# Patient Record
Sex: FEMALE | Race: WHITE | Employment: OTHER | URBAN - METROPOLITAN AREA
[De-identification: names, ages, dates, MRNs, and addresses within clinical notes are randomized per-mention and may not be internally consistent; named-entity substitution may affect disease eponyms.]

---

## 2017-01-03 ENCOUNTER — TELEPHONE (OUTPATIENT)
Dept: INTERNAL MEDICINE CLINIC | Facility: CLINIC | Age: 77
End: 2017-01-03

## 2017-01-03 ENCOUNTER — OFFICE VISIT (OUTPATIENT)
Dept: INTERNAL MEDICINE CLINIC | Facility: CLINIC | Age: 77
End: 2017-01-03

## 2017-01-03 VITALS
DIASTOLIC BLOOD PRESSURE: 74 MMHG | WEIGHT: 143 LBS | RESPIRATION RATE: 16 BRPM | SYSTOLIC BLOOD PRESSURE: 120 MMHG | BODY MASS INDEX: 23 KG/M2 | OXYGEN SATURATION: 93 % | HEART RATE: 84 BPM | TEMPERATURE: 100 F

## 2017-01-03 DIAGNOSIS — R19.7 DIARRHEA, UNSPECIFIED TYPE: Primary | ICD-10-CM

## 2017-01-03 PROCEDURE — 99213 OFFICE O/P EST LOW 20 MIN: CPT | Performed by: INTERNAL MEDICINE

## 2017-01-03 RX ORDER — LOPERAMIDE HYDROCHLORIDE 2 MG/1
2 CAPSULE ORAL 4 TIMES DAILY PRN
Qty: 14 CAPSULE | Refills: 1 | Status: SHIPPED | OUTPATIENT
Start: 2017-01-03 | End: 2017-05-02 | Stop reason: ALTCHOICE

## 2017-01-03 NOTE — PROGRESS NOTES
I did leave a message with her daughter-in-law Michael Salgado gave her an update on the visit. Also informed Michael Salgado that we sent a prescription for Imodium to [facility]. There are staff will make sure that she is signed up to use [facility].   If

## 2017-01-03 NOTE — PROGRESS NOTES
Patient states she has been feeling fine except this morning has had loose bowels. 2.  No blood. Soft mushy stool. No fever chills. Denies a sore throat headaches. She does have memory issues. No polyuria or dysuria. No new medications.   No recent t

## 2017-01-10 ENCOUNTER — OFFICE VISIT (OUTPATIENT)
Dept: INTERNAL MEDICINE CLINIC | Facility: CLINIC | Age: 77
End: 2017-01-10

## 2017-01-10 VITALS
TEMPERATURE: 99 F | SYSTOLIC BLOOD PRESSURE: 138 MMHG | DIASTOLIC BLOOD PRESSURE: 70 MMHG | RESPIRATION RATE: 16 BRPM | OXYGEN SATURATION: 96 % | HEART RATE: 82 BPM

## 2017-01-10 DIAGNOSIS — J06.9 URTI (ACUTE UPPER RESPIRATORY INFECTION): ICD-10-CM

## 2017-01-10 DIAGNOSIS — R41.3 MEMORY DEFICIT: Primary | ICD-10-CM

## 2017-01-10 PROCEDURE — 99213 OFFICE O/P EST LOW 20 MIN: CPT | Performed by: INTERNAL MEDICINE

## 2017-01-10 RX ORDER — ACETAMINOPHEN 500 MG
500 TABLET ORAL EVERY 6 HOURS PRN
COMMUNITY
End: 2020-09-30

## 2017-01-10 NOTE — PROGRESS NOTES
Patient is here with her daughter. She does have some memory deficit. History obtained from patient and daughter. Patient has not felt well since [place]. Early on she did have loose stools.   No stools are back to normal.  She does complain of a head

## 2017-01-13 ENCOUNTER — TELEPHONE (OUTPATIENT)
Dept: INTERNAL MEDICINE CLINIC | Facility: CLINIC | Age: 77
End: 2017-01-13

## 2017-01-13 ENCOUNTER — OFFICE VISIT (OUTPATIENT)
Dept: INTERNAL MEDICINE CLINIC | Facility: CLINIC | Age: 77
End: 2017-01-13

## 2017-01-13 VITALS
TEMPERATURE: 98 F | WEIGHT: 145.5 LBS | OXYGEN SATURATION: 98 % | SYSTOLIC BLOOD PRESSURE: 140 MMHG | DIASTOLIC BLOOD PRESSURE: 84 MMHG | HEART RATE: 72 BPM | RESPIRATION RATE: 16 BRPM | BODY MASS INDEX: 23 KG/M2

## 2017-01-13 DIAGNOSIS — J01.10 ACUTE NON-RECURRENT FRONTAL SINUSITIS: Primary | ICD-10-CM

## 2017-01-13 PROCEDURE — 99213 OFFICE O/P EST LOW 20 MIN: CPT | Performed by: INTERNAL MEDICINE

## 2017-01-13 RX ORDER — AMOXICILLIN 500 MG/1
500 CAPSULE ORAL 3 TIMES DAILY
Qty: 21 CAPSULE | Refills: 0 | Status: SHIPPED | OUTPATIENT
Start: 2017-01-13 | End: 2017-01-13

## 2017-01-13 NOTE — PROGRESS NOTES
Patient comes in complaining of headache frontal lobe and pain in her ears. She is very tired and fatigued with head congestion. Physical examination pleasant individual appears in no acute distress.   Throat clear both tympanic membranes cloudy with a

## 2017-01-13 NOTE — TELEPHONE ENCOUNTER
I left a message with her daughter-in-law RYAN that she has a sinus infection and will put her on antibiotic.

## 2017-01-13 NOTE — PATIENT INSTRUCTIONS
TOP FALL PREVENTION TIPS    INSIDE YOUR HOME   KITCHEN:  ? Use non skid mats only. ? Clean up spills as soon as they happen. ? Keep objects that you use often within easy reach.   BATHROOM:  ? Install grab bars on the bathroom walls beside the tub, sh

## 2017-01-15 RX ORDER — AMOXICILLIN 500 MG/1
500 CAPSULE ORAL 3 TIMES DAILY
Qty: 21 CAPSULE | Refills: 0 | Status: SHIPPED | OUTPATIENT
Start: 2017-01-15 | End: 2017-01-25

## 2017-01-19 ENCOUNTER — OFFICE VISIT (OUTPATIENT)
Dept: INTERNAL MEDICINE CLINIC | Facility: CLINIC | Age: 77
End: 2017-01-19

## 2017-01-19 VITALS
DIASTOLIC BLOOD PRESSURE: 76 MMHG | BODY MASS INDEX: 23 KG/M2 | OXYGEN SATURATION: 95 % | WEIGHT: 144.69 LBS | HEART RATE: 76 BPM | SYSTOLIC BLOOD PRESSURE: 134 MMHG | RESPIRATION RATE: 18 BRPM | TEMPERATURE: 98 F

## 2017-01-19 DIAGNOSIS — R06.00 PND (PAROXYSMAL NOCTURNAL DYSPNEA): Primary | ICD-10-CM

## 2017-01-19 DIAGNOSIS — R41.3 MEMORY DEFICIT: ICD-10-CM

## 2017-01-19 PROCEDURE — 99213 OFFICE O/P EST LOW 20 MIN: CPT | Performed by: INTERNAL MEDICINE

## 2017-01-19 RX ORDER — SACCHAROMYCES BOULARDII 250 MG
250 CAPSULE ORAL DAILY
COMMUNITY
End: 2020-09-30

## 2017-01-19 NOTE — PROGRESS NOTES
Patient is complaining of postnasal drip. We recently saw her for sinus infection. She states she no longer has headache may be \"a mild. No sore throat earache. No cough shortness of breath or wheezing. Physical examination throat ears clear.   Lung

## 2017-01-27 DIAGNOSIS — R73.01 IFG (IMPAIRED FASTING GLUCOSE): Primary | ICD-10-CM

## 2017-01-27 RX ORDER — LISINOPRIL 5 MG/1
5 TABLET ORAL DAILY
Qty: 90 TABLET | Refills: 3 | Status: SHIPPED | OUTPATIENT
Start: 2017-01-27 | End: 2018-03-28

## 2017-04-11 RX ORDER — MEMANTINE HYDROCHLORIDE 10 MG/1
10 TABLET ORAL 2 TIMES DAILY
COMMUNITY
End: 2020-09-30

## 2017-04-22 RX ORDER — LEVOTHYROXINE SODIUM 0.07 MG/1
75 TABLET ORAL
Qty: 90 TABLET | Refills: 0 | Status: SHIPPED | OUTPATIENT
Start: 2017-04-22 | End: 2018-02-19

## 2017-04-22 RX ORDER — SIMVASTATIN 20 MG
20 TABLET ORAL NIGHTLY
Qty: 90 TABLET | Refills: 0 | Status: SHIPPED | OUTPATIENT
Start: 2017-04-22 | End: 2020-09-30

## 2017-05-02 ENCOUNTER — TELEPHONE (OUTPATIENT)
Dept: INTERNAL MEDICINE CLINIC | Facility: CLINIC | Age: 77
End: 2017-05-02

## 2017-05-02 ENCOUNTER — OFFICE VISIT (OUTPATIENT)
Dept: INTERNAL MEDICINE CLINIC | Facility: CLINIC | Age: 77
End: 2017-05-02

## 2017-05-02 VITALS
OXYGEN SATURATION: 97 % | RESPIRATION RATE: 16 BRPM | SYSTOLIC BLOOD PRESSURE: 135 MMHG | HEART RATE: 85 BPM | BODY MASS INDEX: 24 KG/M2 | WEIGHT: 150 LBS | TEMPERATURE: 99 F | DIASTOLIC BLOOD PRESSURE: 82 MMHG

## 2017-05-02 DIAGNOSIS — R41.3 MEMORY DEFICIT: ICD-10-CM

## 2017-05-02 DIAGNOSIS — J01.10 ACUTE NON-RECURRENT FRONTAL SINUSITIS: Primary | ICD-10-CM

## 2017-05-02 PROCEDURE — 99213 OFFICE O/P EST LOW 20 MIN: CPT | Performed by: INTERNAL MEDICINE

## 2017-05-02 RX ORDER — AMOXICILLIN 500 MG/1
500 CAPSULE ORAL 2 TIMES DAILY
Qty: 14 CAPSULE | Refills: 0 | Status: SHIPPED | OUTPATIENT
Start: 2017-05-02 | End: 2017-06-27 | Stop reason: ALTCHOICE

## 2017-05-02 NOTE — PATIENT INSTRUCTIONS
Amber you have a sinus infection. I ordered an antibiotic at [facility]. Her daughter-in-law Gretchen Lora will pick it up. You may take Tylenol PM in the evening. During the daytime I recommend taking plain Tylenol. The Tylenol PM may make you drowsy.   I wou

## 2017-05-02 NOTE — TELEPHONE ENCOUNTER
I called Reese Grover well Endy Liang was in examination room and update her.see today's note for further information

## 2017-05-02 NOTE — PROGRESS NOTES
For last 2 days patient has been complaining of pain in her ears and forehead. She has felt feverish. Been taken Tylenol with improvement in symptoms. No skin rashes. Appetite is been good bowel movements unchanged.     Physical examination pleasant ind

## 2017-05-09 ENCOUNTER — OFFICE VISIT (OUTPATIENT)
Dept: INTERNAL MEDICINE CLINIC | Facility: CLINIC | Age: 77
End: 2017-05-09

## 2017-05-09 VITALS
RESPIRATION RATE: 16 BRPM | DIASTOLIC BLOOD PRESSURE: 76 MMHG | BODY MASS INDEX: 24.03 KG/M2 | OXYGEN SATURATION: 94 % | WEIGHT: 149.5 LBS | HEIGHT: 66.3 IN | HEART RATE: 79 BPM | SYSTOLIC BLOOD PRESSURE: 130 MMHG | TEMPERATURE: 99 F

## 2017-05-09 DIAGNOSIS — E78.5 DYSLIPIDEMIA: ICD-10-CM

## 2017-05-09 DIAGNOSIS — Z91.81 RISK FOR FALLS: ICD-10-CM

## 2017-05-09 DIAGNOSIS — N18.30 CRF (CHRONIC RENAL FAILURE), STAGE 3 (MODERATE) (HCC): ICD-10-CM

## 2017-05-09 DIAGNOSIS — E03.9 ACQUIRED HYPOTHYROIDISM: ICD-10-CM

## 2017-05-09 DIAGNOSIS — Z00.00 ENCOUNTER FOR ANNUAL HEALTH EXAMINATION: ICD-10-CM

## 2017-05-09 DIAGNOSIS — I10 ESSENTIAL HYPERTENSION WITH GOAL BLOOD PRESSURE LESS THAN 140/90: ICD-10-CM

## 2017-05-09 DIAGNOSIS — H35.3132 INTERMEDIATE STAGE NONEXUDATIVE AGE-RELATED MACULAR DEGENERATION OF BOTH EYES: ICD-10-CM

## 2017-05-09 DIAGNOSIS — R41.3 MEMORY DEFICIT: Primary | ICD-10-CM

## 2017-05-09 DIAGNOSIS — R73.01 IFG (IMPAIRED FASTING GLUCOSE): ICD-10-CM

## 2017-05-09 DIAGNOSIS — H25.813 COMBINED FORMS OF AGE-RELATED CATARACT OF BOTH EYES: ICD-10-CM

## 2017-05-09 PROBLEM — N18.9 CRF (CHRONIC RENAL FAILURE): Status: ACTIVE | Noted: 2017-05-09

## 2017-05-09 PROCEDURE — 36415 COLL VENOUS BLD VENIPUNCTURE: CPT | Performed by: INTERNAL MEDICINE

## 2017-05-09 PROCEDURE — 83036 HEMOGLOBIN GLYCOSYLATED A1C: CPT | Performed by: INTERNAL MEDICINE

## 2017-05-09 PROCEDURE — G0439 PPPS, SUBSEQ VISIT: HCPCS | Performed by: INTERNAL MEDICINE

## 2017-05-09 NOTE — PATIENT INSTRUCTIONS
TOP FALL PREVENTION TIPS    INSIDE YOUR HOME   KITCHEN:  ? Use non skid mats only. ? Clean up spills as soon as they happen. ? Keep objects that you use often within easy reach.   BATHROOM:  ? Install grab bars on the bathroom walls beside the tub, sh At Least  Annually for Diabetics HGBA1C (%)   Date Value   11/08/2016 5.8*    No flowsheet data found.     Fasting Blood Sugar (FSB)   Patient must be diagnosed with one of the following:   • Hypertension   • Dyslipidemia   • Obesity (BMI ³30 kg/m2)   • P but covered  Covered but uncomfortable   Glaucoma Screening      Ophthalmology Visit   Covered annually for Diabetics, people with Glaucoma family history,   Americans over age 48   Americans over age 72 No flowsheet data found.  [place] marycruz darby mentally retarded   Persons who live in the same house as a HepB virus carrier   Homosexual men   Illicit injectable drug abusers     Tetanus Toxoid- Only covered with a cut with metal- TD and TDaP Not covered by Medicare Part B) No orders found for this o

## 2017-05-09 NOTE — PROGRESS NOTES
Patient is here with her daughter. She does have memory deficit. She is on Namenda and Aricept. Seems to be functioning well. Problem #2 impaired fasting glucose. Does not check home blood sugar. Up-to-date on eye exam.  Will check A1c.   Problem #3 h for the 5/9/17 encounter (Office Visit) with Heena Salter MD:  amoxicillin 500 MG Oral Cap Take 1 capsule (500 mg total) by mouth 2 (two) times daily.    Levothyroxine Sodium 75 MCG Oral Tab Take 1 tablet (75 mcg total) by mouth before breakfast. complaint of urinary incontinence   MUSCULOSKELETAL: denies back pain  NEURO: denies headaches  PSYCHE: denies depression or anxiety  HEMATOLOGIC: denies hx of anemia  ENDOCRINE: Hx of thyroid history  ALL/ASTHMA:  hx of allergy     EXAM:   /76 mmHg AND OLDER PRSV FREE SINGLE D (52051) FLU CLINIC 10/06/2016       ASSESSMENT AND OTHER RELEVANT CHRONIC CONDITIONS:   Gonzalez Choi is a 78year old female who presents for a Medicare Assessment.      PLAN SUMMARY:   Diagnoses and all orders for this visi without help    Dressing: Able without help    Eating: Able without help    Driving:  (no driving)    Preparing your meals: Able without help    Managing money/bills: Cannot do without help    Taking medications as prescribed: Need some help    Are you abl Screening      HbgA1C   Annually   Lab Results  Component Value Date   A1C 5.8* 11/08/2016    No flowsheet data found.     Fasting Blood Sugar (FSB)Annually   GLUCOSE (mg/dL)   Date Value   04/29/2016 104*   ----------       Cardiovascular Disease Screening factors:   End-stage renal disease   Hemophiliacs who received Factor VIII or IX concentrates   Clients of institutions for the mentally retarded   Persons who live in the same house as a HepB virus carrier   Homosexual men   Illicit injectable drug abuser

## 2017-06-21 ENCOUNTER — HOSPITAL ENCOUNTER (OUTPATIENT)
Dept: CT IMAGING | Facility: HOSPITAL | Age: 77
Discharge: HOME | End: 2017-06-21
Attending: INTERNAL MEDICINE

## 2017-06-21 DIAGNOSIS — Z13.9 SCREENING PROCEDURE: ICD-10-CM

## 2017-06-27 ENCOUNTER — OFFICE VISIT (OUTPATIENT)
Dept: INTERNAL MEDICINE CLINIC | Facility: CLINIC | Age: 77
End: 2017-06-27

## 2017-06-27 VITALS
BODY MASS INDEX: 25 KG/M2 | WEIGHT: 154 LBS | DIASTOLIC BLOOD PRESSURE: 75 MMHG | RESPIRATION RATE: 16 BRPM | HEART RATE: 80 BPM | OXYGEN SATURATION: 95 % | SYSTOLIC BLOOD PRESSURE: 135 MMHG | TEMPERATURE: 99 F

## 2017-06-27 DIAGNOSIS — J01.10 ACUTE NON-RECURRENT FRONTAL SINUSITIS: Primary | ICD-10-CM

## 2017-06-27 PROCEDURE — 99213 OFFICE O/P EST LOW 20 MIN: CPT | Performed by: INTERNAL MEDICINE

## 2017-06-27 RX ORDER — MECLIZINE HCL 12.5 MG/1
12.5 TABLET ORAL 3 TIMES DAILY PRN
Status: DISCONTINUED | OUTPATIENT
Start: 2017-06-27 | End: 2017-11-14 | Stop reason: ALTCHOICE

## 2017-06-27 RX ORDER — AMOXICILLIN 500 MG/1
500 CAPSULE ORAL 3 TIMES DAILY
Qty: 30 CAPSULE | Refills: 0 | Status: SHIPPED | OUTPATIENT
Start: 2017-06-27 | End: 2017-07-07

## 2017-06-27 NOTE — PROGRESS NOTES
Patient's states for about a week she has had a frontal headache felt dizzy. At times  room was spinning around. Denies a sore throat earache no fever chills night sweats or skin rashes. She states in the past she has had a history of sinusitis.   She ha

## 2017-07-06 ENCOUNTER — OFFICE VISIT (OUTPATIENT)
Dept: INTERNAL MEDICINE CLINIC | Facility: CLINIC | Age: 77
End: 2017-07-06

## 2017-07-06 VITALS
RESPIRATION RATE: 16 BRPM | HEART RATE: 84 BPM | SYSTOLIC BLOOD PRESSURE: 138 MMHG | OXYGEN SATURATION: 96 % | BODY MASS INDEX: 25 KG/M2 | TEMPERATURE: 99 F | DIASTOLIC BLOOD PRESSURE: 85 MMHG | WEIGHT: 154.88 LBS

## 2017-07-06 DIAGNOSIS — R42 VERTIGO: Primary | ICD-10-CM

## 2017-07-06 PROCEDURE — 99213 OFFICE O/P EST LOW 20 MIN: CPT | Performed by: INTERNAL MEDICINE

## 2017-07-06 RX ORDER — MECLIZINE HCL 12.5 MG/1
TABLET ORAL
Qty: 21 TABLET | Refills: 0 | Status: SHIPPED | OUTPATIENT
Start: 2017-07-06 | End: 2017-09-07

## 2017-07-06 NOTE — PROGRESS NOTES
She is here with her friend Edmar Powerscarl. Following up on her vertigo. States she feels better but still at times becomes dizzy. She has not completed her amoxicillin. She is done with Antivert. Complains a lot of head congestion.   No fever chills or shortne

## 2017-07-13 ENCOUNTER — OFFICE VISIT (OUTPATIENT)
Dept: INTERNAL MEDICINE CLINIC | Facility: CLINIC | Age: 77
End: 2017-07-13

## 2017-07-13 VITALS
OXYGEN SATURATION: 98 % | RESPIRATION RATE: 18 BRPM | BODY MASS INDEX: 25 KG/M2 | DIASTOLIC BLOOD PRESSURE: 88 MMHG | TEMPERATURE: 99 F | HEART RATE: 77 BPM | SYSTOLIC BLOOD PRESSURE: 132 MMHG | WEIGHT: 155.38 LBS

## 2017-07-13 DIAGNOSIS — R42 VERTIGO: Primary | ICD-10-CM

## 2017-07-13 PROCEDURE — 99213 OFFICE O/P EST LOW 20 MIN: CPT | Performed by: INTERNAL MEDICINE

## 2017-07-13 NOTE — PROGRESS NOTES
Meng Thao is here with her friend Eduardo Ball. He states she is doing better. Did have a brief episode yesterday. Still has postnasal drip. No head pressure. No cough shortness of breath. No sore throat or earache. No fever chills.     Physical examination pl

## 2017-07-28 ENCOUNTER — TELEPHONE (OUTPATIENT)
Dept: INTERNAL MEDICINE CLINIC | Facility: CLINIC | Age: 77
End: 2017-07-28

## 2017-07-28 NOTE — TELEPHONE ENCOUNTER
Called Mrs. Hernandez regarding last colonoscopy procedure. She has not had one and was advised that she will get a referral and we will call her when that is ready for . Please advise whether you want colonoscopy or cologuard to be done.

## 2017-08-10 ENCOUNTER — OFFICE VISIT (OUTPATIENT)
Dept: INTERNAL MEDICINE CLINIC | Facility: CLINIC | Age: 77
End: 2017-08-10

## 2017-08-10 VITALS
HEART RATE: 90 BPM | WEIGHT: 155 LBS | OXYGEN SATURATION: 96 % | DIASTOLIC BLOOD PRESSURE: 85 MMHG | BODY MASS INDEX: 25 KG/M2 | TEMPERATURE: 97 F | SYSTOLIC BLOOD PRESSURE: 135 MMHG | RESPIRATION RATE: 18 BRPM

## 2017-08-10 DIAGNOSIS — E03.9 ACQUIRED HYPOTHYROIDISM: ICD-10-CM

## 2017-08-10 DIAGNOSIS — R73.01 IFG (IMPAIRED FASTING GLUCOSE): Primary | ICD-10-CM

## 2017-08-10 DIAGNOSIS — R41.3 MEMORY DEFICIT: ICD-10-CM

## 2017-08-10 DIAGNOSIS — E78.5 DYSLIPIDEMIA: ICD-10-CM

## 2017-08-10 PROCEDURE — 99214 OFFICE O/P EST MOD 30 MIN: CPT | Performed by: INTERNAL MEDICINE

## 2017-08-10 NOTE — PROGRESS NOTES
Priscilla Harrison is here with her daughter-in-law. She does have memory issues. Daughter manages her medications. Problem #1 impaired fasting glucose. Patient is up-to-date on her eye exam.  Denies any polyuria polydipsia.   No numbness or tingling in hands or fe

## 2017-08-11 ENCOUNTER — NURSE ONLY (OUTPATIENT)
Dept: INTERNAL MEDICINE CLINIC | Facility: CLINIC | Age: 77
End: 2017-08-11

## 2017-08-11 DIAGNOSIS — E03.9 ACQUIRED HYPOTHYROIDISM: ICD-10-CM

## 2017-08-11 DIAGNOSIS — R41.3 MEMORY DEFICIT: ICD-10-CM

## 2017-08-11 DIAGNOSIS — E78.5 DYSLIPIDEMIA: ICD-10-CM

## 2017-08-11 DIAGNOSIS — R73.01 IFG (IMPAIRED FASTING GLUCOSE): ICD-10-CM

## 2017-08-11 LAB
ALBUMIN SERPL-MCNC: 3.9 G/DL (ref 3.5–4.8)
ALP LIVER SERPL-CCNC: 74 U/L (ref 55–142)
ALT SERPL-CCNC: 18 U/L (ref 14–54)
AST SERPL-CCNC: 17 U/L (ref 15–41)
BASOPHILS # BLD AUTO: 0.04 X10(3) UL (ref 0–0.1)
BASOPHILS NFR BLD AUTO: 0.6 %
BILIRUB SERPL-MCNC: 0.4 MG/DL (ref 0.1–2)
BUN BLD-MCNC: 18 MG/DL (ref 8–20)
CALCIUM BLD-MCNC: 9.2 MG/DL (ref 8.3–10.3)
CHLORIDE: 110 MMOL/L (ref 101–111)
CHOLEST SMN-MCNC: 170 MG/DL (ref ?–200)
CO2: 25 MMOL/L (ref 22–32)
CREAT BLD-MCNC: 1.03 MG/DL (ref 0.55–1.02)
EOSINOPHIL # BLD AUTO: 0.43 X10(3) UL (ref 0–0.3)
EOSINOPHIL NFR BLD AUTO: 5.9 %
ERYTHROCYTE [DISTWIDTH] IN BLOOD BY AUTOMATED COUNT: 12.9 % (ref 11.5–16)
EST. AVERAGE GLUCOSE BLD GHB EST-MCNC: 123 MG/DL (ref 68–126)
GLUCOSE BLD-MCNC: 84 MG/DL (ref 70–99)
HBA1C MFR BLD HPLC: 5.9 % (ref ?–5.7)
HCT VFR BLD AUTO: 43.5 % (ref 34–50)
HDLC SERPL-MCNC: 69 MG/DL (ref 45–?)
HDLC SERPL: 2.46 {RATIO} (ref ?–4.44)
HGB BLD-MCNC: 14.2 G/DL (ref 12–16)
IMMATURE GRANULOCYTE COUNT: 0.02 X10(3) UL (ref 0–1)
IMMATURE GRANULOCYTE RATIO %: 0.3 %
LDLC SERPL CALC-MCNC: 79 MG/DL (ref ?–130)
LDLC SERPL-MCNC: 22 MG/DL (ref 5–40)
LYMPHOCYTES # BLD AUTO: 2.27 X10(3) UL (ref 0.9–4)
LYMPHOCYTES NFR BLD AUTO: 31.4 %
M PROTEIN MFR SERPL ELPH: 7.1 G/DL (ref 6.1–8.3)
MCH RBC QN AUTO: 30.5 PG (ref 27–33.2)
MCHC RBC AUTO-ENTMCNC: 32.6 G/DL (ref 31–37)
MCV RBC AUTO: 93.3 FL (ref 81–100)
MONOCYTES # BLD AUTO: 0.69 X10(3) UL (ref 0.1–0.6)
MONOCYTES NFR BLD AUTO: 9.5 %
NEUTROPHIL ABS PRELIM: 3.79 X10 (3) UL (ref 1.3–6.7)
NEUTROPHILS # BLD AUTO: 3.79 X10(3) UL (ref 1.3–6.7)
NEUTROPHILS NFR BLD AUTO: 52.3 %
NONHDLC SERPL-MCNC: 101 MG/DL (ref ?–130)
PLATELET # BLD AUTO: 214 10(3)UL (ref 150–450)
POTASSIUM SERPL-SCNC: 4 MMOL/L (ref 3.6–5.1)
RBC # BLD AUTO: 4.66 X10(6)UL (ref 3.8–5.1)
RED CELL DISTRIBUTION WIDTH-SD: 44.1 FL (ref 35.1–46.3)
SODIUM SERPL-SCNC: 143 MMOL/L (ref 136–144)
TRIGLYCERIDES: 109 MG/DL (ref ?–150)
TSI SER-ACNC: 2.58 MIU/ML (ref 0.35–5.5)
WBC # BLD AUTO: 7.2 X10(3) UL (ref 4–13)

## 2017-08-11 PROCEDURE — 80061 LIPID PANEL: CPT | Performed by: INTERNAL MEDICINE

## 2017-08-11 PROCEDURE — 83036 HEMOGLOBIN GLYCOSYLATED A1C: CPT | Performed by: INTERNAL MEDICINE

## 2017-08-11 PROCEDURE — 36415 COLL VENOUS BLD VENIPUNCTURE: CPT | Performed by: INTERNAL MEDICINE

## 2017-08-11 PROCEDURE — 85025 COMPLETE CBC W/AUTO DIFF WBC: CPT | Performed by: INTERNAL MEDICINE

## 2017-08-11 PROCEDURE — 84443 ASSAY THYROID STIM HORMONE: CPT | Performed by: INTERNAL MEDICINE

## 2017-08-11 PROCEDURE — 80053 COMPREHEN METABOLIC PANEL: CPT | Performed by: INTERNAL MEDICINE

## 2017-09-07 ENCOUNTER — OFFICE VISIT (OUTPATIENT)
Dept: INTERNAL MEDICINE CLINIC | Facility: CLINIC | Age: 77
End: 2017-09-07

## 2017-09-07 VITALS
BODY MASS INDEX: 25 KG/M2 | DIASTOLIC BLOOD PRESSURE: 76 MMHG | RESPIRATION RATE: 18 BRPM | WEIGHT: 156.81 LBS | TEMPERATURE: 99 F | OXYGEN SATURATION: 95 % | HEART RATE: 80 BPM | SYSTOLIC BLOOD PRESSURE: 130 MMHG

## 2017-09-07 DIAGNOSIS — R42 VERTIGO: Primary | ICD-10-CM

## 2017-09-07 DIAGNOSIS — T78.40XA ALLERGIC REACTION, INITIAL ENCOUNTER: ICD-10-CM

## 2017-09-07 PROCEDURE — 99213 OFFICE O/P EST LOW 20 MIN: CPT | Performed by: INTERNAL MEDICINE

## 2017-09-07 RX ORDER — MECLIZINE HCL 12.5 MG/1
TABLET ORAL
Qty: 21 TABLET | Refills: 0 | Status: SHIPPED | OUTPATIENT
Start: 2017-09-07 | End: 2017-11-14 | Stop reason: ALTCHOICE

## 2017-09-07 NOTE — PROGRESS NOTES
Patient woke up with swollen right eye and right hand. No change in vision. No discharge. Also complaining of vertigo. This is been a chronic ongoing problem with her. She is here with her friend Whitney Acosta.     Physical examination [facility] individual wi

## 2017-09-27 ENCOUNTER — IMMUNIZATION (OUTPATIENT)
Dept: INTERNAL MEDICINE CLINIC | Facility: CLINIC | Age: 77
End: 2017-09-27

## 2017-09-27 PROCEDURE — 90653 IIV ADJUVANT VACCINE IM: CPT | Performed by: INTERNAL MEDICINE

## 2017-09-27 PROCEDURE — G0008 ADMIN INFLUENZA VIRUS VAC: HCPCS | Performed by: INTERNAL MEDICINE

## 2017-10-11 RX ORDER — SIMVASTATIN 20 MG
TABLET ORAL
Qty: 90 TABLET | Refills: 0 | Status: SHIPPED | OUTPATIENT
Start: 2017-10-11 | End: 2017-11-14

## 2017-10-11 RX ORDER — LEVOTHYROXINE SODIUM 0.07 MG/1
TABLET ORAL
Qty: 90 TABLET | Refills: 0 | Status: SHIPPED | OUTPATIENT
Start: 2017-10-11 | End: 2017-11-14

## 2017-11-14 ENCOUNTER — OFFICE VISIT (OUTPATIENT)
Dept: INTERNAL MEDICINE CLINIC | Facility: CLINIC | Age: 77
End: 2017-11-14

## 2017-11-14 VITALS
OXYGEN SATURATION: 97 % | RESPIRATION RATE: 16 BRPM | WEIGHT: 160.63 LBS | SYSTOLIC BLOOD PRESSURE: 130 MMHG | HEART RATE: 76 BPM | BODY MASS INDEX: 24.92 KG/M2 | HEIGHT: 67.21 IN | DIASTOLIC BLOOD PRESSURE: 80 MMHG

## 2017-11-14 DIAGNOSIS — R73.01 IFG (IMPAIRED FASTING GLUCOSE): Primary | ICD-10-CM

## 2017-11-14 DIAGNOSIS — R41.3 MEMORY DEFICIT: ICD-10-CM

## 2017-11-14 PROCEDURE — 99214 OFFICE O/P EST MOD 30 MIN: CPT | Performed by: INTERNAL MEDICINE

## 2017-11-14 PROCEDURE — 36415 COLL VENOUS BLD VENIPUNCTURE: CPT | Performed by: INTERNAL MEDICINE

## 2017-11-14 PROCEDURE — 83036 HEMOGLOBIN GLYCOSYLATED A1C: CPT | Performed by: INTERNAL MEDICINE

## 2017-11-14 NOTE — PROGRESS NOTES
Patient is here with her friend Phebe Schwab. We are to check her impaired fasting glucose. She does have some memory issues not quite sure whether she seen her eye doctor. She denies polyuria polydipsia. Appetite is good. Does not check home blood sugar.

## 2018-01-03 RX ORDER — LEVOTHYROXINE SODIUM 0.07 MG/1
TABLET ORAL
Qty: 90 TABLET | Refills: 2 | Status: SHIPPED | OUTPATIENT
Start: 2018-01-03 | End: 2020-09-30

## 2018-01-03 RX ORDER — SIMVASTATIN 20 MG
TABLET ORAL
Qty: 90 TABLET | Refills: 2 | Status: SHIPPED | OUTPATIENT
Start: 2018-01-03 | End: 2018-02-19

## 2018-02-19 ENCOUNTER — OFFICE VISIT (OUTPATIENT)
Dept: INTERNAL MEDICINE CLINIC | Facility: CLINIC | Age: 78
End: 2018-02-19

## 2018-02-19 VITALS
BODY MASS INDEX: 25.34 KG/M2 | OXYGEN SATURATION: 93 % | WEIGHT: 163.38 LBS | SYSTOLIC BLOOD PRESSURE: 118 MMHG | TEMPERATURE: 98 F | DIASTOLIC BLOOD PRESSURE: 78 MMHG | RESPIRATION RATE: 16 BRPM | HEIGHT: 67.21 IN | HEART RATE: 82 BPM

## 2018-02-19 DIAGNOSIS — R73.01 IFG (IMPAIRED FASTING GLUCOSE): ICD-10-CM

## 2018-02-19 DIAGNOSIS — E78.5 DYSLIPIDEMIA: ICD-10-CM

## 2018-02-19 DIAGNOSIS — I10 ESSENTIAL HYPERTENSION WITH GOAL BLOOD PRESSURE LESS THAN 140/90: Primary | ICD-10-CM

## 2018-02-19 LAB
EST. AVERAGE GLUCOSE BLD GHB EST-MCNC: 131 MG/DL (ref 68–126)
HBA1C MFR BLD HPLC: 6.2 % (ref ?–5.7)

## 2018-02-19 PROCEDURE — 83036 HEMOGLOBIN GLYCOSYLATED A1C: CPT | Performed by: INTERNAL MEDICINE

## 2018-02-19 PROCEDURE — 99213 OFFICE O/P EST LOW 20 MIN: CPT | Performed by: INTERNAL MEDICINE

## 2018-02-19 PROCEDURE — 36415 COLL VENOUS BLD VENIPUNCTURE: CPT | Performed by: INTERNAL MEDICINE

## 2018-02-19 RX ORDER — DONEPEZIL HYDROCHLORIDE 10 MG/1
1 TABLET, FILM COATED ORAL DAILY
Refills: 3 | COMMUNITY
Start: 2018-02-14 | End: 2020-09-30

## 2018-02-19 NOTE — PROGRESS NOTES
Davie Orlando is here with her daughter-in-law. She is up-to-date on eye exam.  Denies any polyuria polydipsia. Physical examination pleasant individual in no acute distress. Normocephalic nonicteric. Carotids 1+ no bruits. No thyromegaly or bruit.   Lungs

## 2018-02-20 ENCOUNTER — TELEPHONE (OUTPATIENT)
Dept: INTERNAL MEDICINE CLINIC | Facility: CLINIC | Age: 78
End: 2018-02-20

## 2018-02-20 NOTE — TELEPHONE ENCOUNTER
----- Message from Katia Rhoades MD sent at 2/20/2018  8:49 AM CST -----  Nancy Matson I left a message on your answering machine. Your A1c did jump up to 6.2. We are going to increase the metformin to 1 tablet twice a day.   I did write a prescription fo

## 2018-03-28 ENCOUNTER — OFFICE VISIT (OUTPATIENT)
Dept: INTERNAL MEDICINE CLINIC | Facility: CLINIC | Age: 78
End: 2018-03-28

## 2018-03-28 VITALS
HEART RATE: 76 BPM | OXYGEN SATURATION: 96 % | WEIGHT: 161.38 LBS | SYSTOLIC BLOOD PRESSURE: 125 MMHG | TEMPERATURE: 98 F | RESPIRATION RATE: 18 BRPM | DIASTOLIC BLOOD PRESSURE: 80 MMHG | BODY MASS INDEX: 25 KG/M2

## 2018-03-28 DIAGNOSIS — B34.9 VIRAL SYNDROME: ICD-10-CM

## 2018-03-28 DIAGNOSIS — R19.7 DIARRHEA, UNSPECIFIED TYPE: Primary | ICD-10-CM

## 2018-03-28 PROCEDURE — 99213 OFFICE O/P EST LOW 20 MIN: CPT | Performed by: INTERNAL MEDICINE

## 2018-03-28 NOTE — PATIENT INSTRUCTIONS
Treating Diarrhea    Diarrhea happens when you have loose, watery, or frequent bowel movements. It is a common problem with many causes. Most cases of diarrhea clear up on their own. But certain cases may need treatment.  Be sure to see your healthcare pr © 6207-3488 [address]. [address] [facility], [address] [place]. All rights reserved. This information is not intended as a substitute for professional medical care. Always follow your healthcare professional's instructions.         Viral S · Your appetite may be poor, so a light diet is fine. Avoid dehydration by drinking 8 to 12 8-ounce glasses of fluids each day.  This may include water; orange juice; lemonade; apple, grape, and cranberry juice; clear fruit drinks; electrolyte replacement a © 4445-3017 [address]. [address] [facility], [address] [place]. All rights reserved. This information is not intended as a substitute for professional medical care. Always follow your healthcare professional's instructions.         Viral S © 8996-8131 [address]. [address] [facility], [facility]. All rights reserved. This information is not intended as a substitute for professional medical care. Always follow your healthcare professional's instructions.

## 2018-03-29 RX ORDER — LISINOPRIL 5 MG/1
TABLET ORAL
Qty: 90 TABLET | Refills: 0 | Status: SHIPPED | OUTPATIENT
Start: 2018-03-29 | End: 2018-06-20

## 2018-03-30 ENCOUNTER — OFFICE VISIT (OUTPATIENT)
Dept: INTERNAL MEDICINE CLINIC | Facility: CLINIC | Age: 78
End: 2018-03-30

## 2018-03-30 VITALS
SYSTOLIC BLOOD PRESSURE: 138 MMHG | HEART RATE: 82 BPM | RESPIRATION RATE: 18 BRPM | BODY MASS INDEX: 26 KG/M2 | DIASTOLIC BLOOD PRESSURE: 84 MMHG | WEIGHT: 165 LBS | OXYGEN SATURATION: 96 % | TEMPERATURE: 98 F

## 2018-03-30 DIAGNOSIS — R19.7 DIARRHEA, UNSPECIFIED TYPE: Primary | ICD-10-CM

## 2018-03-30 DIAGNOSIS — R41.3 MEMORY DEFICIT: ICD-10-CM

## 2018-03-30 PROCEDURE — 99213 OFFICE O/P EST LOW 20 MIN: CPT | Performed by: INTERNAL MEDICINE

## 2018-03-30 NOTE — PROGRESS NOTES
Michi Avila is a patient with memory disturbance. She is here with her friend Po Buchanan. Disorder recently for diarrhea. Patient states she is feeling much better. On examination she is pleasant alert no acute distress.   Normocephalic nonicteric abdomen soft

## 2018-04-26 ENCOUNTER — OFFICE VISIT (OUTPATIENT)
Dept: INTERNAL MEDICINE CLINIC | Facility: CLINIC | Age: 78
End: 2018-04-26

## 2018-04-26 VITALS
WEIGHT: 162 LBS | HEART RATE: 80 BPM | DIASTOLIC BLOOD PRESSURE: 90 MMHG | TEMPERATURE: 99 F | RESPIRATION RATE: 18 BRPM | BODY MASS INDEX: 25 KG/M2 | SYSTOLIC BLOOD PRESSURE: 150 MMHG | OXYGEN SATURATION: 96 %

## 2018-04-26 DIAGNOSIS — J01.11 ACUTE RECURRENT FRONTAL SINUSITIS: Primary | ICD-10-CM

## 2018-04-26 DIAGNOSIS — I10 ELEVATED BLOOD PRESSURE READING IN OFFICE WITH DIAGNOSIS OF HYPERTENSION: ICD-10-CM

## 2018-04-26 PROCEDURE — 99213 OFFICE O/P EST LOW 20 MIN: CPT | Performed by: INTERNAL MEDICINE

## 2018-04-26 RX ORDER — LEVOFLOXACIN 250 MG/1
250 TABLET ORAL DAILY
Qty: 7 TABLET | Refills: 0 | Status: SHIPPED | OUTPATIENT
Start: 2018-04-26 | End: 2018-10-16 | Stop reason: ALTCHOICE

## 2018-04-26 NOTE — PROGRESS NOTES
Von Fragoso is here with her significant other Yani Maurice. For last week she is feeling tired fatigue pressure on the frontal sinus. Lack of appetite. Sleeps a lot. Has a history according to area frequent sinus infections. No sore throat no cough.     Physical

## 2018-04-30 ENCOUNTER — OFFICE VISIT (OUTPATIENT)
Dept: INTERNAL MEDICINE CLINIC | Facility: CLINIC | Age: 78
End: 2018-04-30

## 2018-04-30 VITALS
TEMPERATURE: 99 F | HEIGHT: 67.21 IN | BODY MASS INDEX: 25.64 KG/M2 | HEART RATE: 82 BPM | OXYGEN SATURATION: 95 % | WEIGHT: 165.31 LBS | DIASTOLIC BLOOD PRESSURE: 82 MMHG | SYSTOLIC BLOOD PRESSURE: 122 MMHG | RESPIRATION RATE: 16 BRPM

## 2018-04-30 DIAGNOSIS — J01.11 ACUTE RECURRENT FRONTAL SINUSITIS: Primary | ICD-10-CM

## 2018-04-30 DIAGNOSIS — I10 ESSENTIAL HYPERTENSION: ICD-10-CM

## 2018-04-30 PROCEDURE — 99213 OFFICE O/P EST LOW 20 MIN: CPT | Performed by: INTERNAL MEDICINE

## 2018-05-18 ENCOUNTER — OFFICE VISIT (OUTPATIENT)
Dept: INTERNAL MEDICINE CLINIC | Facility: CLINIC | Age: 78
End: 2018-05-18

## 2018-05-18 VITALS
DIASTOLIC BLOOD PRESSURE: 78 MMHG | WEIGHT: 163.38 LBS | RESPIRATION RATE: 17 BRPM | BODY MASS INDEX: 25.95 KG/M2 | HEART RATE: 76 BPM | OXYGEN SATURATION: 94 % | TEMPERATURE: 97 F | HEIGHT: 66.47 IN | SYSTOLIC BLOOD PRESSURE: 122 MMHG

## 2018-05-18 DIAGNOSIS — E03.9 ACQUIRED HYPOTHYROIDISM: ICD-10-CM

## 2018-05-18 DIAGNOSIS — R73.01 IFG (IMPAIRED FASTING GLUCOSE): ICD-10-CM

## 2018-05-18 DIAGNOSIS — E78.5 DYSLIPIDEMIA: ICD-10-CM

## 2018-05-18 DIAGNOSIS — R41.3 MEMORY DEFICIT: ICD-10-CM

## 2018-05-18 DIAGNOSIS — N18.30 CHRONIC RENAL FAILURE, STAGE 3 (MODERATE) (HCC): ICD-10-CM

## 2018-05-18 DIAGNOSIS — I10 ESSENTIAL HYPERTENSION WITH GOAL BLOOD PRESSURE LESS THAN 140/90: Primary | ICD-10-CM

## 2018-05-18 DIAGNOSIS — H35.3132 INTERMEDIATE STAGE NONEXUDATIVE AGE-RELATED MACULAR DEGENERATION OF BOTH EYES: ICD-10-CM

## 2018-05-18 DIAGNOSIS — H25.813 COMBINED FORMS OF AGE-RELATED CATARACT OF BOTH EYES: ICD-10-CM

## 2018-05-18 DIAGNOSIS — Z00.00 ENCOUNTER FOR ANNUAL HEALTH EXAMINATION: ICD-10-CM

## 2018-05-18 PROBLEM — J01.11 ACUTE RECURRENT FRONTAL SINUSITIS: Status: RESOLVED | Noted: 2018-04-30 | Resolved: 2018-05-18

## 2018-05-18 PROCEDURE — G0439 PPPS, SUBSEQ VISIT: HCPCS | Performed by: INTERNAL MEDICINE

## 2018-05-18 PROCEDURE — 83036 HEMOGLOBIN GLYCOSYLATED A1C: CPT | Performed by: INTERNAL MEDICINE

## 2018-05-18 RX ORDER — CETIRIZINE HYDROCHLORIDE 10 MG/1
10 TABLET ORAL AS NEEDED
COMMUNITY
End: 2020-09-30

## 2018-05-18 NOTE — PATIENT INSTRUCTIONS
Aretha Hernandez's SCREENING SCHEDULE   Tests on this list are recommended by your physician but may not be covered, or covered at this frequency, by your insurer. Please check with your insurance carrier before scheduling to verify coverage.    NICOLA every 10 years- more often if abnormal Colonoscopy,10 Years due on 08/01/2024 Update Health Maintenance if applicable    Flex Sigmoidoscopy Screen  Covered every 5 years No results found for this or any previous visit. No flowsheet data found.      Fecal Oc Please get once after your 65th birthday    Pneumococcal 23 (Pneumovax)  Covered Once after 65 No orders found for this or any previous visit.  Please get once after your 65th birthday    Hepatitis B for Moderate/High Risk       No orders found for this or

## 2018-05-18 NOTE — PROGRESS NOTES
HPI:   Olvin Prieto is a 78year old female who presents for a Medicare Subsequent Annual Wellness visit (Pt already had Initial Annual Wellness).     No new C/O  Her last annual assessment has been over 1 year: Annual Physical due on 05/09/2018 Depression Screening (PHQ-2/PHQ-9): Over the LAST 2 WEEKS   Little interest or pleasure in doing things (over the last two weeks)?: Not at all  Feeling down, depressed, or hopeless (over the last two weeks)?: Not at all  PHQ-2 SCORE: 0     Advanced D ALLERGIES:   She has No Known Allergies.     CURRENT MEDICATIONS:     Outpatient Prescriptions Marked as Taking for the 5/18/18 encounter (Office Visit) with Marybel Connelly MD:  cetirizine 10 MG Oral Tab Take 10 mg by mouth as needed for Allergies vision or double vision  HEENT: denies nasal congestion, sinus pain or ST  LUNGS: denies shortness of breath with exertion  CARDIOVASCULAR: denies chest pain on exertion  GI: denies abdominal pain, denies heartburn  : denies dysuria, vaginal discharge or Pulses: 2+ and symmetric   Skin: Skin color, texture, turgor normal, no rashes or lesions   Lymph nodes: Cervical, supraclavicular, and axillary nodes normal   Neurologic: Normal       Vaccination History     Immunization History   Administered Date(s) A Glucose (mg/dL)   Date Value   08/11/2017 84   ----------       Cardiovascular Disease Screening     LDL Annually LDL Cholesterol (mg/dL)   Date Value   08/11/2017 79        EKG - w/ Initial Preventative Physical Exam only, or if medically necessary Elec the mentally retarded   Persons who live in the same house as a HepB virus carrier   Homosexual men   Illicit injectable drug abusers     Tetanus Toxoid  Only covered with a cut with metal- TD and TDaP Not covered by Medicare Part B No vaccine history foun

## 2018-06-20 DIAGNOSIS — I10 ESSENTIAL HYPERTENSION WITH GOAL BLOOD PRESSURE LESS THAN 140/90: Primary | ICD-10-CM

## 2018-06-20 RX ORDER — LISINOPRIL 5 MG/1
TABLET ORAL
Qty: 90 TABLET | Refills: 0 | Status: SHIPPED | OUTPATIENT
Start: 2018-06-20 | End: 2018-09-13

## 2018-06-20 NOTE — TELEPHONE ENCOUNTER
Left a message on son's voicmail informing him that Von Fragoso needs an appointment for fasting labs. Dr Rip Choi refilled Lisinopril this morning. Would he like to schedule this appointment or should we contact Von Fragoso directly?

## 2018-06-21 ENCOUNTER — NURSE ONLY (OUTPATIENT)
Dept: INTERNAL MEDICINE CLINIC | Facility: CLINIC | Age: 78
End: 2018-06-21

## 2018-06-21 DIAGNOSIS — I10 ESSENTIAL HYPERTENSION WITH GOAL BLOOD PRESSURE LESS THAN 140/90: ICD-10-CM

## 2018-06-21 PROCEDURE — 36415 COLL VENOUS BLD VENIPUNCTURE: CPT | Performed by: INTERNAL MEDICINE

## 2018-06-21 PROCEDURE — 80053 COMPREHEN METABOLIC PANEL: CPT | Performed by: INTERNAL MEDICINE

## 2018-06-21 PROCEDURE — 85025 COMPLETE CBC W/AUTO DIFF WBC: CPT | Performed by: INTERNAL MEDICINE

## 2018-06-25 DIAGNOSIS — E78.5 DYSLIPIDEMIA: Primary | ICD-10-CM

## 2018-06-25 DIAGNOSIS — E03.9 ACQUIRED HYPOTHYROIDISM: ICD-10-CM

## 2018-06-25 RX ORDER — SIMVASTATIN 20 MG
TABLET ORAL
Qty: 90 TABLET | Refills: 0 | Status: SHIPPED | OUTPATIENT
Start: 2018-06-25 | End: 2018-10-16

## 2018-06-25 RX ORDER — LEVOTHYROXINE SODIUM 0.07 MG/1
TABLET ORAL
Qty: 90 TABLET | Refills: 0 | Status: SHIPPED | OUTPATIENT
Start: 2018-06-25 | End: 2018-08-22

## 2018-08-22 ENCOUNTER — OFFICE VISIT (OUTPATIENT)
Dept: INTERNAL MEDICINE CLINIC | Facility: CLINIC | Age: 78
End: 2018-08-22
Payer: MEDICARE

## 2018-08-22 VITALS
HEART RATE: 77 BPM | OXYGEN SATURATION: 96 % | DIASTOLIC BLOOD PRESSURE: 85 MMHG | WEIGHT: 165.5 LBS | SYSTOLIC BLOOD PRESSURE: 130 MMHG | BODY MASS INDEX: 26 KG/M2 | TEMPERATURE: 97 F | RESPIRATION RATE: 18 BRPM

## 2018-08-22 DIAGNOSIS — E03.9 ACQUIRED HYPOTHYROIDISM: ICD-10-CM

## 2018-08-22 DIAGNOSIS — R73.01 IFG (IMPAIRED FASTING GLUCOSE): Primary | ICD-10-CM

## 2018-08-22 DIAGNOSIS — R41.3 MEMORY DEFICIT: ICD-10-CM

## 2018-08-22 DIAGNOSIS — I10 ESSENTIAL HYPERTENSION WITH GOAL BLOOD PRESSURE LESS THAN 140/90: ICD-10-CM

## 2018-08-22 DIAGNOSIS — E78.5 DYSLIPIDEMIA: ICD-10-CM

## 2018-08-22 LAB
ALBUMIN SERPL-MCNC: 3.9 G/DL (ref 3.5–4.8)
ALBUMIN/GLOB SERPL: 1.1 {RATIO} (ref 1–2)
ALP LIVER SERPL-CCNC: 86 U/L (ref 55–142)
ALT SERPL-CCNC: 25 U/L (ref 14–54)
ANION GAP SERPL CALC-SCNC: 6 MMOL/L (ref 0–18)
AST SERPL-CCNC: 20 U/L (ref 15–41)
BILIRUB SERPL-MCNC: 0.3 MG/DL (ref 0.1–2)
BUN BLD-MCNC: 15 MG/DL (ref 8–20)
BUN/CREAT SERPL: 15 (ref 10–20)
CALCIUM BLD-MCNC: 9.7 MG/DL (ref 8.3–10.3)
CHLORIDE SERPL-SCNC: 108 MMOL/L (ref 101–111)
CHOLEST SMN-MCNC: 178 MG/DL (ref ?–200)
CO2 SERPL-SCNC: 28 MMOL/L (ref 22–32)
CREAT BLD-MCNC: 1 MG/DL (ref 0.55–1.02)
EST. AVERAGE GLUCOSE BLD GHB EST-MCNC: 123 MG/DL (ref 68–126)
GLOBULIN PLAS-MCNC: 3.6 G/DL (ref 2.5–4)
GLUCOSE BLD-MCNC: 97 MG/DL (ref 70–99)
HBA1C MFR BLD HPLC: 5.9 % (ref ?–5.7)
HDLC SERPL-MCNC: 52 MG/DL (ref 40–59)
LDLC SERPL CALC-MCNC: 93 MG/DL (ref ?–100)
M PROTEIN MFR SERPL ELPH: 7.5 G/DL (ref 6.1–8.3)
NONHDLC SERPL-MCNC: 126 MG/DL (ref ?–130)
OSMOLALITY SERPL CALC.SUM OF ELEC: 295 MOSM/KG (ref 275–295)
POTASSIUM SERPL-SCNC: 3.9 MMOL/L (ref 3.6–5.1)
SODIUM SERPL-SCNC: 142 MMOL/L (ref 136–144)
T4 FREE SERPL-MCNC: 1.3 NG/DL (ref 0.9–1.8)
TRIGL SERPL-MCNC: 165 MG/DL (ref 30–149)
TSI SER-ACNC: 0.56 MIU/ML (ref 0.35–5.5)
VLDLC SERPL CALC-MCNC: 33 MG/DL (ref 0–30)

## 2018-08-22 PROCEDURE — 99213 OFFICE O/P EST LOW 20 MIN: CPT | Performed by: INTERNAL MEDICINE

## 2018-08-22 PROCEDURE — 84443 ASSAY THYROID STIM HORMONE: CPT | Performed by: INTERNAL MEDICINE

## 2018-08-22 PROCEDURE — 83036 HEMOGLOBIN GLYCOSYLATED A1C: CPT | Performed by: INTERNAL MEDICINE

## 2018-08-22 PROCEDURE — 80053 COMPREHEN METABOLIC PANEL: CPT | Performed by: INTERNAL MEDICINE

## 2018-08-22 PROCEDURE — 84439 ASSAY OF FREE THYROXINE: CPT | Performed by: INTERNAL MEDICINE

## 2018-08-22 PROCEDURE — 80061 LIPID PANEL: CPT | Performed by: INTERNAL MEDICINE

## 2018-08-22 NOTE — PROGRESS NOTES
Patient is here with her daughter-in-law. She does have memory issues. Normal states are up-to-date on eye exam.  No history of any diabetic complications with GI. Patient does complain of polyuria no polydipsia polyphagia.     Physical examination very

## 2018-09-13 DIAGNOSIS — I10 ESSENTIAL HYPERTENSION WITH GOAL BLOOD PRESSURE LESS THAN 140/90: ICD-10-CM

## 2018-09-13 RX ORDER — LISINOPRIL 5 MG/1
TABLET ORAL
Qty: 90 TABLET | Refills: 3 | Status: SHIPPED | OUTPATIENT
Start: 2018-09-13 | End: 2019-09-06

## 2018-10-10 ENCOUNTER — IMMUNIZATION (OUTPATIENT)
Dept: INTERNAL MEDICINE CLINIC | Facility: CLINIC | Age: 78
End: 2018-10-10
Payer: MEDICARE

## 2018-10-10 ENCOUNTER — TELEPHONE (OUTPATIENT)
Dept: INTERNAL MEDICINE CLINIC | Facility: CLINIC | Age: 78
End: 2018-10-10

## 2018-10-10 PROCEDURE — G0008 ADMIN INFLUENZA VIRUS VAC: HCPCS | Performed by: INTERNAL MEDICINE

## 2018-10-10 PROCEDURE — 90653 IIV ADJUVANT VACCINE IM: CPT | Performed by: INTERNAL MEDICINE

## 2018-10-10 NOTE — TELEPHONE ENCOUNTER
Patient was in today and received flu vaccine. Her friend, Ani Alvarenga, and her were asking about the pneumonia vaccine.     I do not see any recorded in her history so we need to check with son to see if she has had any pneumonia vaccines in the past.  She may

## 2018-10-16 ENCOUNTER — OFFICE VISIT (OUTPATIENT)
Dept: INTERNAL MEDICINE CLINIC | Facility: CLINIC | Age: 78
End: 2018-10-16
Payer: MEDICARE

## 2018-10-16 VITALS
OXYGEN SATURATION: 98 % | WEIGHT: 166.31 LBS | SYSTOLIC BLOOD PRESSURE: 124 MMHG | HEART RATE: 72 BPM | RESPIRATION RATE: 16 BRPM | BODY MASS INDEX: 26 KG/M2 | DIASTOLIC BLOOD PRESSURE: 80 MMHG | TEMPERATURE: 99 F

## 2018-10-16 DIAGNOSIS — H61.23 BILATERAL IMPACTED CERUMEN: ICD-10-CM

## 2018-10-16 DIAGNOSIS — J01.11 ACUTE RECURRENT FRONTAL SINUSITIS: Primary | ICD-10-CM

## 2018-10-16 PROCEDURE — 99213 OFFICE O/P EST LOW 20 MIN: CPT | Performed by: INTERNAL MEDICINE

## 2018-10-16 RX ORDER — DOXYCYCLINE HYCLATE 100 MG
100 TABLET ORAL 2 TIMES DAILY
Qty: 14 TABLET | Refills: 0 | Status: SHIPPED | OUTPATIENT
Start: 2018-10-16 | End: 2019-10-30 | Stop reason: ALTCHOICE

## 2018-10-16 RX ORDER — DOXYCYCLINE HYCLATE 100 MG/1
100 CAPSULE ORAL 2 TIMES DAILY
Qty: 14 CAPSULE | Refills: 0 | Status: SHIPPED | OUTPATIENT
Start: 2018-10-16 | End: 2018-10-16

## 2018-10-16 NOTE — PROGRESS NOTES
David Anaya is here with her friend Nina Bryan. She has been complaining of frontal headache feeling tired fatigue. No recent infection. Also states her ears are clogged.     Physical examination pleasant individual appears in no acute distress throat clear no cerv

## 2018-10-18 ENCOUNTER — OFFICE VISIT (OUTPATIENT)
Dept: INTERNAL MEDICINE CLINIC | Facility: CLINIC | Age: 78
End: 2018-10-18
Payer: MEDICARE

## 2018-10-18 VITALS
DIASTOLIC BLOOD PRESSURE: 78 MMHG | SYSTOLIC BLOOD PRESSURE: 118 MMHG | HEART RATE: 85 BPM | RESPIRATION RATE: 16 BRPM | OXYGEN SATURATION: 95 % | WEIGHT: 166.31 LBS | BODY MASS INDEX: 26 KG/M2 | TEMPERATURE: 99 F

## 2018-10-18 DIAGNOSIS — H61.22 CERUMEN DEBRIS ON TYMPANIC MEMBRANE OF LEFT EAR: ICD-10-CM

## 2018-10-18 DIAGNOSIS — J32.2 SINUSITIS CHRONIC, ETHMOIDAL: Primary | ICD-10-CM

## 2018-10-18 PROCEDURE — 99213 OFFICE O/P EST LOW 20 MIN: CPT | Performed by: INTERNAL MEDICINE

## 2018-10-18 NOTE — PROGRESS NOTES
Allie Robles is here with her friend Zaria Chandra. States she still feels occasionally dizzy with rapid head movements. No fever chills no feeling of fatigue. States her ears are clogged. Physical examination pleasant individual no acute distress.   Rapid head m

## 2018-10-25 NOTE — TELEPHONE ENCOUNTER
Son Shanna Laguerre called the office returning Carolynn's phone call regarding pneumonia injection for his mother. Ida Cueto states that there are no records prior to coming to [facility] showing any pneumonia shot given to the patient.  It was explained to the patient

## 2018-11-07 NOTE — PROGRESS NOTES
Physician at bedside.     When is here with Pansy Cardinal. Feeling much better no longer having any feeling dizzy. No shortness of breath cough.     Physical examination alert well orientated no acute distress normocephalic nonicteric no frontal tenderness lungs clear perivascular system

## 2018-11-08 DIAGNOSIS — R73.01 IFG (IMPAIRED FASTING GLUCOSE): ICD-10-CM

## 2018-12-06 ENCOUNTER — OFFICE VISIT (OUTPATIENT)
Dept: INTERNAL MEDICINE CLINIC | Facility: CLINIC | Age: 78
End: 2018-12-06
Payer: MEDICARE

## 2018-12-06 VITALS
BODY MASS INDEX: 27 KG/M2 | SYSTOLIC BLOOD PRESSURE: 130 MMHG | DIASTOLIC BLOOD PRESSURE: 80 MMHG | HEART RATE: 75 BPM | TEMPERATURE: 99 F | RESPIRATION RATE: 17 BRPM | OXYGEN SATURATION: 98 % | WEIGHT: 168 LBS

## 2018-12-06 DIAGNOSIS — E03.9 ACQUIRED HYPOTHYROIDISM: ICD-10-CM

## 2018-12-06 DIAGNOSIS — R41.3 MEMORY DEFICIT: ICD-10-CM

## 2018-12-06 DIAGNOSIS — I10 ESSENTIAL HYPERTENSION WITH GOAL BLOOD PRESSURE LESS THAN 140/90: ICD-10-CM

## 2018-12-06 DIAGNOSIS — R73.01 IFG (IMPAIRED FASTING GLUCOSE): Primary | ICD-10-CM

## 2018-12-06 PROCEDURE — 83036 HEMOGLOBIN GLYCOSYLATED A1C: CPT | Performed by: INTERNAL MEDICINE

## 2018-12-06 PROCEDURE — 36415 COLL VENOUS BLD VENIPUNCTURE: CPT | Performed by: INTERNAL MEDICINE

## 2018-12-06 PROCEDURE — 99214 OFFICE O/P EST MOD 30 MIN: CPT | Performed by: INTERNAL MEDICINE

## 2018-12-06 NOTE — PROGRESS NOTES
Problem #1 hypertension she denies headaches dizziness chest pain or shortness of breath. Problem 2 hypothyroidism she denies any unexplained weight loss weight gain cold or heat intolerance.   Problem #3 impaired fasting glucose up-to-date on eye exam den

## 2019-02-01 DIAGNOSIS — R73.01 IFG (IMPAIRED FASTING GLUCOSE): ICD-10-CM

## 2019-02-02 RX ORDER — SIMVASTATIN 20 MG
TABLET ORAL
Qty: 90 TABLET | Refills: 0 | Status: SHIPPED | OUTPATIENT
Start: 2019-02-02 | End: 2019-03-21

## 2019-02-02 RX ORDER — LEVOTHYROXINE SODIUM 0.07 MG/1
TABLET ORAL
Qty: 90 TABLET | Refills: 0 | Status: SHIPPED | OUTPATIENT
Start: 2019-02-02 | End: 2019-03-21

## 2019-03-21 ENCOUNTER — OFFICE VISIT (OUTPATIENT)
Dept: INTERNAL MEDICINE CLINIC | Facility: CLINIC | Age: 79
End: 2019-03-21
Payer: MEDICARE

## 2019-03-21 VITALS
SYSTOLIC BLOOD PRESSURE: 122 MMHG | OXYGEN SATURATION: 94 % | DIASTOLIC BLOOD PRESSURE: 80 MMHG | HEART RATE: 92 BPM | RESPIRATION RATE: 18 BRPM | BODY MASS INDEX: 26.84 KG/M2 | HEIGHT: 66.8 IN | TEMPERATURE: 100 F | WEIGHT: 171 LBS

## 2019-03-21 DIAGNOSIS — E78.5 DYSLIPIDEMIA: ICD-10-CM

## 2019-03-21 DIAGNOSIS — R73.01 IFG (IMPAIRED FASTING GLUCOSE): Primary | ICD-10-CM

## 2019-03-21 DIAGNOSIS — I10 ESSENTIAL HYPERTENSION WITH GOAL BLOOD PRESSURE LESS THAN 140/90: ICD-10-CM

## 2019-03-21 DIAGNOSIS — E03.9 ACQUIRED HYPOTHYROIDISM: ICD-10-CM

## 2019-03-21 DIAGNOSIS — R41.3 MEMORY DEFICIT: ICD-10-CM

## 2019-03-21 LAB
EST. AVERAGE GLUCOSE BLD GHB EST-MCNC: 128 MG/DL (ref 68–126)
HBA1C MFR BLD HPLC: 6.1 % (ref ?–5.7)

## 2019-03-21 PROCEDURE — 36415 COLL VENOUS BLD VENIPUNCTURE: CPT | Performed by: INTERNAL MEDICINE

## 2019-03-21 PROCEDURE — 83036 HEMOGLOBIN GLYCOSYLATED A1C: CPT | Performed by: INTERNAL MEDICINE

## 2019-03-21 PROCEDURE — 99214 OFFICE O/P EST MOD 30 MIN: CPT | Performed by: INTERNAL MEDICINE

## 2019-03-21 NOTE — PROGRESS NOTES
Jose Antonio Pretty is here with her friend Sneha Chetan. Reason for visit impaired fasting glucose. Kalpesh Arellano tells me she is up-to-date on her eye exam.  No there is no polyuria polydipsia or polyphagia. Does not check home blood sugar.   Does have history of hypertension b

## 2019-04-16 ENCOUNTER — TELEPHONE (OUTPATIENT)
Dept: INTERNAL MEDICINE CLINIC | Facility: CLINIC | Age: 79
End: 2019-04-16

## 2019-04-28 DIAGNOSIS — R73.01 IFG (IMPAIRED FASTING GLUCOSE): ICD-10-CM

## 2019-04-28 RX ORDER — LEVOTHYROXINE SODIUM 0.07 MG/1
TABLET ORAL
Qty: 90 TABLET | Refills: 0 | Status: SHIPPED | OUTPATIENT
Start: 2019-04-28 | End: 2019-07-18

## 2019-04-28 RX ORDER — SIMVASTATIN 20 MG
TABLET ORAL
Qty: 90 TABLET | Refills: 0 | Status: SHIPPED | OUTPATIENT
Start: 2019-04-28 | End: 2019-10-30

## 2019-07-18 ENCOUNTER — OFFICE VISIT (OUTPATIENT)
Dept: INTERNAL MEDICINE CLINIC | Facility: CLINIC | Age: 79
End: 2019-07-18
Payer: MEDICARE

## 2019-07-18 VITALS
DIASTOLIC BLOOD PRESSURE: 80 MMHG | WEIGHT: 175.81 LBS | HEART RATE: 84 BPM | OXYGEN SATURATION: 98 % | BODY MASS INDEX: 27.27 KG/M2 | SYSTOLIC BLOOD PRESSURE: 136 MMHG | RESPIRATION RATE: 17 BRPM | TEMPERATURE: 99 F | HEIGHT: 67.37 IN

## 2019-07-18 DIAGNOSIS — R73.01 IFG (IMPAIRED FASTING GLUCOSE): ICD-10-CM

## 2019-07-18 DIAGNOSIS — Z00.00 ENCOUNTER FOR ANNUAL HEALTH EXAMINATION: ICD-10-CM

## 2019-07-18 DIAGNOSIS — E78.5 DYSLIPIDEMIA: ICD-10-CM

## 2019-07-18 DIAGNOSIS — E03.9 ACQUIRED HYPOTHYROIDISM: ICD-10-CM

## 2019-07-18 DIAGNOSIS — H35.3132 INTERMEDIATE STAGE NONEXUDATIVE AGE-RELATED MACULAR DEGENERATION OF BOTH EYES: ICD-10-CM

## 2019-07-18 DIAGNOSIS — H25.813 COMBINED FORMS OF AGE-RELATED CATARACT OF BOTH EYES: ICD-10-CM

## 2019-07-18 DIAGNOSIS — I10 ESSENTIAL HYPERTENSION WITH GOAL BLOOD PRESSURE LESS THAN 140/90: Primary | ICD-10-CM

## 2019-07-18 DIAGNOSIS — R41.3 MEMORY DEFICIT: ICD-10-CM

## 2019-07-18 PROCEDURE — G0009 ADMIN PNEUMOCOCCAL VACCINE: HCPCS | Performed by: INTERNAL MEDICINE

## 2019-07-18 PROCEDURE — 83036 HEMOGLOBIN GLYCOSYLATED A1C: CPT | Performed by: INTERNAL MEDICINE

## 2019-07-18 PROCEDURE — 90732 PPSV23 VACC 2 YRS+ SUBQ/IM: CPT | Performed by: INTERNAL MEDICINE

## 2019-07-18 PROCEDURE — G0439 PPPS, SUBSEQ VISIT: HCPCS | Performed by: INTERNAL MEDICINE

## 2019-07-18 NOTE — PROGRESS NOTES
HPI:   Joel Humphrey is a 70year old female who presents for a Medicare Subsequent Annual Wellness visit (Pt already had Initial Annual Wellness).     No new C/O  Her last annual assessment has been over 1 year: Annual Physical due on 05/18/2019 available to patient in AVS         She has never smoked tobacco.    CAGE Alcohol screening   Gonzalez Choi was screened for Alcohol abuse and had a score of 0 so is at low risk.     Patient Care Team: Patient Care Team:  Taya Parada MD as PCP - [address]) [id number] MG Nasal Kit Use tid   MetFORMIN HCl 500 MG Oral Tab Take 1 tablet (500 mg total) by mouth 2 (two) times daily with meals. Donepezil HCl 10 MG Oral Tab Take 1 tablet by mouth daily.    LEVOTHYROXINE SODIUM 75 MCG Oral Tab TAKE 1 TABLET(75 M pain  NEURO: denies headaches  PSYCHE: denies depression or anxiety  HEMATOLOGIC: denies hx of anemia  ENDOCRINE: Hx of thyroid history  ALL/ASTHMA: denies hx of allergy or asthma    EXAM:   /80 (BP Location: Left arm, Patient Position: Sitting, Cuff 09/27/2017, 10/10/2018   • Fluzone Vaccine Medicare () 10/06/2016   • HIGH DOSE FLU 65 YRS AND OLDER PRSV FREE SINGLE D (98932) FLU CLINIC 10/06/2016        ASSESSMENT AND OTHER RELEVANT CHRONIC CONDITIONS:   Everett Raphael is a 70year old female w date       Colorectal Cancer Screening      Colonoscopy Screen every 10 years There are no preventive care reminders to display for this patient.  Update Health Maintenance if applicable    Flex Sigmoidoscopy Screen every 10 years No results found for this vaccine history found This may be covered with your prescription benefits, but Medicare does not cover unless Medically needed    Zoster  Not covered by Medicare Part B No vaccine history found This may be covered with your pharmacy  prescription benefits

## 2019-07-18 NOTE — PATIENT INSTRUCTIONS
Sourav Hernandez's SCREENING SCHEDULE   Tests on this list are recommended by your physician but may not be covered, or covered at this frequency, by your insurer. Please check with your insurance carrier before scheduling to verify coverage.    NICOLA years- more often if abnormal There are no preventive care reminders to display for this patient. Update Health Maintenance if applicable    Flex Sigmoidoscopy Screen  Covered every 5 years No results found for this or any previous visit.  No flowsheet data previous visit.  Please get once after your 65th birthday    Pneumococcal 23 (Pneumovax)  Covered Once after 72 Orders placed or performed in visit on 07/18/19   • PNEUMOCOCCAL IMM (PNEUMOVAX)    Please get once after your 65th birthday    Hepatitis B for M

## 2019-07-29 DIAGNOSIS — E03.9 HYPOTHYROIDISM, UNSPECIFIED TYPE: ICD-10-CM

## 2019-07-29 DIAGNOSIS — E78.5 DYSLIPIDEMIA: Primary | ICD-10-CM

## 2019-07-29 RX ORDER — LEVOTHYROXINE SODIUM 0.07 MG/1
TABLET ORAL
Qty: 90 TABLET | Refills: 3 | Status: SHIPPED | OUTPATIENT
Start: 2019-07-29 | End: 2019-10-30

## 2019-07-29 RX ORDER — SIMVASTATIN 20 MG
TABLET ORAL
Qty: 90 TABLET | Refills: 3 | Status: SHIPPED | OUTPATIENT
Start: 2019-07-29 | End: 2019-10-30

## 2019-09-06 DIAGNOSIS — I10 ESSENTIAL HYPERTENSION WITH GOAL BLOOD PRESSURE LESS THAN 140/90: ICD-10-CM

## 2019-09-06 RX ORDER — LISINOPRIL 5 MG/1
TABLET ORAL
Qty: 90 TABLET | Refills: 3 | Status: SHIPPED | OUTPATIENT
Start: 2019-09-06 | End: 2020-08-30

## 2019-10-22 ENCOUNTER — IMMUNIZATION (OUTPATIENT)
Dept: INTERNAL MEDICINE CLINIC | Facility: CLINIC | Age: 79
End: 2019-10-22
Payer: MEDICARE

## 2019-10-22 DIAGNOSIS — Z23 NEED FOR VACCINATION: ICD-10-CM

## 2019-10-22 PROCEDURE — 90662 IIV NO PRSV INCREASED AG IM: CPT | Performed by: INTERNAL MEDICINE

## 2019-10-22 PROCEDURE — G0008 ADMIN INFLUENZA VIRUS VAC: HCPCS | Performed by: INTERNAL MEDICINE

## 2019-10-28 ENCOUNTER — NURSE ONLY (OUTPATIENT)
Dept: INTERNAL MEDICINE CLINIC | Facility: CLINIC | Age: 79
End: 2019-10-28
Payer: MEDICARE

## 2019-10-28 DIAGNOSIS — E78.5 DYSLIPIDEMIA: ICD-10-CM

## 2019-10-28 DIAGNOSIS — E03.9 HYPOTHYROIDISM, UNSPECIFIED TYPE: ICD-10-CM

## 2019-10-28 DIAGNOSIS — I10 ESSENTIAL HYPERTENSION WITH GOAL BLOOD PRESSURE LESS THAN 140/90: ICD-10-CM

## 2019-10-28 DIAGNOSIS — N18.9 CHRONIC RENAL FAILURE, UNSPECIFIED CKD STAGE: ICD-10-CM

## 2019-10-28 DIAGNOSIS — E03.9 ACQUIRED HYPOTHYROIDISM: Primary | ICD-10-CM

## 2019-10-28 PROCEDURE — 84439 ASSAY OF FREE THYROXINE: CPT | Performed by: INTERNAL MEDICINE

## 2019-10-28 PROCEDURE — 84443 ASSAY THYROID STIM HORMONE: CPT | Performed by: INTERNAL MEDICINE

## 2019-10-28 PROCEDURE — 82248 BILIRUBIN DIRECT: CPT | Performed by: INTERNAL MEDICINE

## 2019-10-28 PROCEDURE — 80053 COMPREHEN METABOLIC PANEL: CPT | Performed by: INTERNAL MEDICINE

## 2019-10-28 PROCEDURE — 80061 LIPID PANEL: CPT | Performed by: INTERNAL MEDICINE

## 2019-10-30 ENCOUNTER — OFFICE VISIT (OUTPATIENT)
Dept: INTERNAL MEDICINE CLINIC | Facility: CLINIC | Age: 79
End: 2019-10-30
Payer: MEDICARE

## 2019-10-30 VITALS
TEMPERATURE: 99 F | WEIGHT: 176.31 LBS | HEART RATE: 82 BPM | DIASTOLIC BLOOD PRESSURE: 85 MMHG | RESPIRATION RATE: 18 BRPM | SYSTOLIC BLOOD PRESSURE: 132 MMHG | OXYGEN SATURATION: 95 % | BODY MASS INDEX: 27 KG/M2

## 2019-10-30 DIAGNOSIS — R73.01 IFG (IMPAIRED FASTING GLUCOSE): Primary | ICD-10-CM

## 2019-10-30 PROCEDURE — 99213 OFFICE O/P EST LOW 20 MIN: CPT | Performed by: INTERNAL MEDICINE

## 2019-10-30 PROCEDURE — 83036 HEMOGLOBIN GLYCOSYLATED A1C: CPT | Performed by: INTERNAL MEDICINE

## 2019-10-30 NOTE — PROGRESS NOTES
Clear to Cris Camirea is here with her daughter-in-law. Here for impaired fasting glucose. Does not check home blood sugars. Clinic does have memory issues. Dementia. Physical examination very pleasant individual no acute distress.   Normocephalic carotids

## 2019-12-05 ENCOUNTER — TELEPHONE (OUTPATIENT)
Dept: INTERNAL MEDICINE CLINIC | Facility: CLINIC | Age: 79
End: 2019-12-05

## 2019-12-05 NOTE — TELEPHONE ENCOUNTER
Should patient be on Metformin be once a day or a twice a day? Bob Haro will be asking the family who sets up medication box what they have been putting in.     [facility] will provide [facility] for patient to [facility] program

## 2020-04-07 ENCOUNTER — MED REC SCAN ONLY (OUTPATIENT)
Dept: INTERNAL MEDICINE CLINIC | Facility: CLINIC | Age: 80
End: 2020-04-07

## 2020-05-08 DIAGNOSIS — R73.01 IFG (IMPAIRED FASTING GLUCOSE): ICD-10-CM

## 2020-05-11 ENCOUNTER — TELEPHONE (OUTPATIENT)
Dept: INTERNAL MEDICINE CLINIC | Facility: CLINIC | Age: 80
End: 2020-05-11

## 2020-05-11 DIAGNOSIS — R73.01 IFG (IMPAIRED FASTING GLUCOSE): ICD-10-CM

## 2020-05-11 NOTE — TELEPHONE ENCOUNTER
Patient scheduled for appointment here on 4/14. Esther Mcclure will bring her. Patient does have 2 weeks of Metformin left.     Reconciled current medication list with daughter

## 2020-05-14 ENCOUNTER — OFFICE VISIT (OUTPATIENT)
Dept: INTERNAL MEDICINE CLINIC | Facility: CLINIC | Age: 80
End: 2020-05-14
Payer: MEDICARE

## 2020-05-14 ENCOUNTER — TELEPHONE (OUTPATIENT)
Dept: INTERNAL MEDICINE CLINIC | Facility: CLINIC | Age: 80
End: 2020-05-14

## 2020-05-14 VITALS
BODY MASS INDEX: 28 KG/M2 | RESPIRATION RATE: 18 BRPM | TEMPERATURE: 99 F | HEART RATE: 86 BPM | OXYGEN SATURATION: 94 % | DIASTOLIC BLOOD PRESSURE: 90 MMHG | WEIGHT: 183.5 LBS | SYSTOLIC BLOOD PRESSURE: 155 MMHG

## 2020-05-14 DIAGNOSIS — I10 ELEVATED BLOOD PRESSURE READING IN OFFICE WITH DIAGNOSIS OF HYPERTENSION: ICD-10-CM

## 2020-05-14 DIAGNOSIS — R41.3 MEMORY DEFICIT: ICD-10-CM

## 2020-05-14 DIAGNOSIS — R73.01 IFG (IMPAIRED FASTING GLUCOSE): Primary | ICD-10-CM

## 2020-05-14 PROCEDURE — 82570 ASSAY OF URINE CREATININE: CPT | Performed by: INTERNAL MEDICINE

## 2020-05-14 PROCEDURE — 82043 UR ALBUMIN QUANTITATIVE: CPT | Performed by: INTERNAL MEDICINE

## 2020-05-14 PROCEDURE — 83036 HEMOGLOBIN GLYCOSYLATED A1C: CPT | Performed by: INTERNAL MEDICINE

## 2020-05-14 PROCEDURE — 99214 OFFICE O/P EST MOD 30 MIN: CPT | Performed by: INTERNAL MEDICINE

## 2020-05-14 NOTE — PROGRESS NOTES
Problem #1 impaired fasting glucose this is a very pleasant individual with some memory deficit. She is here by herself. She denies any polyuria dips or aphasia is not sure when she had her last exam.  Today her blood pressure is elevated.   States she ta

## 2020-05-14 NOTE — TELEPHONE ENCOUNTER
Left a message with her son to give an update on today's visit.   See today's note for further information

## 2020-06-09 ENCOUNTER — TELEPHONE (OUTPATIENT)
Dept: INTERNAL MEDICINE CLINIC | Facility: CLINIC | Age: 80
End: 2020-06-09

## 2020-06-09 DIAGNOSIS — R73.01 IFG (IMPAIRED FASTING GLUCOSE): ICD-10-CM

## 2020-06-23 ENCOUNTER — OFFICE VISIT (OUTPATIENT)
Dept: INTERNAL MEDICINE CLINIC | Facility: CLINIC | Age: 80
End: 2020-06-23
Payer: MEDICARE

## 2020-06-23 VITALS
OXYGEN SATURATION: 95 % | HEART RATE: 88 BPM | BODY MASS INDEX: 28.28 KG/M2 | TEMPERATURE: 99 F | WEIGHT: 182.31 LBS | HEIGHT: 67.37 IN | SYSTOLIC BLOOD PRESSURE: 134 MMHG | DIASTOLIC BLOOD PRESSURE: 82 MMHG | RESPIRATION RATE: 18 BRPM

## 2020-06-23 DIAGNOSIS — E03.9 HYPOTHYROIDISM, UNSPECIFIED TYPE: ICD-10-CM

## 2020-06-23 DIAGNOSIS — N18.9 CHRONIC RENAL FAILURE, UNSPECIFIED CKD STAGE: ICD-10-CM

## 2020-06-23 DIAGNOSIS — R41.3 MEMORY DEFICIT: ICD-10-CM

## 2020-06-23 DIAGNOSIS — I10 ESSENTIAL HYPERTENSION WITH GOAL BLOOD PRESSURE LESS THAN 140/90: ICD-10-CM

## 2020-06-23 DIAGNOSIS — E78.5 DYSLIPIDEMIA: ICD-10-CM

## 2020-06-23 DIAGNOSIS — R73.01 IFG (IMPAIRED FASTING GLUCOSE): Primary | ICD-10-CM

## 2020-06-23 PROCEDURE — 99214 OFFICE O/P EST MOD 30 MIN: CPT | Performed by: INTERNAL MEDICINE

## 2020-06-23 NOTE — PROGRESS NOTES
Problem 1 impaired fasting glucose patient denies any polyuria dips or aphasia. Problem #3 memory deficit. She is here with her significant other Delorise Kami.   She does have chronic renal failure stage III unchanged also has hypertension but she denies headach

## 2020-08-30 DIAGNOSIS — I10 ESSENTIAL HYPERTENSION WITH GOAL BLOOD PRESSURE LESS THAN 140/90: ICD-10-CM

## 2020-08-30 RX ORDER — LISINOPRIL 5 MG/1
TABLET ORAL
Qty: 90 TABLET | Refills: 3 | Status: SHIPPED | OUTPATIENT
Start: 2020-08-30 | End: 2020-09-30

## 2020-09-21 ENCOUNTER — TELEPHONE (OUTPATIENT)
Dept: INTERNAL MEDICINE CLINIC | Facility: CLINIC | Age: 80
End: 2020-09-21

## 2020-09-21 NOTE — TELEPHONE ENCOUNTER
Pooja hZeng from the [place] call to inquire about pt's TB gold being drawn prior to pt's move to the [place]. Informed per [facility], there is no test result for this lab.

## 2020-09-25 ENCOUNTER — TELEPHONE (OUTPATIENT)
Dept: INTERNAL MEDICINE CLINIC | Facility: CLINIC | Age: 80
End: 2020-09-25

## 2020-09-25 DIAGNOSIS — Z11.1 SCREENING-PULMONARY TB: Primary | ICD-10-CM

## 2020-09-25 NOTE — TELEPHONE ENCOUNTER
[facility] and needs to have her blood work for TB done. Please order this. Patient is also scheduled to see you Monday for a medicare wellness did you still want to keep this?

## 2020-09-25 NOTE — TELEPHONE ENCOUNTER
Yes I still want to see her but let me know what lab test she needs an out of a diagnosis her TB status

## 2020-09-25 NOTE — TELEPHONE ENCOUNTER
Lela Hernandez from memory care called to let Dr. Solitario Barrett know that she will need the patient to have a quantiferon gold done at her appointment on Monday because she needs the results for Tuesday admission to memory care.

## 2020-09-28 ENCOUNTER — OFFICE VISIT (OUTPATIENT)
Dept: INTERNAL MEDICINE CLINIC | Facility: CLINIC | Age: 80
End: 2020-09-28
Payer: MEDICARE

## 2020-09-28 VITALS
HEART RATE: 68 BPM | HEIGHT: 68.24 IN | TEMPERATURE: 97 F | WEIGHT: 177.63 LBS | RESPIRATION RATE: 20 BRPM | DIASTOLIC BLOOD PRESSURE: 62 MMHG | BODY MASS INDEX: 26.92 KG/M2 | OXYGEN SATURATION: 98 % | SYSTOLIC BLOOD PRESSURE: 122 MMHG

## 2020-09-28 DIAGNOSIS — E03.9 ACQUIRED HYPOTHYROIDISM: ICD-10-CM

## 2020-09-28 DIAGNOSIS — R41.3 MEMORY DEFICIT: Primary | ICD-10-CM

## 2020-09-28 DIAGNOSIS — H35.3132 INTERMEDIATE STAGE NONEXUDATIVE AGE-RELATED MACULAR DEGENERATION OF BOTH EYES: ICD-10-CM

## 2020-09-28 DIAGNOSIS — R73.01 IFG (IMPAIRED FASTING GLUCOSE): ICD-10-CM

## 2020-09-28 DIAGNOSIS — E78.5 DYSLIPIDEMIA: ICD-10-CM

## 2020-09-28 DIAGNOSIS — Z00.00 ENCOUNTER FOR ANNUAL HEALTH EXAMINATION: ICD-10-CM

## 2020-09-28 DIAGNOSIS — I10 ESSENTIAL HYPERTENSION WITH GOAL BLOOD PRESSURE LESS THAN 140/90: ICD-10-CM

## 2020-09-28 DIAGNOSIS — N18.30 CHRONIC RENAL FAILURE, STAGE 3 (MODERATE) (HCC): ICD-10-CM

## 2020-09-28 LAB
ALBUMIN SERPL-MCNC: 3.6 G/DL (ref 3.4–5)
ALBUMIN/GLOB SERPL: 1 {RATIO} (ref 1–2)
ALP LIVER SERPL-CCNC: 83 U/L
ALT SERPL-CCNC: 26 U/L
ANION GAP SERPL CALC-SCNC: 6 MMOL/L (ref 0–18)
AST SERPL-CCNC: 18 U/L (ref 15–37)
BASOPHILS # BLD AUTO: 0.04 X10(3) UL (ref 0–0.2)
BASOPHILS NFR BLD AUTO: 0.6 %
BILIRUB SERPL-MCNC: 0.3 MG/DL (ref 0.1–2)
BUN BLD-MCNC: 16 MG/DL (ref 7–18)
BUN/CREAT SERPL: 13 (ref 10–20)
CALCIUM BLD-MCNC: 10.1 MG/DL (ref 8.5–10.1)
CHLORIDE SERPL-SCNC: 109 MMOL/L (ref 98–112)
CHOLEST SMN-MCNC: 149 MG/DL (ref ?–200)
CO2 SERPL-SCNC: 28 MMOL/L (ref 21–32)
CREAT BLD-MCNC: 1.23 MG/DL
DEPRECATED RDW RBC AUTO: 45.1 FL (ref 35.1–46.3)
EOSINOPHIL # BLD AUTO: 0.15 X10(3) UL (ref 0–0.7)
EOSINOPHIL NFR BLD AUTO: 2.1 %
ERYTHROCYTE [DISTWIDTH] IN BLOOD BY AUTOMATED COUNT: 13.2 % (ref 11–15)
EST. AVERAGE GLUCOSE BLD GHB EST-MCNC: 131 MG/DL (ref 68–126)
GLOBULIN PLAS-MCNC: 3.6 G/DL (ref 2.8–4.4)
GLUCOSE BLD-MCNC: 101 MG/DL (ref 70–99)
HBA1C MFR BLD HPLC: 6.2 % (ref ?–5.7)
HCT VFR BLD AUTO: 50.3 %
HDLC SERPL-MCNC: 51 MG/DL (ref 40–59)
HGB BLD-MCNC: 15.8 G/DL
IMM GRANULOCYTES # BLD AUTO: 0.02 X10(3) UL (ref 0–1)
IMM GRANULOCYTES NFR BLD: 0.3 %
LDLC SERPL CALC-MCNC: 49 MG/DL (ref ?–100)
LYMPHOCYTES # BLD AUTO: 2.57 X10(3) UL (ref 1–4)
LYMPHOCYTES NFR BLD AUTO: 35.5 %
M PROTEIN MFR SERPL ELPH: 7.2 G/DL (ref 6.4–8.2)
MCH RBC QN AUTO: 29 PG (ref 26–34)
MCHC RBC AUTO-ENTMCNC: 31.4 G/DL (ref 31–37)
MCV RBC AUTO: 92.3 FL
MONOCYTES # BLD AUTO: 0.68 X10(3) UL (ref 0.1–1)
MONOCYTES NFR BLD AUTO: 9.4 %
NEUTROPHILS # BLD AUTO: 3.78 X10 (3) UL (ref 1.5–7.7)
NEUTROPHILS # BLD AUTO: 3.78 X10(3) UL (ref 1.5–7.7)
NEUTROPHILS NFR BLD AUTO: 52.1 %
NONHDLC SERPL-MCNC: 98 MG/DL (ref ?–130)
OSMOLALITY SERPL CALC.SUM OF ELEC: 297 MOSM/KG (ref 275–295)
PATIENT FASTING Y/N/NP: YES
PATIENT FASTING Y/N/NP: YES
PLATELET # BLD AUTO: 247 10(3)UL (ref 150–450)
POTASSIUM SERPL-SCNC: 4.1 MMOL/L (ref 3.5–5.1)
RBC # BLD AUTO: 5.45 X10(6)UL
SODIUM SERPL-SCNC: 143 MMOL/L (ref 136–145)
TRIGL SERPL-MCNC: 243 MG/DL (ref 30–149)
TSI SER-ACNC: 3.18 MIU/ML (ref 0.36–3.74)
VLDLC SERPL CALC-MCNC: 49 MG/DL (ref 0–30)
WBC # BLD AUTO: 7.2 X10(3) UL (ref 4–11)

## 2020-09-28 PROCEDURE — 86480 TB TEST CELL IMMUN MEASURE: CPT | Performed by: INTERNAL MEDICINE

## 2020-09-28 PROCEDURE — 80053 COMPREHEN METABOLIC PANEL: CPT | Performed by: INTERNAL MEDICINE

## 2020-09-28 PROCEDURE — 84443 ASSAY THYROID STIM HORMONE: CPT | Performed by: INTERNAL MEDICINE

## 2020-09-28 PROCEDURE — 83036 HEMOGLOBIN GLYCOSYLATED A1C: CPT | Performed by: INTERNAL MEDICINE

## 2020-09-28 PROCEDURE — G0439 PPPS, SUBSEQ VISIT: HCPCS | Performed by: INTERNAL MEDICINE

## 2020-09-28 PROCEDURE — 80061 LIPID PANEL: CPT | Performed by: INTERNAL MEDICINE

## 2020-09-28 PROCEDURE — 85025 COMPLETE CBC W/AUTO DIFF WBC: CPT | Performed by: INTERNAL MEDICINE

## 2020-09-28 NOTE — PATIENT INSTRUCTIONS
Liat Hernandez's SCREENING SCHEDULE   Tests on this list are recommended by your physician but may not be covered, or covered at this frequency, by your insurer. Please check with your insurance carrier before scheduling to verify coverage.    NICOLA 10 years- more often if abnormal There are no preventive care reminders to display for this patient. Update Health Maintenance if applicable    Flex Sigmoidoscopy Screen  Covered every 5 years No results found for this or any previous visit.  No flowsheet d year    Pneumococcal 13 (Prevnar)  Covered Once after 65 No orders found for this or any previous visit.  Please get once after your 65th birthday    Pneumococcal 23 (Pneumovax)  Covered Once after 65 Orders placed or performed in visit on 07/18/19   • PNEU

## 2020-09-28 NOTE — PROGRESS NOTES
HPI:   Jess Mcguire is a 68year old female who presents for a Medicare Subsequent Annual Wellness visit (Pt already had Initial Annual Wellness).     No new C/O  Her last annual assessment has been over 1 year: Annual Physical due on 07/18/2020 without help   She has difficulties Taking Meds as Rx'd based on screening of functional status. Taking medications as prescribed: Cannot do without help   She has Hearing problems based on screening of functional status.    Hearing Problems?: Yes  She ha Dyslipidemia     Intermediate stage nonexudative age-related macular degeneration of both eyes     CRF (chronic renal failure)    Wt Readings from Last 3 Encounters:  06/23/20 : 182 lb 4.8 oz (82.7 kg)  05/14/20 : 183 lb 8 oz (83.2 kg)  10/30/19 : 176 lb 4 complaint of urinary incontinence   MUSCULOSKELETAL: denies back pain  NEURO: denies headaches  PSYCHE: denies depression or anxiety  HEMATOLOGIC: denies hx of anemia  ENDOCRINE: denies thyroid history  ALL/ASTHMA: denies hx of allergy or asthma    EXAM: ([id number]) 09/27/2017, 10/10/2018   • Fluzone Vaccine Medicare () 10/06/2016   • HIGH DOSE FLU 65 YRS AND OLDER PRSV FREE SINGLE D (42118) FLU CLINIC 10/06/2016   • Pneumovax 23 07/18/2019        ASSESSMENT AND OTHER RELEVANT CHRONIC CONDITIONS:   Gerhard Hay Lab or Procedure   Diabetes Screening      HbgA1C   Annually Lab Results   Component Value Date    A1C 5.9 (H) 05/14/2020    No flowsheet data found.     Fasting Blood Sugar (FSB)Annually Glucose (mg/dL)   Date Value   10/28/2019 93          Cardiovascular Hepatitis B for Moderate/High Risk No vaccine history found Medium/high risk factors:   End-stage renal disease   Hemophiliacs who received Factor VIII or IX concentrates   Clients of institutions for the mentally retarded   Persons who live in the [facility]

## 2020-09-30 DIAGNOSIS — E03.9 ACQUIRED HYPOTHYROIDISM: Primary | ICD-10-CM

## 2020-09-30 DIAGNOSIS — R73.01 IFG (IMPAIRED FASTING GLUCOSE): ICD-10-CM

## 2020-09-30 DIAGNOSIS — I10 ESSENTIAL HYPERTENSION WITH GOAL BLOOD PRESSURE LESS THAN 140/90: ICD-10-CM

## 2020-09-30 LAB
M TB IFN-G CD4+ T-CELLS BLD-ACNC: 0.03 IU/ML
M TB TUBERC IFN-G BLD QL: NEGATIVE
M TB TUBERC IGNF/MITOGEN IGNF CONTROL: 8.5 IU/ML
QUANTIFERON TB1 MINUS NIL: 0 IU/ML
QUANTIFERON TB2 MINUS NIL: 0.03 IU/ML

## 2020-09-30 RX ORDER — LEVOTHYROXINE SODIUM 0.07 MG/1
75 TABLET ORAL
Qty: 90 TABLET | Refills: 2 | Status: SHIPPED | OUTPATIENT
Start: 2020-09-30 | End: 2021-05-28

## 2020-09-30 RX ORDER — MEMANTINE HYDROCHLORIDE 10 MG/1
10 TABLET ORAL 2 TIMES DAILY
Qty: 90 TABLET | Refills: 3 | Status: SHIPPED | OUTPATIENT
Start: 2020-09-30 | End: 2021-02-23

## 2020-09-30 RX ORDER — SACCHAROMYCES BOULARDII 250 MG
250 CAPSULE ORAL DAILY
Qty: 90 CAPSULE | Refills: 3 | Status: SHIPPED | OUTPATIENT
Start: 2020-09-30 | End: 2021-11-25

## 2020-09-30 RX ORDER — VIT A/VIT C/VIT E/ZINC/COPPER 2148-113
1 TABLET ORAL 2 TIMES DAILY
Qty: 180 TABLET | Refills: 3 | Status: SHIPPED | OUTPATIENT
Start: 2020-09-30

## 2020-09-30 RX ORDER — CETIRIZINE HYDROCHLORIDE 10 MG/1
10 TABLET ORAL AS NEEDED
Qty: 90 TABLET | Refills: 3 | Status: SHIPPED | OUTPATIENT
Start: 2020-09-30 | End: 2020-12-29

## 2020-09-30 RX ORDER — DONEPEZIL HYDROCHLORIDE 10 MG/1
10 TABLET, FILM COATED ORAL DAILY
Qty: 90 TABLET | Refills: 3 | Status: SHIPPED | OUTPATIENT
Start: 2020-09-30

## 2020-09-30 RX ORDER — LISINOPRIL 5 MG/1
5 TABLET ORAL DAILY
Qty: 90 TABLET | Refills: 3 | Status: SHIPPED | OUTPATIENT
Start: 2020-09-30 | End: 2021-11-25

## 2020-09-30 RX ORDER — SIMVASTATIN 20 MG
20 TABLET ORAL NIGHTLY
Qty: 90 TABLET | Refills: 0 | Status: SHIPPED | OUTPATIENT
Start: 2020-09-30 | End: 2020-11-27

## 2020-09-30 RX ORDER — ASPIRIN 81 MG/1
81 TABLET ORAL DAILY
Qty: 90 TABLET | Refills: 3 | Status: SHIPPED | OUTPATIENT
Start: 2020-09-30 | End: 2020-12-29

## 2020-09-30 RX ORDER — ACETAMINOPHEN 500 MG
500 TABLET ORAL EVERY 6 HOURS PRN
Qty: 90 TABLET | Refills: 3 | Status: SHIPPED | OUTPATIENT
Start: 2020-09-30

## 2020-11-04 RX ORDER — CALCIUM CARBONATE/VITAMIN D3 600 MG-10
TABLET ORAL
Qty: 90 TABLET | Refills: 0 | Status: SHIPPED | OUTPATIENT
Start: 2020-11-04 | End: 2020-12-14

## 2020-11-19 NOTE — PROGRESS NOTES
Is a very pleasant 28-year-old female who was admitted to the [facility]. She does have dementia. Patient also have hypothyroidism, impaired fasting glucose dyslipidemia and hypertension. Patient does recognize me.   Denies any headaches dizziness chest raven

## 2020-11-27 RX ORDER — SIMVASTATIN 20 MG
TABLET ORAL
Qty: 90 TABLET | Refills: 2 | Status: SHIPPED | OUTPATIENT
Start: 2020-11-27 | End: 2020-11-30

## 2020-11-30 RX ORDER — SIMVASTATIN 20 MG
20 TABLET ORAL NIGHTLY
Qty: 90 TABLET | Refills: 2 | Status: SHIPPED | OUTPATIENT
Start: 2020-11-30

## 2020-12-14 RX ORDER — CALCIUM CARBONATE/VITAMIN D3 600 MG-10
TABLET ORAL
Qty: 90 TABLET | Refills: 3 | Status: SHIPPED | OUTPATIENT
Start: 2020-12-14

## 2021-01-20 RX ORDER — FLUTICASONE PROPIONATE 50 MCG
SPRAY, SUSPENSION (ML) NASAL
Qty: 16 G | Refills: 2 | Status: SHIPPED | OUTPATIENT
Start: 2021-01-20 | End: 2021-07-29

## 2021-02-23 RX ORDER — MEMANTINE HYDROCHLORIDE 10 MG/1
TABLET ORAL
Qty: 90 TABLET | Refills: 3 | Status: SHIPPED | OUTPATIENT
Start: 2021-02-23 | End: 2021-08-09

## 2021-02-23 NOTE — TELEPHONE ENCOUNTER
Future appt:     Your appointments     Date & Time Appointment Department [facility])    Aug 18, 2021 10:00 AM CDT Established Patient Office Visit with Raghu Jonas, [facility]    For

## 2021-04-01 NOTE — PROGRESS NOTES
Is a very pleasant 68-year-old female with dementia, hypertension chronic kidney disease impaired fasting glucose dyslipidemia and hypothyroidism. Patient denies any complaints. Staff reports no issues.     Physical examination pleasant alert well Paul

## 2021-05-04 ENCOUNTER — TELEPHONE (OUTPATIENT)
Dept: INTERNAL MEDICINE CLINIC | Facility: CLINIC | Age: 81
End: 2021-05-04

## 2021-05-04 NOTE — PROGRESS NOTES
This 45-year-old female with memory issues. Been complaining of frontal sinus pain. Congestion. Does have history of hypertension hypothyroidism and impaired fasting glucose. She denies any fever chills. Staff reports vital signs stable.     Physical e

## 2021-05-05 ENCOUNTER — NURSE ONLY (OUTPATIENT)
Dept: LAB | Age: 81
End: 2021-05-05
Attending: INTERNAL MEDICINE
Payer: MEDICARE

## 2021-05-05 DIAGNOSIS — I10 ESSENTIAL HYPERTENSION WITH GOAL BLOOD PRESSURE LESS THAN 140/90: ICD-10-CM

## 2021-05-05 PROCEDURE — 80053 COMPREHEN METABOLIC PANEL: CPT

## 2021-05-05 PROCEDURE — 85025 COMPLETE CBC W/AUTO DIFF WBC: CPT

## 2021-05-10 ENCOUNTER — TELEPHONE (OUTPATIENT)
Dept: INTERNAL MEDICINE CLINIC | Facility: CLINIC | Age: 81
End: 2021-05-10

## 2021-05-10 ENCOUNTER — EXTERNAL FACILITY (OUTPATIENT)
Dept: INTERNAL MEDICINE CLINIC | Facility: CLINIC | Age: 81
End: 2021-05-10

## 2021-05-10 DIAGNOSIS — J01.90 ACUTE NON-RECURRENT SINUSITIS, UNSPECIFIED LOCATION: Primary | ICD-10-CM

## 2021-05-10 RX ORDER — LACTOBACILLUS ACIDOPHILUS 20B CELL
1 CAPSULE ORAL 2 TIMES DAILY
Qty: 60 CAPSULE | Refills: 0 | Status: SHIPPED | OUTPATIENT
Start: 2021-05-10 | End: 2021-05-10

## 2021-05-10 RX ORDER — NAPROXEN SODIUM 220 MG
1-2 TABLET ORAL 2 TIMES DAILY
Qty: 40 TABLET | Refills: 0 | Status: SHIPPED | OUTPATIENT
Start: 2021-05-10 | End: 2021-05-10

## 2021-05-10 RX ORDER — NAPROXEN SODIUM 220 MG
TABLET ORAL
Qty: 60 TABLET | Refills: 1 | Status: SHIPPED | OUTPATIENT
Start: 2021-05-10 | End: 2021-11-25

## 2021-05-10 RX ORDER — AMOXICILLIN AND CLAVULANATE POTASSIUM 875; 125 MG/1; MG/1
1 TABLET, FILM COATED ORAL 2 TIMES DAILY
Qty: 14 TABLET | Refills: 0 | Status: SHIPPED | OUTPATIENT
Start: 2021-05-10 | End: 2021-11-25

## 2021-05-10 NOTE — TELEPHONE ENCOUNTER
Naproxyn (Aleve) is ordere 1-2 tablets bid. Is it scheduled BID or do you want PRN?     They cannot do \"1-2\" - Please order either 1 tablet OR 2 tablets

## 2021-05-10 NOTE — PROGRESS NOTES
Received a note from nurse Chhaya Osorio. Still complaining of sinus pain Tylenol is not helping. Been present for over 2 weeks. Will start patient on Aleve 1 to 2 tablets twice daily for 10 days. Augmentin twice daily for 7 days.   Acidophilus 1 twice daily for

## 2021-05-17 RX ORDER — CALCIUM CARBONATE/VITAMIN D3 600 MG-10
TABLET ORAL
Qty: 90 TABLET | Refills: 3 | Status: SHIPPED | OUTPATIENT
Start: 2021-05-17

## 2021-05-28 DIAGNOSIS — E03.9 ACQUIRED HYPOTHYROIDISM: ICD-10-CM

## 2021-05-28 RX ORDER — LEVOTHYROXINE SODIUM 0.07 MG/1
TABLET ORAL
Qty: 90 TABLET | Refills: 0 | Status: SHIPPED | OUTPATIENT
Start: 2021-05-28

## 2021-07-02 RX ORDER — L.ACIDOPHIL/L.PLANTAR/BIFIDO 7 15B CELL
CAPSULE ORAL
Qty: 60 CAPSULE | Refills: 0 | OUTPATIENT
Start: 2021-07-02

## 2021-07-29 RX ORDER — FLUTICASONE PROPIONATE 50 MCG
SPRAY, SUSPENSION (ML) NASAL
Qty: 16 G | Refills: 2 | Status: SHIPPED | OUTPATIENT
Start: 2021-07-29 | End: 2021-11-25

## 2021-08-09 RX ORDER — MEMANTINE HYDROCHLORIDE 10 MG/1
TABLET ORAL
Qty: 90 TABLET | Refills: 3 | Status: SHIPPED | OUTPATIENT
Start: 2021-08-09

## 2021-09-02 NOTE — PROGRESS NOTES
This a very pleasant 45-year-old patient with history of dementia. Hypothyroidism impaired fasting glucose and hypertension. Patient denies any headaches dizziness no chest pain shortness of breath no abdominal pain.   Moving her bowels no polyuria dysuri

## 2021-10-04 RX ORDER — NYSTATIN 100000 [USP'U]/G
POWDER TOPICAL
Qty: 60 G | Refills: 1 | Status: SHIPPED | OUTPATIENT
Start: 2021-10-04

## 2021-10-14 ENCOUNTER — NURSE ONLY (OUTPATIENT)
Dept: LAB | Age: 81
End: 2021-10-14
Attending: INTERNAL MEDICINE
Payer: MEDICARE

## 2021-10-14 DIAGNOSIS — R73.01 IFG (IMPAIRED FASTING GLUCOSE): ICD-10-CM

## 2021-10-14 PROCEDURE — 83036 HEMOGLOBIN GLYCOSYLATED A1C: CPT

## 2021-11-23 ENCOUNTER — EXTERNAL FACILITY (OUTPATIENT)
Dept: INTERNAL MEDICINE CLINIC | Facility: CLINIC | Age: 81
End: 2021-11-23

## 2021-11-23 DIAGNOSIS — G30.0 EARLY ONSET ALZHEIMER'S DEMENTIA WITHOUT BEHAVIORAL DISTURBANCE (HCC): ICD-10-CM

## 2021-11-23 DIAGNOSIS — R73.01 IFG (IMPAIRED FASTING GLUCOSE): ICD-10-CM

## 2021-11-23 DIAGNOSIS — Z76.89 ENCOUNTER TO ESTABLISH CARE: Primary | ICD-10-CM

## 2021-11-23 DIAGNOSIS — E03.9 ACQUIRED HYPOTHYROIDISM: ICD-10-CM

## 2021-11-23 DIAGNOSIS — I10 ESSENTIAL HYPERTENSION WITH GOAL BLOOD PRESSURE LESS THAN 140/90: ICD-10-CM

## 2021-11-23 DIAGNOSIS — G44.52 NEW DAILY PERSISTENT HEADACHE: ICD-10-CM

## 2021-11-23 DIAGNOSIS — R09.82 POSTNASAL DRIP: ICD-10-CM

## 2021-11-23 DIAGNOSIS — F02.80 EARLY ONSET ALZHEIMER'S DEMENTIA WITHOUT BEHAVIORAL DISTURBANCE (HCC): ICD-10-CM

## 2021-11-25 PROBLEM — F02.80 ALZHEIMER'S DEMENTIA (HCC): Status: ACTIVE | Noted: 2021-11-25

## 2021-11-25 PROBLEM — G30.0 EARLY ONSET ALZHEIMER'S DEMENTIA WITHOUT BEHAVIORAL DISTURBANCE (HCC): Status: ACTIVE | Noted: 2021-11-25

## 2021-11-25 PROBLEM — G30.9 ALZHEIMER'S DEMENTIA (HCC): Status: ACTIVE | Noted: 2021-11-25

## 2021-11-25 PROBLEM — G30.9 ALZHEIMER'S DEMENTIA (HCC): Status: RESOLVED | Noted: 2021-11-25 | Resolved: 2021-11-25

## 2021-11-25 PROBLEM — N18.9 CRF (CHRONIC RENAL FAILURE): Status: RESOLVED | Noted: 2017-05-09 | Resolved: 2021-11-25

## 2021-11-25 PROBLEM — F02.80 EARLY ONSET ALZHEIMER'S DEMENTIA WITHOUT BEHAVIORAL DISTURBANCE (HCC): Status: ACTIVE | Noted: 2021-11-25

## 2021-11-25 PROBLEM — F02.80 ALZHEIMER'S DEMENTIA (HCC): Status: RESOLVED | Noted: 2021-11-25 | Resolved: 2021-11-25

## 2021-11-25 RX ORDER — GUAIFENESIN 600 MG
1200 TABLET, EXTENDED RELEASE 12 HR ORAL 2 TIMES DAILY
Qty: 60 TABLET | Refills: 5 | COMMUNITY
Start: 2021-11-25

## 2021-11-25 RX ORDER — LISINOPRIL 10 MG/1
10 TABLET ORAL DAILY
Qty: 90 TABLET | Refills: 3 | COMMUNITY
Start: 2021-11-25

## 2021-11-25 RX ORDER — FEXOFENADINE HCL 180 MG/1
180 TABLET ORAL DAILY
Qty: 30 TABLET | Refills: 0 | COMMUNITY
Start: 2021-11-25

## 2021-11-25 RX ORDER — IPRATROPIUM BROMIDE 42 UG/1
2 SPRAY, METERED NASAL 4 TIMES DAILY
Qty: 1 EACH | Refills: 0 | COMMUNITY
Start: 2021-11-25

## 2021-11-25 NOTE — PROGRESS NOTES
HPI: here to assume care for [facility] in [place]. Met with nse, Romel Foster, to discuss care needs and follow-ed up with Josue Colon per phone.       Simran Ayala awakens each morning (for months) with exact same complaint that she has a \"sinus headache and post na historian reviewed  (x ) Telephone time with pt or authorized family member-non F2F  ( ) Case/studies discussed with other caregivers-non F2F  ( ) Family meeting-pt not present-non F2F      I have discussed with the patient the results of tests, differenti total) by mouth nightly. 90 tablet 2   • Donepezil HCl 10 MG Oral Tab Take 1 tablet (10 mg total) by mouth daily. 90 tablet 3   • lisinopril 5 MG Oral Tab Take 1 tablet (5 mg total) by mouth daily.  90 tablet 3   • metFORMIN HCl 500 MG Oral Tab Take 1 table

## 2021-11-26 ENCOUNTER — NURSE ONLY (OUTPATIENT)
Dept: LAB | Age: 81
End: 2021-11-26
Attending: INTERNAL MEDICINE
Payer: MEDICARE

## 2021-11-26 DIAGNOSIS — E03.9 ACQUIRED HYPOTHYROIDISM: Primary | ICD-10-CM

## 2021-11-26 PROCEDURE — 84443 ASSAY THYROID STIM HORMONE: CPT

## 2022-01-08 DIAGNOSIS — Z78.0 POSTMENOPAUSAL STATE: Primary | ICD-10-CM

## 2022-01-20 DIAGNOSIS — Z12.31 ENCOUNTER FOR SCREENING MAMMOGRAM FOR MALIGNANT NEOPLASM OF BREAST: Primary | ICD-10-CM

## 2022-01-21 NOTE — PROGRESS NOTES
HPI: Here to see Puri Didier in routine follow up.   She is a 80year old with significant short term memory loss but   Amiable personality and good sense of humor.      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  90 tablet 3   • fexofenadine 180 M

## 2022-03-03 ENCOUNTER — EXTERNAL FACILITY (OUTPATIENT)
Dept: INTERNAL MEDICINE CLINIC | Facility: CLINIC | Age: 82
End: 2022-03-03

## 2022-03-04 PROBLEM — S01.80XA: Status: ACTIVE | Noted: 2022-03-04

## 2022-03-07 ENCOUNTER — HOSPITAL ENCOUNTER (OUTPATIENT)
Dept: BONE DENSITY | Age: 82
Discharge: HOME | End: 2022-03-07
Attending: INTERNAL MEDICINE
Payer: MEDICARE

## 2022-03-07 DIAGNOSIS — Z78.0 POSTMENOPAUSAL STATE: ICD-10-CM

## 2022-03-07 PROCEDURE — 77080 DXA BONE DENSITY AXIAL: CPT | Performed by: INTERNAL MEDICINE

## 2022-05-10 ENCOUNTER — EXTERNAL FACILITY (OUTPATIENT)
Dept: INTERNAL MEDICINE CLINIC | Facility: CLINIC | Age: 82
End: 2022-05-10

## 2022-05-10 PROBLEM — S09.90XA HEAD INJURY: Status: ACTIVE | Noted: 2022-05-10

## 2022-05-10 PROBLEM — S01.80XA: Status: RESOLVED | Noted: 2022-03-04 | Resolved: 2022-05-10

## 2022-05-10 PROBLEM — S00.03XA CONTUSION OF SCALP: Status: ACTIVE | Noted: 2022-05-10

## 2022-07-16 ENCOUNTER — TELEPHONE (OUTPATIENT)
Dept: INTERNAL MEDICINE CLINIC | Facility: CLINIC | Age: 82
End: 2022-07-16

## 2022-07-16 RX ORDER — QUETIAPINE FUMARATE 25 MG/1
25 TABLET, FILM COATED ORAL NIGHTLY
Qty: 90 TABLET | Refills: 3 | COMMUNITY
Start: 2022-07-16

## 2022-07-17 NOTE — TELEPHONE ENCOUNTER
PC from [facility] on [place]: Cali Malik continues to have disruptive beahvior despite increase in Trazodone. Will start Seroquel 25 at 3 pm if family agrees.

## 2022-07-19 PROBLEM — G30.1 LATE ONSET ALZHEIMER'S DEMENTIA WITH BEHAVIORAL DISTURBANCE (HCC): Status: ACTIVE | Noted: 2022-07-19

## 2022-07-19 PROBLEM — M85.80 OSTEOPENIA: Status: ACTIVE | Noted: 2022-07-19

## 2022-07-19 PROBLEM — S09.90XA HEAD INJURY: Status: RESOLVED | Noted: 2022-05-10 | Resolved: 2022-07-19

## 2022-07-19 PROBLEM — G30.0 EARLY ONSET ALZHEIMER'S DEMENTIA WITHOUT BEHAVIORAL DISTURBANCE (HCC): Status: RESOLVED | Noted: 2021-11-25 | Resolved: 2022-07-19

## 2022-07-19 PROBLEM — S00.03XA CONTUSION OF SCALP: Status: RESOLVED | Noted: 2022-05-10 | Resolved: 2022-07-19

## 2022-07-19 PROBLEM — F02.80 EARLY ONSET ALZHEIMER'S DEMENTIA WITHOUT BEHAVIORAL DISTURBANCE (HCC): Status: RESOLVED | Noted: 2021-11-25 | Resolved: 2022-07-19

## 2022-07-19 PROBLEM — F02.81 LATE ONSET ALZHEIMER'S DEMENTIA WITH BEHAVIORAL DISTURBANCE (HCC): Status: ACTIVE | Noted: 2022-07-19

## 2022-07-19 PROBLEM — F02.818 LATE ONSET ALZHEIMER'S DEMENTIA WITH BEHAVIORAL DISTURBANCE (HCC): Status: ACTIVE | Noted: 2022-07-19

## 2022-10-06 NOTE — H&P
Patient is here with her friend Valentin Wolfe. For last 3 days she has been feeling well feeling fatigued some neck pain. She is having diarrhea no blood in stool. Diarrhea is soft and mushy at times liquid. Denies a headache fever chills or night sweats.     P
strong bilaterally

## 2022-11-28 ENCOUNTER — LAB REQUISITION (OUTPATIENT)
Dept: LAB | Facility: HOSPITAL | Age: 82
End: 2022-11-28
Attending: INTERNAL MEDICINE
Payer: MEDICARE

## 2022-11-28 DIAGNOSIS — E03.9 HYPOTHYROIDISM, UNSPECIFIED: ICD-10-CM

## 2022-11-28 LAB — TSI SER-ACNC: 1.82 MIU/ML (ref 0.36–3.74)

## 2022-11-28 PROCEDURE — 84443 ASSAY THYROID STIM HORMONE: CPT | Performed by: INTERNAL MEDICINE

## 2023-01-12 ENCOUNTER — APPOINTMENT (OUTPATIENT)
Dept: LAB | Age: 83
End: 2023-01-12
Attending: INTERNAL MEDICINE
Payer: MEDICARE

## 2023-01-21 ENCOUNTER — EXTERNAL FACILITY (OUTPATIENT)
Dept: INTERNAL MEDICINE CLINIC | Facility: CLINIC | Age: 83
End: 2023-01-21

## 2023-01-21 DIAGNOSIS — G30.1 LATE ONSET ALZHEIMER'S DEMENTIA WITH BEHAVIORAL DISTURBANCE (HCC): ICD-10-CM

## 2023-01-21 DIAGNOSIS — R53.81 PHYSICAL DECONDITIONING: ICD-10-CM

## 2023-01-21 DIAGNOSIS — E03.9 ACQUIRED HYPOTHYROIDISM: ICD-10-CM

## 2023-01-21 DIAGNOSIS — I10 ESSENTIAL HYPERTENSION WITH GOAL BLOOD PRESSURE LESS THAN 140/90: ICD-10-CM

## 2023-01-21 DIAGNOSIS — U07.1 COVID-19 VIRUS INFECTION: Primary | ICD-10-CM

## 2023-01-21 DIAGNOSIS — F02.818 LATE ONSET ALZHEIMER'S DEMENTIA WITH BEHAVIORAL DISTURBANCE (HCC): ICD-10-CM

## 2023-01-21 DIAGNOSIS — M85.80 OSTEOPENIA, UNSPECIFIED LOCATION: ICD-10-CM

## 2023-01-21 PROCEDURE — 99309 SBSQ NF CARE MODERATE MDM 30: CPT | Performed by: INTERNAL MEDICINE

## 2023-01-21 RX ORDER — LISINOPRIL 10 MG/1
TABLET ORAL
Qty: 90 TABLET | Refills: 3 | Status: SHIPPED | OUTPATIENT
Start: 2023-01-21 | End: 2023-01-21

## 2023-01-21 RX ORDER — LISINOPRIL 20 MG/1
20 TABLET ORAL DAILY
Qty: 90 TABLET | Refills: 3 | COMMUNITY
Start: 2023-01-21

## 2023-01-21 RX ORDER — QUETIAPINE FUMARATE 25 MG/1
25 TABLET, FILM COATED ORAL NIGHTLY
Qty: 90 TABLET | Refills: 3 | COMMUNITY
Start: 2023-01-21

## 2023-01-23 ENCOUNTER — MED REC SCAN ONLY (OUTPATIENT)
Dept: INTERNAL MEDICINE CLINIC | Facility: CLINIC | Age: 83
End: 2023-01-23

## 2023-02-21 ENCOUNTER — EXTERNAL FACILITY (OUTPATIENT)
Dept: INTERNAL MEDICINE CLINIC | Facility: CLINIC | Age: 83
End: 2023-02-21

## 2023-02-21 DIAGNOSIS — G30.1 LATE ONSET ALZHEIMER'S DEMENTIA WITH BEHAVIORAL DISTURBANCE (HCC): ICD-10-CM
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
## 2023-02-21 DIAGNOSIS — I10 ESSENTIAL HYPERTENSION WITH GOAL BLOOD PRESSURE LESS THAN 140/90: ICD-10-CM

## 2023-02-21 DIAGNOSIS — S00.81XA FACIAL ABRASION, INITIAL ENCOUNTER: ICD-10-CM

## 2023-02-21 DIAGNOSIS — R53.81 PHYSICAL DECONDITIONING: ICD-10-CM

## 2023-02-21 DIAGNOSIS — F02.818 LATE ONSET ALZHEIMER'S DEMENTIA WITH BEHAVIORAL DISTURBANCE (HCC): ICD-10-CM

## 2023-02-21 PROCEDURE — 99214 OFFICE O/P EST MOD 30 MIN: CPT | Performed by: INTERNAL MEDICINE

## 2023-02-23 PROBLEM — U07.1 COVID-19 VIRUS INFECTION: Status: RESOLVED | Noted: 2023-01-11 | Resolved: 2023-02-23

## 2023-02-23 PROBLEM — S00.81XA FACIAL ABRASION, INITIAL ENCOUNTER: Status: ACTIVE | Noted: 2023-02-23
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
## 2023-06-29 ENCOUNTER — EXTERNAL FACILITY (OUTPATIENT)
Dept: INTERNAL MEDICINE CLINIC | Facility: CLINIC | Age: 83
End: 2023-06-29

## 2023-06-29 DIAGNOSIS — E03.9 ACQUIRED HYPOTHYROIDISM: ICD-10-CM

## 2023-06-29 DIAGNOSIS — G30.1 LATE ONSET ALZHEIMER'S DEMENTIA WITH BEHAVIORAL DISTURBANCE (HCC): ICD-10-CM

## 2023-06-29 DIAGNOSIS — I10 ESSENTIAL HYPERTENSION WITH GOAL BLOOD PRESSURE LESS THAN 140/90: ICD-10-CM

## 2023-06-29 DIAGNOSIS — Z51.89 ENCOUNTER FOR MEDICATION ADJUSTMENT: Primary | ICD-10-CM

## 2023-06-29 DIAGNOSIS — F02.818 LATE ONSET ALZHEIMER'S DEMENTIA WITH BEHAVIORAL DISTURBANCE (HCC): ICD-10-CM

## 2023-06-29 PROCEDURE — 99309 SBSQ NF CARE MODERATE MDM 30: CPT | Performed by: INTERNAL MEDICINE

## 2023-07-11 ENCOUNTER — APPOINTMENT (OUTPATIENT)
Dept: LAB | Age: 83
End: 2023-07-11
Attending: INTERNAL MEDICINE
Payer: MEDICARE

## 2023-09-17 RX ORDER — QUETIAPINE FUMARATE 50 MG/1
50 TABLET, FILM COATED ORAL NIGHTLY
COMMUNITY
Start: 2023-09-17

## 2024-01-09 ENCOUNTER — APPOINTMENT (OUTPATIENT)
Dept: LAB | Age: 84
End: 2024-01-09
Attending: HOSPITALIST
Payer: MEDICARE

## 2024-02-03 ENCOUNTER — HOSPITAL ENCOUNTER (INPATIENT)
Facility: HOSPITAL | Age: 84
LOS: 6 days | Discharge: NURSING, REHAB OR LONG-TERM CARE | End: 2024-02-09
Attending: EMERGENCY MEDICINE | Admitting: INTERNAL MEDICINE
Payer: MEDICARE

## 2024-02-03 ENCOUNTER — ANESTHESIA (OUTPATIENT)
Dept: EMERGENCY DEPT | Facility: HOSPITAL | Age: 84
End: 2024-02-03
Payer: MEDICARE

## 2024-02-03 ENCOUNTER — APPOINTMENT (OUTPATIENT)
Dept: GENERAL RADIOLOGY | Facility: HOSPITAL | Age: 84
End: 2024-02-03
Attending: EMERGENCY MEDICINE
Payer: MEDICARE

## 2024-02-03 ENCOUNTER — APPOINTMENT (OUTPATIENT)
Dept: CT IMAGING | Facility: HOSPITAL | Age: 84
End: 2024-02-03
Attending: EMERGENCY MEDICINE
Payer: MEDICARE

## 2024-02-03 ENCOUNTER — ANESTHESIA EVENT (OUTPATIENT)
Dept: EMERGENCY DEPT | Facility: HOSPITAL | Age: 84
End: 2024-02-03
Payer: MEDICARE

## 2024-02-03 DIAGNOSIS — S72.002A CLOSED FRACTURE OF LEFT HIP, INITIAL ENCOUNTER (HCC): Primary | ICD-10-CM

## 2024-02-03 DIAGNOSIS — S72.009A HIP FRACTURE (HCC): ICD-10-CM

## 2024-02-03 LAB
ALBUMIN SERPL-MCNC: 3.7 G/DL (ref 3.4–5)
ALBUMIN/GLOB SERPL: 1.1 {RATIO} (ref 1–2)
ALP LIVER SERPL-CCNC: 113 U/L
ALT SERPL-CCNC: 30 U/L
ANION GAP SERPL CALC-SCNC: 4 MMOL/L (ref 0–18)
AST SERPL-CCNC: 20 U/L (ref 15–37)
BASOPHILS # BLD AUTO: 0.03 X10(3) UL (ref 0–0.2)
BASOPHILS NFR BLD AUTO: 0.2 %
BILIRUB SERPL-MCNC: 0.4 MG/DL (ref 0.1–2)
BUN BLD-MCNC: 15 MG/DL (ref 9–23)
CALCIUM BLD-MCNC: 9.7 MG/DL (ref 8.5–10.1)
CHLORIDE SERPL-SCNC: 111 MMOL/L (ref 98–112)
CO2 SERPL-SCNC: 26 MMOL/L (ref 21–32)
CREAT BLD-MCNC: 0.99 MG/DL
EGFRCR SERPLBLD CKD-EPI 2021: 57 ML/MIN/1.73M2 (ref 60–?)
EOSINOPHIL # BLD AUTO: 0 X10(3) UL (ref 0–0.7)
EOSINOPHIL NFR BLD AUTO: 0 %
ERYTHROCYTE [DISTWIDTH] IN BLOOD BY AUTOMATED COUNT: 12.3 %
GLOBULIN PLAS-MCNC: 3.4 G/DL (ref 2.8–4.4)
GLUCOSE BLD-MCNC: 110 MG/DL (ref 70–99)
HCT VFR BLD AUTO: 45.1 %
HGB BLD-MCNC: 14.9 G/DL
IMM GRANULOCYTES # BLD AUTO: 0.08 X10(3) UL (ref 0–1)
IMM GRANULOCYTES NFR BLD: 0.5 %
INR BLD: 1 (ref 0.8–1.2)
LYMPHOCYTES # BLD AUTO: 1.55 X10(3) UL (ref 1–4)
LYMPHOCYTES NFR BLD AUTO: 10.4 %
MCH RBC QN AUTO: 29.9 PG (ref 26–34)
MCHC RBC AUTO-ENTMCNC: 33 G/DL (ref 31–37)
MCV RBC AUTO: 90.6 FL
MONOCYTES # BLD AUTO: 0.71 X10(3) UL (ref 0.1–1)
MONOCYTES NFR BLD AUTO: 4.8 %
MRSA NASAL: NEGATIVE
NEUTROPHILS # BLD AUTO: 12.49 X10 (3) UL (ref 1.5–7.7)
NEUTROPHILS # BLD AUTO: 12.49 X10(3) UL (ref 1.5–7.7)
NEUTROPHILS NFR BLD AUTO: 84.1 %
OSMOLALITY SERPL CALC.SUM OF ELEC: 293 MOSM/KG (ref 275–295)
PLATELET # BLD AUTO: 208 10(3)UL (ref 150–450)
POTASSIUM SERPL-SCNC: 3.8 MMOL/L (ref 3.5–5.1)
PROCALCITONIN SERPL-MCNC: <0.05 NG/ML (ref ?–0.16)
PROT SERPL-MCNC: 7.1 G/DL (ref 6.4–8.2)
PROTHROMBIN TIME: 13.2 SECONDS (ref 11.6–14.8)
RBC # BLD AUTO: 4.98 X10(6)UL
SARS-COV-2 RNA RESP QL NAA+PROBE: NOT DETECTED
SODIUM SERPL-SCNC: 141 MMOL/L (ref 136–145)
STAPH A BY PCR: NEGATIVE
WBC # BLD AUTO: 14.9 X10(3) UL (ref 4–11)

## 2024-02-03 PROCEDURE — 73560 X-RAY EXAM OF KNEE 1 OR 2: CPT | Performed by: EMERGENCY MEDICINE

## 2024-02-03 PROCEDURE — 99223 1ST HOSP IP/OBS HIGH 75: CPT | Performed by: HOSPITALIST

## 2024-02-03 PROCEDURE — 3E0T3BZ INTRODUCTION OF ANESTHETIC AGENT INTO PERIPHERAL NERVES AND PLEXI, PERCUTANEOUS APPROACH: ICD-10-PCS | Performed by: ANESTHESIOLOGY

## 2024-02-03 PROCEDURE — 70450 CT HEAD/BRAIN W/O DYE: CPT | Performed by: EMERGENCY MEDICINE

## 2024-02-03 PROCEDURE — 73502 X-RAY EXAM HIP UNI 2-3 VIEWS: CPT | Performed by: EMERGENCY MEDICINE

## 2024-02-03 PROCEDURE — 99497 ADVNCD CARE PLAN 30 MIN: CPT | Performed by: HOSPITALIST

## 2024-02-03 PROCEDURE — 71045 X-RAY EXAM CHEST 1 VIEW: CPT | Performed by: EMERGENCY MEDICINE

## 2024-02-03 RX ORDER — LIDOCAINE HYDROCHLORIDE 10 MG/ML
2 INJECTION, SOLUTION EPIDURAL; INFILTRATION; INTRACAUDAL; PERINEURAL AS NEEDED
Status: DISCONTINUED | OUTPATIENT
Start: 2024-02-03 | End: 2024-02-08 | Stop reason: ALTCHOICE

## 2024-02-03 RX ORDER — DEXTROSE AND SODIUM CHLORIDE 5; .45 G/100ML; G/100ML
INJECTION, SOLUTION INTRAVENOUS CONTINUOUS
Status: DISCONTINUED | OUTPATIENT
Start: 2024-02-03 | End: 2024-02-07

## 2024-02-03 RX ORDER — ATORVASTATIN CALCIUM 10 MG/1
10 TABLET, FILM COATED ORAL NIGHTLY
Status: DISCONTINUED | OUTPATIENT
Start: 2024-02-03 | End: 2024-02-09

## 2024-02-03 RX ORDER — POLYETHYLENE GLYCOL 3350 17 G/17G
17 POWDER, FOR SOLUTION ORAL DAILY PRN
Status: DISCONTINUED | OUTPATIENT
Start: 2024-02-03 | End: 2024-02-04

## 2024-02-03 RX ORDER — ONDANSETRON 2 MG/ML
4 INJECTION INTRAMUSCULAR; INTRAVENOUS EVERY 6 HOURS PRN
Status: DISCONTINUED | OUTPATIENT
Start: 2024-02-03 | End: 2024-02-04

## 2024-02-03 RX ORDER — ENEMA 19; 7 G/133ML; G/133ML
1 ENEMA RECTAL ONCE AS NEEDED
Status: DISCONTINUED | OUTPATIENT
Start: 2024-02-03 | End: 2024-02-08 | Stop reason: ALTCHOICE

## 2024-02-03 RX ORDER — LISINOPRIL 20 MG/1
20 TABLET ORAL DAILY
Status: DISCONTINUED | OUTPATIENT
Start: 2024-02-03 | End: 2024-02-09

## 2024-02-03 RX ORDER — SENNOSIDES 8.6 MG
17.2 TABLET ORAL NIGHTLY PRN
Status: DISCONTINUED | OUTPATIENT
Start: 2024-02-03 | End: 2024-02-09

## 2024-02-03 RX ORDER — FLUOXETINE 10 MG/1
10 TABLET, FILM COATED ORAL DAILY
Status: DISCONTINUED | OUTPATIENT
Start: 2024-02-03 | End: 2024-02-09

## 2024-02-03 RX ORDER — ROPIVACAINE HYDROCHLORIDE 5 MG/ML
30 INJECTION, SOLUTION EPIDURAL; INFILTRATION; PERINEURAL AS NEEDED
Status: DISCONTINUED | OUTPATIENT
Start: 2024-02-03 | End: 2024-02-08 | Stop reason: ALTCHOICE

## 2024-02-03 RX ORDER — TRAZODONE HYDROCHLORIDE 50 MG/1
50 TABLET ORAL NIGHTLY
Status: DISCONTINUED | OUTPATIENT
Start: 2024-02-03 | End: 2024-02-09

## 2024-02-03 RX ORDER — LEVOTHYROXINE SODIUM 0.07 MG/1
75 TABLET ORAL
Status: DISCONTINUED | OUTPATIENT
Start: 2024-02-03 | End: 2024-02-09

## 2024-02-03 RX ORDER — ONDANSETRON 2 MG/ML
INJECTION INTRAMUSCULAR; INTRAVENOUS
Status: DISPENSED
Start: 2024-02-03 | End: 2024-02-04

## 2024-02-03 RX ORDER — HEPARIN SODIUM 5000 [USP'U]/ML
5000 INJECTION, SOLUTION INTRAVENOUS; SUBCUTANEOUS EVERY 12 HOURS SCHEDULED
Status: DISCONTINUED | OUTPATIENT
Start: 2024-02-03 | End: 2024-02-07

## 2024-02-03 RX ORDER — BISACODYL 10 MG
10 SUPPOSITORY, RECTAL RECTAL
Status: DISCONTINUED | OUTPATIENT
Start: 2024-02-03 | End: 2024-02-04

## 2024-02-03 RX ORDER — MORPHINE SULFATE 2 MG/ML
2 INJECTION, SOLUTION INTRAMUSCULAR; INTRAVENOUS EVERY 30 MIN PRN
Status: DISCONTINUED | OUTPATIENT
Start: 2024-02-03 | End: 2024-02-03

## 2024-02-03 RX ORDER — MORPHINE SULFATE 2 MG/ML
1 INJECTION, SOLUTION INTRAMUSCULAR; INTRAVENOUS EVERY 2 HOUR PRN
Status: DISCONTINUED | OUTPATIENT
Start: 2024-02-03 | End: 2024-02-05

## 2024-02-03 RX ORDER — ACETAMINOPHEN 500 MG
500 TABLET ORAL EVERY 4 HOURS PRN
Status: DISCONTINUED | OUTPATIENT
Start: 2024-02-03 | End: 2024-02-09

## 2024-02-03 RX ORDER — MELATONIN
3 NIGHTLY PRN
Status: DISCONTINUED | OUTPATIENT
Start: 2024-02-03 | End: 2024-02-09

## 2024-02-03 RX ORDER — DONEPEZIL HYDROCHLORIDE 10 MG/1
10 TABLET, FILM COATED ORAL DAILY
Status: DISCONTINUED | OUTPATIENT
Start: 2024-02-03 | End: 2024-02-09

## 2024-02-03 RX ORDER — SODIUM CHLORIDE 9 MG/ML
10 INJECTION, SOLUTION INTRAMUSCULAR; INTRAVENOUS; SUBCUTANEOUS AS NEEDED
Status: DISCONTINUED | OUTPATIENT
Start: 2024-02-03 | End: 2024-02-09

## 2024-02-03 RX ORDER — METOCLOPRAMIDE HYDROCHLORIDE 5 MG/ML
5 INJECTION INTRAMUSCULAR; INTRAVENOUS EVERY 8 HOURS PRN
Status: DISCONTINUED | OUTPATIENT
Start: 2024-02-03 | End: 2024-02-04

## 2024-02-03 RX ORDER — MORPHINE SULFATE 4 MG/ML
4 INJECTION, SOLUTION INTRAMUSCULAR; INTRAVENOUS EVERY 2 HOUR PRN
Status: DISCONTINUED | OUTPATIENT
Start: 2024-02-03 | End: 2024-02-05

## 2024-02-03 RX ORDER — QUETIAPINE FUMARATE 25 MG/1
25 TABLET, FILM COATED ORAL NIGHTLY
Status: DISCONTINUED | OUTPATIENT
Start: 2024-02-03 | End: 2024-02-09

## 2024-02-03 RX ORDER — MORPHINE SULFATE 2 MG/ML
2 INJECTION, SOLUTION INTRAMUSCULAR; INTRAVENOUS EVERY 2 HOUR PRN
Status: DISCONTINUED | OUTPATIENT
Start: 2024-02-03 | End: 2024-02-05

## 2024-02-03 NOTE — ED INITIAL ASSESSMENT (HPI)
BROUGHT BY MEDIC FROM  [facility] WITH HISTORY OF TRIPPED AND FELL DOWN . FELL ON HER LEFT SIDE COMPLAINING OF LEFT HIP PAIN . NO LOSS OF CONSCIOUSNESS  DID NOT HIT HER HEAD  NOT ON ANY BLOOD THINNER

## 2024-02-04 ENCOUNTER — ANESTHESIA (OUTPATIENT)
Dept: SURGERY | Facility: HOSPITAL | Age: 84
End: 2024-02-04
Payer: MEDICARE

## 2024-02-04 ENCOUNTER — ANESTHESIA EVENT (OUTPATIENT)
Dept: SURGERY | Facility: HOSPITAL | Age: 84
End: 2024-02-04
Payer: MEDICARE

## 2024-02-04 ENCOUNTER — APPOINTMENT (OUTPATIENT)
Dept: GENERAL RADIOLOGY | Facility: HOSPITAL | Age: 84
End: 2024-02-04
Attending: ORTHOPAEDIC SURGERY
Payer: MEDICARE

## 2024-02-04 PROBLEM — F03.C11 SEVERE DEMENTIA WITH AGITATION (HCC): Status: ACTIVE | Noted: 2022-07-19

## 2024-02-04 LAB
ANION GAP SERPL CALC-SCNC: 3 MMOL/L (ref 0–18)
ANTIBODY SCREEN: NEGATIVE
BASOPHILS # BLD AUTO: 0.02 X10(3) UL (ref 0–0.2)
BASOPHILS NFR BLD AUTO: 0.2 %
BUN BLD-MCNC: 14 MG/DL (ref 9–23)
CALCIUM BLD-MCNC: 9.2 MG/DL (ref 8.5–10.1)
CHLORIDE SERPL-SCNC: 111 MMOL/L (ref 98–112)
CO2 SERPL-SCNC: 28 MMOL/L (ref 21–32)
CREAT BLD-MCNC: 0.9 MG/DL
EGFRCR SERPLBLD CKD-EPI 2021: 64 ML/MIN/1.73M2 (ref 60–?)
EOSINOPHIL # BLD AUTO: 0.04 X10(3) UL (ref 0–0.7)
EOSINOPHIL NFR BLD AUTO: 0.4 %
ERYTHROCYTE [DISTWIDTH] IN BLOOD BY AUTOMATED COUNT: 12.6 %
GLUCOSE BLD-MCNC: 106 MG/DL (ref 70–99)
HCT VFR BLD AUTO: 42.5 %
HGB BLD-MCNC: 13.6 G/DL
IMM GRANULOCYTES # BLD AUTO: 0.05 X10(3) UL (ref 0–1)
IMM GRANULOCYTES NFR BLD: 0.5 %
LYMPHOCYTES # BLD AUTO: 2.4 X10(3) UL (ref 1–4)
LYMPHOCYTES NFR BLD AUTO: 23.3 %
MAGNESIUM SERPL-MCNC: 2 MG/DL (ref 1.6–2.6)
MCH RBC QN AUTO: 30.1 PG (ref 26–34)
MCHC RBC AUTO-ENTMCNC: 32 G/DL (ref 31–37)
MCV RBC AUTO: 94 FL
MONOCYTES # BLD AUTO: 1.2 X10(3) UL (ref 0.1–1)
MONOCYTES NFR BLD AUTO: 11.7 %
NEUTROPHILS # BLD AUTO: 6.59 X10 (3) UL (ref 1.5–7.7)
NEUTROPHILS # BLD AUTO: 6.59 X10(3) UL (ref 1.5–7.7)
NEUTROPHILS NFR BLD AUTO: 63.9 %
OSMOLALITY SERPL CALC.SUM OF ELEC: 295 MOSM/KG (ref 275–295)
PLATELET # BLD AUTO: 175 10(3)UL (ref 150–450)
POTASSIUM SERPL-SCNC: 3.9 MMOL/L (ref 3.5–5.1)
RBC # BLD AUTO: 4.52 X10(6)UL
RH BLOOD TYPE: POSITIVE
SODIUM SERPL-SCNC: 142 MMOL/L (ref 136–145)
WBC # BLD AUTO: 10.3 X10(3) UL (ref 4–11)

## 2024-02-04 PROCEDURE — 0SRS0JA REPLACEMENT OF LEFT HIP JOINT, FEMORAL SURFACE WITH SYNTHETIC SUBSTITUTE, UNCEMENTED, OPEN APPROACH: ICD-10-PCS | Performed by: ORTHOPAEDIC SURGERY

## 2024-02-04 PROCEDURE — 73501 X-RAY EXAM HIP UNI 1 VIEW: CPT | Performed by: ORTHOPAEDIC SURGERY

## 2024-02-04 PROCEDURE — 99232 SBSQ HOSP IP/OBS MODERATE 35: CPT | Performed by: HOSPITALIST

## 2024-02-04 DEVICE — IMPLANTABLE DEVICE: Type: IMPLANTABLE DEVICE | Site: HIP | Status: FUNCTIONAL

## 2024-02-04 RX ORDER — CEFAZOLIN SODIUM 1 G/3ML
INJECTION, POWDER, FOR SOLUTION INTRAMUSCULAR; INTRAVENOUS AS NEEDED
Status: DISCONTINUED | OUTPATIENT
Start: 2024-02-04 | End: 2024-02-04 | Stop reason: SURG

## 2024-02-04 RX ORDER — SODIUM CHLORIDE, SODIUM LACTATE, POTASSIUM CHLORIDE, CALCIUM CHLORIDE 600; 310; 30; 20 MG/100ML; MG/100ML; MG/100ML; MG/100ML
INJECTION, SOLUTION INTRAVENOUS CONTINUOUS PRN
Status: DISCONTINUED | OUTPATIENT
Start: 2024-02-04 | End: 2024-02-04 | Stop reason: SURG

## 2024-02-04 RX ORDER — POLYETHYLENE GLYCOL 3350 17 G/17G
17 POWDER, FOR SOLUTION ORAL DAILY PRN
Status: DISCONTINUED | OUTPATIENT
Start: 2024-02-04 | End: 2024-02-09

## 2024-02-04 RX ORDER — HYDROCODONE BITARTRATE AND ACETAMINOPHEN 5; 325 MG/1; MG/1
2 TABLET ORAL ONCE AS NEEDED
Status: DISCONTINUED | OUTPATIENT
Start: 2024-02-04 | End: 2024-02-04 | Stop reason: HOSPADM

## 2024-02-04 RX ORDER — METOCLOPRAMIDE HYDROCHLORIDE 5 MG/ML
10 INJECTION INTRAMUSCULAR; INTRAVENOUS EVERY 8 HOURS PRN
Status: DISCONTINUED | OUTPATIENT
Start: 2024-02-04 | End: 2024-02-09

## 2024-02-04 RX ORDER — ASPIRIN 81 MG/1
81 TABLET ORAL 2 TIMES DAILY
Status: DISCONTINUED | OUTPATIENT
Start: 2024-02-04 | End: 2024-02-08

## 2024-02-04 RX ORDER — HYDROMORPHONE HYDROCHLORIDE 1 MG/ML
0.4 INJECTION, SOLUTION INTRAMUSCULAR; INTRAVENOUS; SUBCUTANEOUS EVERY 5 MIN PRN
Status: DISCONTINUED | OUTPATIENT
Start: 2024-02-04 | End: 2024-02-04 | Stop reason: HOSPADM

## 2024-02-04 RX ORDER — ONDANSETRON 2 MG/ML
4 INJECTION INTRAMUSCULAR; INTRAVENOUS EVERY 6 HOURS PRN
Status: DISCONTINUED | OUTPATIENT
Start: 2024-02-04 | End: 2024-02-04 | Stop reason: HOSPADM

## 2024-02-04 RX ORDER — BUPIVACAINE HYDROCHLORIDE 5 MG/ML
INJECTION, SOLUTION EPIDURAL; INTRACAUDAL AS NEEDED
Status: DISCONTINUED | OUTPATIENT
Start: 2024-02-04 | End: 2024-02-04 | Stop reason: HOSPADM

## 2024-02-04 RX ORDER — HYDROMORPHONE HYDROCHLORIDE 1 MG/ML
0.2 INJECTION, SOLUTION INTRAMUSCULAR; INTRAVENOUS; SUBCUTANEOUS EVERY 5 MIN PRN
Status: DISCONTINUED | OUTPATIENT
Start: 2024-02-04 | End: 2024-02-04 | Stop reason: HOSPADM

## 2024-02-04 RX ORDER — HYDROCODONE BITARTRATE AND ACETAMINOPHEN 5; 325 MG/1; MG/1
1 TABLET ORAL ONCE AS NEEDED
Status: DISCONTINUED | OUTPATIENT
Start: 2024-02-04 | End: 2024-02-04 | Stop reason: HOSPADM

## 2024-02-04 RX ORDER — ROCURONIUM BROMIDE 10 MG/ML
INJECTION, SOLUTION INTRAVENOUS AS NEEDED
Status: DISCONTINUED | OUTPATIENT
Start: 2024-02-04 | End: 2024-02-04 | Stop reason: SURG

## 2024-02-04 RX ORDER — DOXEPIN HYDROCHLORIDE 50 MG/1
1 CAPSULE ORAL DAILY
Status: DISCONTINUED | OUTPATIENT
Start: 2024-02-05 | End: 2024-02-09

## 2024-02-04 RX ORDER — HYDROMORPHONE HYDROCHLORIDE 1 MG/ML
0.6 INJECTION, SOLUTION INTRAMUSCULAR; INTRAVENOUS; SUBCUTANEOUS EVERY 5 MIN PRN
Status: DISCONTINUED | OUTPATIENT
Start: 2024-02-04 | End: 2024-02-04 | Stop reason: HOSPADM

## 2024-02-04 RX ORDER — DEXAMETHASONE SODIUM PHOSPHATE 4 MG/ML
VIAL (ML) INJECTION AS NEEDED
Status: DISCONTINUED | OUTPATIENT
Start: 2024-02-04 | End: 2024-02-04 | Stop reason: SURG

## 2024-02-04 RX ORDER — SENNOSIDES 8.6 MG
17.2 TABLET ORAL NIGHTLY
Status: DISCONTINUED | OUTPATIENT
Start: 2024-02-04 | End: 2024-02-09

## 2024-02-04 RX ORDER — ONDANSETRON 2 MG/ML
4 INJECTION INTRAMUSCULAR; INTRAVENOUS EVERY 6 HOURS PRN
Status: DISCONTINUED | OUTPATIENT
Start: 2024-02-04 | End: 2024-02-09

## 2024-02-04 RX ORDER — ENEMA 19; 7 G/133ML; G/133ML
1 ENEMA RECTAL ONCE AS NEEDED
Status: DISCONTINUED | OUTPATIENT
Start: 2024-02-04 | End: 2024-02-09

## 2024-02-04 RX ORDER — NALOXONE HYDROCHLORIDE 0.4 MG/ML
0.08 INJECTION, SOLUTION INTRAMUSCULAR; INTRAVENOUS; SUBCUTANEOUS AS NEEDED
Status: DISCONTINUED | OUTPATIENT
Start: 2024-02-04 | End: 2024-02-04 | Stop reason: HOSPADM

## 2024-02-04 RX ORDER — DOCUSATE SODIUM 100 MG/1
100 CAPSULE, LIQUID FILLED ORAL 2 TIMES DAILY
Status: DISCONTINUED | OUTPATIENT
Start: 2024-02-04 | End: 2024-02-09

## 2024-02-04 RX ORDER — ACETAMINOPHEN 500 MG
1000 TABLET ORAL ONCE AS NEEDED
Status: DISCONTINUED | OUTPATIENT
Start: 2024-02-04 | End: 2024-02-04 | Stop reason: HOSPADM

## 2024-02-04 RX ORDER — ONDANSETRON 2 MG/ML
INJECTION INTRAMUSCULAR; INTRAVENOUS AS NEEDED
Status: DISCONTINUED | OUTPATIENT
Start: 2024-02-04 | End: 2024-02-04 | Stop reason: SURG

## 2024-02-04 RX ORDER — LABETALOL HYDROCHLORIDE 5 MG/ML
INJECTION, SOLUTION INTRAVENOUS AS NEEDED
Status: DISCONTINUED | OUTPATIENT
Start: 2024-02-04 | End: 2024-02-04 | Stop reason: SURG

## 2024-02-04 RX ORDER — BISACODYL 10 MG
10 SUPPOSITORY, RECTAL RECTAL
Status: DISCONTINUED | OUTPATIENT
Start: 2024-02-04 | End: 2024-02-09

## 2024-02-04 RX ORDER — CEFAZOLIN SODIUM/WATER 2 G/20 ML
2 SYRINGE (ML) INTRAVENOUS EVERY 8 HOURS
Qty: 40 ML | Refills: 0 | Status: COMPLETED | OUTPATIENT
Start: 2024-02-04 | End: 2024-02-05

## 2024-02-04 RX ORDER — TRANEXAMIC ACID 10 MG/ML
INJECTION, SOLUTION INTRAVENOUS AS NEEDED
Status: DISCONTINUED | OUTPATIENT
Start: 2024-02-04 | End: 2024-02-04 | Stop reason: SURG

## 2024-02-04 RX ORDER — PHENYLEPHRINE HCL 10 MG/ML
VIAL (ML) INJECTION AS NEEDED
Status: DISCONTINUED | OUTPATIENT
Start: 2024-02-04 | End: 2024-02-04 | Stop reason: SURG

## 2024-02-04 RX ORDER — SODIUM CHLORIDE, SODIUM LACTATE, POTASSIUM CHLORIDE, CALCIUM CHLORIDE 600; 310; 30; 20 MG/100ML; MG/100ML; MG/100ML; MG/100ML
INJECTION, SOLUTION INTRAVENOUS CONTINUOUS
Status: DISCONTINUED | OUTPATIENT
Start: 2024-02-04 | End: 2024-02-04 | Stop reason: HOSPADM

## 2024-02-04 RX ADMIN — TRANEXAMIC ACID 1000 MG: 10 INJECTION, SOLUTION INTRAVENOUS at 09:44:00

## 2024-02-04 RX ADMIN — CEFAZOLIN SODIUM 2 G: 1 INJECTION, POWDER, FOR SOLUTION INTRAMUSCULAR; INTRAVENOUS at 09:37:00

## 2024-02-04 RX ADMIN — ONDANSETRON 4 MG: 2 INJECTION INTRAMUSCULAR; INTRAVENOUS at 10:22:00

## 2024-02-04 RX ADMIN — ROCURONIUM BROMIDE 50 MG: 10 INJECTION, SOLUTION INTRAVENOUS at 09:35:00

## 2024-02-04 RX ADMIN — SODIUM CHLORIDE, SODIUM LACTATE, POTASSIUM CHLORIDE, CALCIUM CHLORIDE: 600; 310; 30; 20 INJECTION, SOLUTION INTRAVENOUS at 09:30:00

## 2024-02-04 RX ADMIN — DEXAMETHASONE SODIUM PHOSPHATE 4 MG: 4 MG/ML VIAL (ML) INJECTION at 10:26:00

## 2024-02-04 RX ADMIN — PHENYLEPHRINE HCL 50 MCG: 10 MG/ML VIAL (ML) INJECTION at 10:39:00

## 2024-02-04 RX ADMIN — PHENYLEPHRINE HCL 50 MCG: 10 MG/ML VIAL (ML) INJECTION at 09:55:00

## 2024-02-04 RX ADMIN — PHENYLEPHRINE HCL 100 MCG: 10 MG/ML VIAL (ML) INJECTION at 10:26:00

## 2024-02-04 RX ADMIN — LABETALOL HYDROCHLORIDE 5 MG: 5 INJECTION, SOLUTION INTRAVENOUS at 10:17:00

## 2024-02-04 RX ADMIN — PHENYLEPHRINE HCL 50 MCG: 10 MG/ML VIAL (ML) INJECTION at 09:58:00

## 2024-02-04 RX ADMIN — PHENYLEPHRINE HCL 100 MCG: 10 MG/ML VIAL (ML) INJECTION at 10:23:00

## 2024-02-04 NOTE — ANESTHESIA PROCEDURE NOTES
Airway  Date/Time: 2/4/2024 9:37 AM  Urgency: elective      General Information and Staff    Patient location during procedure: OR  Anesthesiologist: Cheri Jones MD  Performed: anesthesiologist   Performed by: Cheri Jones MD  Authorized by: Cheri Jones MD      Indications and Patient Condition  Indications for airway management: anesthesia  Sedation level: deep  Preoxygenated: yes  Patient position: sniffing  Mask difficulty assessment: 1 - vent by mask    Final Airway Details  Final airway type: endotracheal airway      Successful airway: ETT  Cuffed: yes   Successful intubation technique: direct laryngoscopy  Facilitating devices/methods: intubating stylet  Endotracheal tube insertion site: oral  Blade: Nima  Blade size: #3  ETT size (mm): 7.0    Cormack-Lehane Classification: grade IIA - partial view of glottis  Placement verified by: capnometry   Cuff volume (mL): 10  Measured from: lips  ETT to lips (cm): 22  Number of attempts at approach: 1  Number of other approaches attempted: 0    Additional Comments  DENTITION  UNCHANGED FROM PRE OP EXAM

## 2024-02-04 NOTE — ED PROVIDER NOTES
Patient Seen in: [facility] Emergency Department      History     Chief Complaint   Patient presents with    Fall     Stated Complaint:     Subjective:   HPI    83-year-old woman history of advanced dementia, here for evaluation of left hip pain.  Patient is from the memory care unit, reportedly was walking tripped over somebody else's walker fell down onto her left lateral hip.  Did not strike her head or sustain any other injuries.  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    SKIN SURGERY Left 04/27/2016    ED&C of SCCIS to L Upper Arm by SD    SKIN SURGERY Left 04/27/2016    ED&C of SCCIS to L Upper Back by SD                Social History     Socioeconomic History    Marital status:    Tobacco Use    Smoking status: Never    Smokeless tobacco: Never   Substance and Sexual Activity    Alcohol use: Yes     Alcohol/week: 1.0 standard drink of alcohol     Types: 1 Glasses of wine per week     Comment: 1-2 per week    Drug use: No   Other Topics Concern    Caffeine Concern Yes     Comment: 1-2 per day    Exercise No    Seat Belt Yes     Social Determinants of Health     Food Insecurity: No Food Insecurity (2/3/2024)    Food Insecurity     Food Insecurity: Never true   Transportation Needs: No Transportation Needs (2/3/2024)    Transportation Needs     Lack of Transportation: No   Housing Stability: Low Risk  (2/3/2024)    Housing Stability     Housing Instability: No              Review of Systems    Positive for stated complaint:   Other systems are as noted in HPI.  Constitutional and vital signs reviewed.      All  other systems reviewed and negative except as noted above.    Physical Exam     ED Triage Vitals [02/03/24 1644]   /82   Pulse 80   Resp 16   Temp 98.2 °F (36.8 °C)   Temp src Temporal   SpO2 99 %   O2 Device None (Room air)       Current:/57 (BP Location: Left arm)   Pulse 77   Temp 97.5 °F (36.4 °C) (Axillary)   Resp 16   Ht 162.6 cm (5' 4\")   Wt 80.7 kg   SpO2 95%   BMI 30.55 kg/m²         Physical Exam        Physical Exam  Vitals signs and nursing note reviewed.   General: Elderly woman lying supine in the bed in no acute distress  Head: Normocephalic and atraumatic.   HEENT:  Mucous membranes are moist.   Cardiovascular:  Normal rate and regular rhythm.  No Edema  Pulmonary:  Pulmonary effort is normal.  Normal breath sounds. No wheezing, rhonchi or rales.   Abdominal: Soft nontender nondistended, normal bowel sounds, no guarding no rebound tenderness  Musculoskeletal: Patient reports severe pain in the left hip with any range of motion she holds the hip in flexion with flexion at the knee as well.  She is able to move all toes flex and extend the left ankle DP pulses 2+.  Skin: Warm and dry  Neurological: Awake alert, speech is normal        ED Course     Labs Reviewed   COMP METABOLIC PANEL (14) - Abnormal; Notable for the following components:       Result Value    Glucose 110 (*)     eGFR-Cr 57 (*)     All other components within normal limits   CBC W/ DIFFERENTIAL - Abnormal; Notable for the following components:    WBC 14.9 (*)     Neutrophil Absolute Prelim 12.49 (*)     Neutrophil Absolute 12.49 (*)     All other components within normal limits   PROCALCITONIN - Normal    Narrative:     Resulted by: batch: K, CO2, CL, NA, ALB, TBIL, ALT, AST, ALP, CA, CREA, BUN, GLUC,    PROTHROMBIN TIME (PT) - Normal   MRSA/SA SCRN BY PCR:EMERG ORTHO SURG ONLY - Normal   RAPID SARS-COV-2 BY PCR - Normal   CBC WITH DIFFERENTIAL WITH PLATELET    Narrative:     The following orders were created for  panel order CBC With Differential With Platelet.  Procedure                               Abnormality         Status                     ---------                               -----------         ------                     CBC W/ DIFFERENTIAL[[id number]]          Abnormal            Final result                 Please view results for these tests on the individual orders.   RAINBOW DRAW LAVENDER   RAINBOW DRAW LIGHT GREEN   RAINBOW DRAW BLUE   RAINBOW DRAW GOLD             ReapplyCT BRAIN OR HEAD ([id number])    Result Date: 2/3/2024  PROCEDURE:  CT BRAIN OR HEAD (56889)  COMPARISON:  [address], MR, MRI BRAIN (CPT=70551), 11/18/2016, 8:38 AM.  INDICATIONS:  fall  TECHNIQUE:  Noncontrast CT scanning is performed through the brain. Dose reduction techniques were used. Dose information is transmitted to the ACR (American College of Radiology) NRDR (National Radiology Data Registry) which includes the Dose Index Registry.  PATIENT STATED HISTORY: (As transcribed by Technologist)  Patient had fall with confusion and weakness.    FINDINGS:  VENTRICLES/SULCI:  Moderate symmetric enlargement of ventricular system and cortical sulci.  No extra-axial fluid collection or midline shift. INTRACRANIAL:  No intracranial mass or hemorrhage.  No sign of acute territorial infarction.  Symmetric low attenuation of cerebral white matter consistent with chronic small vessel ischemic changes. SINUSES:           No sign of acute sinusitis.  MASTOIDS:          No sign of acute inflammation. SKULL:             No evidence for fracture or osseous abnormality. OTHER:             Markedly enlarged right mandibular condyle with sclerosis and remodeling of the temporomandibular joint is unchanged from MRI of 11/18/2016.            CONCLUSION:  1. No acute intracranial pathology. 2. Global atrophy and chronic small vessel ischemic changes of the cerebral white matter.    LOCATION:  [id number]   Dictated by (CST): Arthur Temple MD on 2/03/2024 at 6:54 PM      Finalized by (CST): Arthur Temple MD on 2/03/2024 at 6:58 PM       XR CHEST AP PORTABLE  (CPT=71045)    Result Date: 2/3/2024  PROCEDURE:  XR CHEST AP PORTABLE  (CPT=71045)  TECHNIQUE:  AP chest radiograph was obtained.  COMPARISON:  None.  INDICATIONS:  hip frx  PATIENT STATED HISTORY: (As transcribed by Technologist)  Patient stated falling today and has left hip pain.    FINDINGS:  Normal heart size and pulmonary vascularity.  Mildly tortuous aorta.  Angular retrocardiac opacity.            CONCLUSION:  Left basilar atelectasis versus pneumonia.   LOCATION:  [id number]      Dictated by (CST): Arthur Temple MD on 2/03/2024 at 6:18 PM     Finalized by (CST): Arthur Temple MD on 2/03/2024 at 6:19 PM       XR HIP W OR WO PELVIS 2 OR 3 VIEWS, LEFT (CPT=73502)    Result Date: 2/3/2024  PROCEDURE:  XR HIP W OR WO PELVIS 2 OR 3 VIEWS, LEFT (CPT=73502)  TECHNIQUE:  Unilateral 2 to 3 views of the hip and pelvis if performed.  COMPARISON:  None.  INDICATIONS:  fall left hip pain  PATIENT STATED HISTORY: (As transcribed by Technologist)  Patient stated falling today and has left hip pain.    FINDINGS:  Transverse fracture of left femoral neck with cephalad displacement of the distal fragment and varus angulation.  No dislocation.  Degenerative changes in visualized lower lumbar spine.            CONCLUSION:  Displaced and angulated fracture of left femoral neck.   LOCATION:  [id number]   Dictated by (CST): Arthur Temple MD on 2/03/2024 at 6:17 PM     Finalized by (CST): Arthur Temple MD on 2/03/2024 at 6:18 PM       XR KNEE (1 OR 2 VIEWS), LEFT (CPT=73560)    Result Date: 2/3/2024  PROCEDURE:  XR KNEE (1 OR 2 VIEWS), LEFT (CPT=73560)  COMPARISON:  None.  INDICATIONS:  fall left knee pain  PATIENT STATED HISTORY: (As transcribed by Technologist)  Patient stated falling today and has left anterior knee pain.    FINDINGS:  AP image is limited due to flexion.  No fracture, dislocation or osseous abnormality.            CONCLUSION:   No fracture.   LOCATION:  [facility]   Dictated by (CST): Arthur Temple MD on 2/03/2024 at 6:16 PM     Finalized by (CST): Arthru Temple MD on 2/03/2024 at 6:17 PM      Over         [facility]                                      Medical Decision Making  83-year-old woman here for evaluation after fall.  Differential includes left hip fracture left knee fracture acute intracranial hemorrhage.  CT of the head with no acute abnormality, left hip x-ray does show a femoral neck fracture.  Case discussed with [facility], she will be admitted for further evaluation and treatment.  Femoral nerve block was provided by the anesthesia service who evaluated the patient at bedside.  Case endorsed to the [facility] hospitalist who agrees with plan for admission.    Problems Addressed:  Closed fracture of left hip, initial encounter ([facility]): acute illness or injury    Amount and/or Complexity of Data Reviewed  Labs: ordered. Decision-making details documented in ED Course.  Radiology: ordered and independent interpretation performed. Decision-making details documented in ED Course.     Details: I independently viewed and interpreted the following imaging: X-ray of the left hip shows femoral neck fracture    Risk  Prescription drug management.  Parenteral controlled substances.  Decision regarding hospitalization.        Disposition and Plan     Clinical Impression:  1. Closed fracture of left hip, initial encounter ([facility])         Disposition:  There is no disposition on file for this visit.  There is no disposition time on file for this visit.    Follow-up:  No follow-up provider specified.        Medications Prescribed:  Current Discharge Medication List

## 2024-02-04 NOTE — PROGRESS NOTES
Patient received from PACU post left hip hemiarthroplasty.  Transported by bed, received asleep, drowsy, able to open eyes with pain and verbal stimuli.  Left posterior lateral hip with telfa , tegaderm dressing and ice pack.  Dressing looks clean, dry and intact.  Abductor pillow between her legs and she is on oxygen 3L/nasal cannula.  Patient made comfortable.  Son and daughter updated on post op orders.  Will monitor patient

## 2024-02-04 NOTE — ANESTHESIA POSTPROCEDURE EVALUATION
[facility]    Mar Hernandez Patient Status:  Inpatient   Age/Gender 83 year old female MRN [id number]   Location [facility] 3SW-A Attending Stiven Vicente MD   Hosp Day # 1 PCP Nydia Bauer MD       Anesthesia Post-op Note    LEFT HIP HEMIARTHROPLASTY/ BIPOLAR    Procedure Summary       Date: 02/04/24 Room / Location:  MAIN OR 08 /  MAIN OR    Anesthesia Start: 0928 Anesthesia Stop: 1130    Procedure: LEFT HIP HEMIARTHROPLASTY/ BIPOLAR (Left: Hip) Diagnosis:       Hip fracture (HCC)      (Hip fracture (HCC) [S72.009A])    Surgeons: Galo Heck MD Anesthesiologist: Cheri Jones MD    Anesthesia Type: general ASA Status: 3            Anesthesia Type: general    Vitals Value Taken Time   /87 02/04/24 1229   Temp 97.4 °F (36.3 °C) 02/04/24 1219   Pulse 72 02/04/24 1242   Resp 11 02/04/24 1242   SpO2 96 % 02/04/24 1242   Vitals shown include unfiled device data.    Patient Location: PACU    Anesthesia Type: general    Airway Patency: patent    Postop Pain Control: adequate    Mental Status: mildly sedated but able to meaningfully participate in the post-anesthesia evaluation    Nausea/Vomiting: none    Cardiopulmonary/Hydration status: stable euvolemic    Complications: no apparent anesthesia related complications    Postop vital signs: stable    Dental Exam: Unchanged from Preop    Patient to be discharged from PACU when criteria met.

## 2024-02-04 NOTE — PROGRESS NOTES
[facility]     Hospitalist Progress Note     Mar Hernandez Patient Status:  Inpatient    1940 MRN [id number]   Location [facility] 3SW-A Attending Stiven Vicente MD   Hosp Day # 1 PCP Nydia Bauer MD     Chief Complaint: fall     Subjective:   Patient resting peacefully. No fevers.     Objective:    Review of Systems:   A comprehensive review of systems was completed; pertinent positive and negatives stated in subjective.  Vital signs:  Temp:  [97.5 °F (36.4 °C)-98.3 °F (36.8 °C)] 98.1 °F (36.7 °C)  Pulse:  [68-82] 79  Resp:  [13-24] 17  BP: (101-140)/(57-85) 130/77  SpO2:  [91 %-100 %] 96 %  Physical Exam:    General: No acute distress   Respiratory: no wheezes, no rhonchi  Cardiovascular: S1, S2, RRR  Abdomen: Soft, NT/ND, +BS  Extremities: no edema, left hip post op as expected     Diagnostic Data:    Labs:  Recent Labs   Lab 24  1648 24  0628   WBC 14.9* 10.3   HGB 14.9 13.6   MCV 90.6 94.0   .0 175.0   INR 1.00  --      Recent Labs   Lab 24  1648 24  0628   * 106*   BUN 15 14   CREATSERUM 0.99 0.90   CA 9.7 9.2   ALB 3.7  --     142   K 3.8 3.9    111   CO2 26.0 28.0   ALKPHO 113  --    AST 20  --    ALT 30  --    BILT 0.4  --    TP 7.1  --      Estimated Creatinine Clearance: 42.3 mL/min (based on SCr of 0.9 mg/dL).  Recent Labs   Lab 24  1648   PTP 13.2   INR 1.00        Microbiology  No results found for this visit on 24.  Imaging: Reviewed in Epic.  Medications:    heparin  5,000 Units Subcutaneous 2 times per day    donepezil  10 mg Oral Daily    FLUoxetine  10 mg Oral Daily    levothyroxine  75 mcg Oral Before breakfast    lisinopril  20 mg Oral Daily    QUEtiapine  25 mg Oral Nightly    atorvastatin  10 mg Oral Nightly    traZODone  50 mg Oral Nightly    ondansetron           Assessment & Plan:      #Left femoral neck fracture  #Mechanical fall  -Orthopedic surgery consulted from the emergency room  -Pain control, IV  fluids  -N.p.o.     #Leukocytosis- reactive and resolved     #Atelectasis- IS. Lower suspicion for active PNA- hold abx      #Dementia  -Near baseline which is alert and oriented x 0-1  -Has history of sundowning- monitor      #Essential hypertension     #Hyperlipidemia     #Hypothyroidism     #Skin cancer     #Obesity-BMI 30.65      Stiven Vicente MD  Supplementary Documentation:     Quality:  DVT Mechanical Prophylaxis:   SCDs,    DVT Pharmacologic Prophylaxis   Medication    heparin (Porcine) 5000 UNIT/ML injection 5,000 Units                Code Status: DNAR/Selective Treatment  Ott: External urinary catheter in place  Ott Duration (in days):   Central line:    DELICIA:   Discharge is dependent on: progress  At this point Ms. Hernandez is expected to be discharge to: tbd   **Certification      PHYSICIAN Certification of Need for Inpatient Hospitalization - Initial Certification    Patient will require inpatient services that will reasonably be expected to span two midnight's based on the clinical documentation in H+P.   Based on patients current state of illness, I anticipate that, after discharge, patient will require TBD.    The 21st Century Cures Act makes medical notes like these available to patients in the interest of transparency. Please be advised this is a medical document. Medical documents are intended to carry relevant information, facts as evident, and the clinical opinion of the practitioner. The medical note is intended as peer to peer communication and may appear blunt or direct. It is written in medical language and may contain abbreviations or verbiage that are unfamiliar.

## 2024-02-04 NOTE — H&P
[facility]     Mar Hernandez Patient Status:  Emergency    1940 MRN [id number]   [facility] EMERGENCY DEPARTMENT Attending Albert Tracy MD   Hosp Day # 0 PCP Nydia Bauer MD     Chief Complaint: Left hip pain after mechanical fall    Subjective:    History of Present Illness:     Mar Hernandez is a 83 year old female with Past medical history essential hypertension, hyperlipidemia, hypothyroidism, dementia (baseline alert and oriented x 0-1), presents to the hospital today after a mechanical fall causing hip pain.  Patient lives at a nursing facility and I was met with patient's son and daughter-in-law at the bedside.  They were not with their loved one at the facility but per chart review, speaking to staff and from family patient was otherwise in her normal state of health when she tripped and fell on her left hip.  Pain has been having pain and came into the hospital further evaluation.  Patient otherwise denies any chest pain, shortness of breath, fevers, chills, nausea, vomiting, diarrhea.          History/Other:    Past Medical History:  Past Medical History:   Diagnosis Date    Contusion of scalp 5/10/2022    COVID-19 virus infection 2023    Head injury 5/10/2022    S/P hysterectomy late 80's     Past Surgical History:   Past Surgical History:   Procedure Laterality Date    HYSTERECTOMY      SKIN SURGERY Left 2016    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    SKIN SURGERY Left 2016    ED&C of SCCIS to L Upper Arm by SD    SKIN SURGERY Left 2016    ED&C of SCCIS to L Upper Back by SD      Family History:   Family History   Problem Relation Age of Onset    Other (Other) Father         Aortic Aneurysm    Hypertension Brother      Social History:    reports that she has never smoked. She has never used smokeless tobacco. She reports current alcohol use of about 1.0 standard drink of alcohol per week. She reports that she does not  use drugs.     Allergies: No Known Allergies    Medications:    No current facility-administered medications on file prior to encounter.     Current Outpatient Medications on File Prior to Encounter   Medication Sig Dispense Refill    QUEtiapine (SEROQUEL) 25 MG Oral Tab Take 1 tablet (25 mg total) by mouth nightly.      traZODone 50 MG Oral Tab Take 1 tablet (50 mg total) by mouth nightly.      lisinopril 20 MG Oral Tab Take 1 tablet (20 mg total) by mouth daily. 90 tablet 3    FLUoxetine (PROZAC) 10 MG Oral Cap Take 1 capsule (10 mg total) by mouth daily. 90 capsule 3    fexofenadine 180 MG Oral Tab Take 1 tablet (180 mg total) by mouth daily. One tab po q am prn sinus headache or PND. 30 tablet 0    Ipratropium Bromide 0.06 % Nasal Solution 2 sprays by Nasal route 4 (four) times daily. 1 each 0    guaiFENesin  MG Oral Tablet 12 Hr Take 2 tablets (1,200 mg total) by mouth 2 (two) times daily. 60 tablet 5    Nystatin 799805 UNIT/GM External Powder Apply under both breast 3 times daily x30 days 60 g 1    MEMANTINE HCL 10 MG Oral Tab TAKE 1 TAB BY MOUTH TWICE DAILY 90 tablet 3    LEVOTHYROXINE SODIUM 75 MCG Oral Tab TAKE 1 TAB BY MOUTH EVERY MORNING BEFORE BREAKFAST 90 tablet 0    CALCIUM + VITAMIN D3 600-10 MG-MCG Oral Tab TAKE 1 TAB BY MOUTH TWICE DAILY 90 tablet 3    VITAMIN B-12 250 MCG Oral Tab TAKE 1 TAB BY MOUTH DAILY 90 tablet 3    simvastatin 20 MG Oral Tab Take 1 tablet (20 mg total) by mouth nightly. 90 tablet 2    Donepezil HCl 10 MG Oral Tab Take 1 tablet (10 mg total) by mouth daily. 90 tablet 3    Multiple Vitamins-Minerals (PRESERVISION AREDS) Oral Tab Take 1 capsule by mouth 2 (two) times daily. 180 tablet 3    acetaminophen 500 MG Oral Tab Take 1 tablet (500 mg total) by mouth every 6 (six) hours as needed for Pain. 90 tablet 3       Review of Systems:   A comprehensive review of systems was completed.    Pertinent positives and negatives noted in the HPI.    Objective:   Physical Exam:    BP  129/64   Pulse 72   Temp 98.2 °F (36.8 °C) (Temporal)   Resp 17   Ht 5' 4\" (1.626 m)   Wt 178 lb 9.2 oz (81 kg)   SpO2 97%   BMI 30.65 kg/m²   General: No acute distress, Alert  Respiratory: No rhonchi, no wheezes  Cardiovascular: S1, S2. Regular rate and rhythm  Abdomen: Soft, Non-tender, non-distended, positive bowel sounds  Neuro: No new focal deficits  Extremities: No edema      Results:    Labs:      Labs Last 24 Hours:    Recent Labs   Lab 02/03/24  1648   RBC 4.98   HGB 14.9   HCT 45.1   MCV 90.6   MCH 29.9   MCHC 33.0   RDW 12.3   NEPRELIM 12.49*   WBC 14.9*   .0       Recent Labs   Lab 02/03/24  1648   *   BUN 15   CREATSERUM 0.99   EGFRCR 57*   CA 9.7   ALB 3.7      K 3.8      CO2 26.0   ALKPHO 113   AST 20   ALT 30   BILT 0.4   TP 7.1       Lab Results   Component Value Date    INR 1.00 02/03/2024       No results for input(s): \"TROP\", \"TROPHS\", \"CK\" in the last 168 hours.    No results for input(s): \"TROP\", \"PBNP\" in the last 168 hours.    Recent Labs   Lab 02/03/24  1648   PCT <0.05       Imaging: Imaging data reviewed in Epic.    Assessment & Plan:      #Left femoral neck fracture  #Mechanical fall  -Orthopedic surgery consulted from the emergency room  -Pain control, IV fluids  -N.p.o.    #Leukocytosis  -Likely secondary to above, likely reactive  -Repeat labs in a.m.    #Dementia  -Near baseline which is alert and oriented x 0-1  -Has history of sundowning    #Essential hypertension    #Hyperlipidemia    #Hypothyroidism    #Skin cancer    #Obesity  -BMI 30.65    #ACP  -DNR, 16 minutes spent face-to-face with patient and patient's son at the bedside.  This was a voluntary conversation.  We reviewed the meaning of cardiac arrest and the use of a/CPR.  Patient would not want these measures.  Order updated on epic.        Plan of care discussed with ER physician, patient, patient's family    Niket Chaudhry, DO    Supplementary Documentation:     The 21st Century Cures Act  makes medical notes like these available to patients in the interest of transparency. Please be advised this is a medical document. Medical documents are intended to carry relevant information, facts as evident, and the clinical opinion of the practitioner. The medical note is intended as peer to peer communication and may appear blunt or direct. It is written in medical language and may contain abbreviations or verbiage that are unfamiliar.

## 2024-02-04 NOTE — OPERATIVE REPORT
[facility] OPERATIVE RECORD  ATTENDING SURGEON: JAZMINE SANCHEZ MD  ASSISTANT(S): Sachin Foss SA  PRELIMINARY DIAGNOSIS:  1.  Left displaced, transcervical femoral Neck Fracture     POSTOPERATIVE DIAGNOSIS:  1.  Left displaced, transcervical femoral Neck Fracture    THE OPERATION:  1.  Left hip hemiarthroplasty (85647)      ANESTHESIA: General Endotracheal  ANESTHESIOLOGIST: MD Robert  ANTIBIOTICS: Cefazolin 2g within 60 minutes of surgical incision.    IMPLANTS:   Implant Name Type Inv. Item Serial No.  Lot No. LRB No. Used Action   AVENIR CMPL HA HO COL SZ 4 - SN/A  AVENIR CMPL HA HO COL SZ 4 N/A [employer]  [id number] Left 1 Implanted   SHELL ACET [id number] HIP TIV ZMLY BPLR RITA - SN/A  SHELL ACET [id number] HIP TIV ZMLY BPLR RITA N/A [employer]  [id number] Left 1 Implanted   LINER ACET OD47/48/49MM [id number] HIP POLY BPLR - SN/A  LINER ACET OD47/48/49MM [id number] HIP POLY BPLR N/A [employer]  [id number] Left 1 Implanted   HEAD FEM [id number] NK L+3.5MM TAPR 12/14 UNIV - SN/A  HEAD FEM [id number] NK L+3.5MM TAPR 12/14 UNIV N/A [employer]  [id number] Left 1 Implanted     PATHOLOGY/CULTURES: None  ESTIMATED BLOOD LOSS: Blood Output: 200 mL (2/4/2024 11:02 AM)    DRAINS: None  COMPLICATIONS: No intraoperative nor immediate postoperative complications noted.  COUNTS: All sponge and needle counts were correct at the conclusion of the procedure.  OPERATIVE FINDINGS:  Overall, well-maintained bone quality and adequate uncemented fixation and appropriate anteversion with bipolar hemiarthroplasty.  Restoration of limb length discrepancy.  Stable hip to abduction external rotation, position of sleep, 90 degrees flexion and internal rotation to 60 degrees.     Indications:  Mar is a 83 year old female with history, physical examination, and imaging findings consistent with the displaced femoral neck fracture.  Operative and nonoperative options were discussed with her and we agreed that surgery would  provide the highest likelihood of symptomatic relief and functional improvement.  The risks and benefits of surgery as well as the postoperative rehabilitation plan were reviewed. She voiced a good understanding of treatment options, risks and benefits, and alternatives to surgery. She was given the opportunity to ask questions, which were all answered to the best of my ability and to her satisfaction. Mar wished to proceed.   On the day of surgery, the Mar was seen in the preoperative area.  I confirmed her identity by name and birthday. We reviewed and confirmed the surgical consent including laterality.  The surgical site was marked with my initials.  We re-reviewed the risks and benefits of the procedure and I answered all additional questions to her satisfaction.      OPERATIVE REPORT:   Mar was taken to the operating room in stable condition.    Following adequate anesthesia and administration of preoperative antibiotics and tranexamic acid of 1 g IV.  Patient was positioned in a lateral decubitus position on a pegboard with a gel overlying.  4 pegs were placed 1 at the most prominent aspect of the sacrum, 1 at the inferior border of the scapula posteriorly.  Anteriorly there was a PEG placed at the level of the xiphoid, and at the level of the anterior superior iliac spine.  The patient was noted to be stable in this lateral decubitus position all bony prominences were padded.  Notably the gel was folded overlying the peroneal nerve and also this facilitated use of intraoperative leg length measurement.  The contralateral leg was not secured down as this was stable with the weight of the leg and a 1 pillow was placed in between.  A large clear U drape was placed and the patient underwent a prescrub and prep with chlorhexidine scrub brushes and alcohol followed by chlorhexidine skin prep.  A candycane type of leg al was utilized and the foot was nonsterile with the aid of an assistant additionally  holding the leg.  Prep and drape was performed with 2 down sheets and 2 splits.  There was crosshatch marks on the skin placed and then Ioban circumferentially.  A impervious stockinette was placed on the foot wrapped by Coban to ensure that all layers were covered.     A standard timeout was performed confirming the patient, procedure, and team.  I began with a 10 blade incision through skin and subcutaneous tissue, Bovie electrocautery was used to dissect down to the level of the iliotibial band.  Self-retaining retractors were placed.  A fresh 10 blade was used to incise through the iliotibial band and a curved Sanders scissor was used to extend this distally and proximally entering into the fibers of the gluteus vikram.  This was carefully divided proximally by finger dissection.  The Charnley hip retractor was placed deep to the iliotibial band and a thorough bursectomy was performed.  At this time a Cobra retractor was placed deep to the gluteus medius, and a Rodriguez retractor was used to dissect the gluteus minimus off of the capsule.  At this point a Cobra retractor was placed deep to the gluteus minimus and capsule was visualized.  Using an L-shaped type of capsulotomy taking care to avoid damaging the hip labrum, Bovie electrocautery was used to elevate the short external rotators and capsule and one sleeve and a #2 Fiberwire suture was used as a tagging stitch.     This allowed for visualization of the fracture site and the limb was positioned in flexion and internal rotation position to dislocate the femoral neck.  A reciprocating single-sided saw was used to then perform a neck cut by carefully visualizing the lesser trochanter.  Notably prior to this step the Charnley retractor was removed and a Aby retractor was placed for adequate visualization.  In the process of performing the neck cut it was important to know the angle of the greater trochanter to perform adequate resection.  A counter cut was then  performed and the remaining femoral neck was removed.  This was approximately 8-10 mm above the lesser trochanter as templated.     We then used a corkscrew on hand to extract the femoral head from the acetabulum and noted to observe preserved cartilage and labrum without need for acetabular resurfacing.  I then proceeded to use a box osteotome to remove and start a starting site into the canal.  A lateralizing reamer on reamer was utilized to extract any extra bone that would interfere with adequate entry into the canal and then we sequentially broached to a size 4.  At this time we trialed with a unipolar head and measured within the acetabulum a size 47 head with a trial which was noted to be completely down and as such compared to and measured limb lengths.  We then reintroduced the trial, including a 47 mm bipolar head which allowed us to further restore stability and limb length discrepancy.  Extended offset +3.5. As such a final bipolar stem was placed ensuring that the surface of the metallic stem was cleaned prior to introducing.  Limb lengths, stability to flexion, internal rotation, abduction and external rotation was all confirmed.  Patient was deemed to be stable.     Closure was performed with #5 Fiberwire including closure of the posterior hip capsule L-shaped capsulotomy through bone tunnels.  And then in layers with #1 Vicryl pops, 0 Vicryl, 2-0 Monocryl, 3-0 Monocryl in running subcuticular fashion with buried knots.  Dermabond and Steri-Strips.  A  padded tegaderm dressing was placed.     The patient was placed into a hip abduction pillow and tolerated the procedure very well and taken to the PACU without any complications.       POST OPERATIVE PLAN:  Activity Precautions: Weightbearing as tolerated, posterior hip precautions  DVT Prophylaxis: Aspirin 81 mg twice daily  Follow-up Visit: 3 weeks post surgery, Sincer Abram Heck MD  Knee, Shoulder, & Elbow Surgery / Sports  Medicine Specialist  Orthopaedic Surgery  [address] [place] [id number]   [email or web address]  [email or web address]  t: [phone]  o: [phone]  f: [phone]    This note was dictated using Dragon software.  While it was briefly proofread prior to completion, some grammatical, spelling, and word choice errors due to dictation may still occur.

## 2024-02-04 NOTE — PHYSICAL THERAPY NOTE
Orders received and chart review completed. Per EMR, Pt was admitted with fall and found to have left femoral neck fracture. Per ortho note, planning for surgical intervention today. Will need new orders post-op with updated weight-bearing status and any relevant precautions.

## 2024-02-04 NOTE — CONSULTS
Orthopaedic Surgery   [address] [place] [id number]  [phone]     INPATIENT CONSULTATION - HISTORY AND PHYSICAL EXAMINATION     Name: Mar Hernandez   MRN: [id number]  Date: 2/3/2024     CC: Left Proximal Femur Fracture (trans-cervical femoral neck)    HPI:   Mar Hernandez is a very pleasant 83 year old female with Past medical history of hypertension, hyperlipidemia, hypothyroidism, dementia (baseline alert and oriented x 0-1), presents to the hospital today after a mechanical fall causing hip pain.  Patient lives at a nursing facility and I had the opportunity to discuss more with the son of the patient. He is additionally the DPOA.     Patient was previously ambulatory without assistive devices.       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    MEDICATIONS:   No current outpatient medications on file.       ROS: A comprehensive 14 point review of systems was performed and was negative aside from the aforementioned per history of present illness.    SOCIAL HX:  Social History     Tobacco Use    Smoking status: Never    Smokeless tobacco: Never   Substance Use Topics    Alcohol use: Yes     Alcohol/week: 1.0 standard drink of alcohol     Types: 1 Glasses of wine per week     Comment: 1-2 per week       PE:   Vitals:    02/03/24 1900 02/03/24 1917 02/03/24 1918 02/03/24 2037   BP:  133/74 133/72 129/64 101/57   Pulse: 69 68 72 77   Resp: 18 18 17 16   Temp:       TempSrc:       SpO2: 91% 97% 97%    Weight:       Height:         Estimated body mass index is 30.65 kg/m² as calculated from the following:    Height as of this encounter: 5' 4\" (1.626 m).    Weight as of this encounter: 178 lb 9.2 oz (81 kg).    Physical Exam  Constitutional:       Appearance: Normal appearance. Mild to moderate discomfort  HENT:      Head: Normocephalic and atraumatic.   Eyes:      Extraocular Movements: Extraocular movements intact.   Neck:      Musculoskeletal: Normal range of motion and neck supple.   Cardiovascular:      Pulses: Normal pulses.   Pulmonary:      Effort: Pulmonary effort is normal. No respiratory distress.   Abdominal:      General: There is no distension.   Skin:     General: Skin is warm.      Capillary Refill: Capillary refill takes less than 2 seconds.      Findings: No bruising.   Neurological:      General: No focal deficit present.      Mental Status: Alert.   Psychiatric:         Mood and Affect: Mood normal.     Examination of the left hip demonstrates:     Skin is intact, warm and dry.  No cutaneous abrasions or lacerations overlying the affected hip.  Patient is able to demonstrate ankle, toe range of motion distally.  Significant pain with any attempted mobility.  No pain with palpation and mobilization of the ipsilateral knee.      No obvious peripheral edema noted.   Distal neurovascular exam demonstrates normal perfusion, intact sensation to light touch and full strength.     Examination of the contralateral lower extremity demonstrates no significant atrophy, swelling, or effusion.  Full range of motion and neurovascularly intact distally.    Radiographic Examination/Diagnostics: Hip radiographs personally viewed, independently interpreted and radiology report was reviewed.    CT BRAIN OR HEAD (48495)    Result Date: 2/3/2024  PROCEDURE:  CT BRAIN OR HEAD (62460)  COMPARISON:   [address], MR, MRI BRAIN (CPT=70551), 11/18/2016, 8:38 AM.  INDICATIONS:  fall  TECHNIQUE:  Noncontrast CT scanning is performed through the brain. Dose reduction techniques were used. Dose information is transmitted to the ACR (American College of Radiology) NRDR (National Radiology Data Registry) which includes the Dose Index Registry.  PATIENT STATED HISTORY: (As transcribed by Technologist)  Patient had fall with confusion and weakness.    FINDINGS:  VENTRICLES/SULCI:  Moderate symmetric enlargement of ventricular system and cortical sulci.  No extra-axial fluid collection or midline shift. INTRACRANIAL:  No intracranial mass or hemorrhage.  No sign of acute territorial infarction.  Symmetric low attenuation of cerebral white matter consistent with chronic small vessel ischemic changes. SINUSES:           No sign of acute sinusitis.  MASTOIDS:          No sign of acute inflammation. SKULL:             No evidence for fracture or osseous abnormality. OTHER:             Markedly enlarged right mandibular condyle with sclerosis and remodeling of the temporomandibular joint is unchanged from MRI of 11/18/2016.            CONCLUSION:  1. No acute intracranial pathology. 2. Global atrophy and chronic small vessel ischemic changes of the cerebral white matter.    LOCATION:  [id number]   Dictated by (CST): Arthur Temple MD on 2/03/2024 at 6:54 PM     Finalized by (CST): Arthur Temple MD on 2/03/2024 at 6:58 PM       XR CHEST AP PORTABLE  (CPT=71045)    Result Date: 2/3/2024  PROCEDURE:  XR CHEST AP PORTABLE  (CPT=71045)  TECHNIQUE:  AP chest radiograph was obtained.  COMPARISON:  None.  INDICATIONS:  hip frx  PATIENT STATED HISTORY: (As transcribed by Technologist)  Patient stated falling today and has left hip pain.    FINDINGS:  Normal heart size and pulmonary vascularity.  Mildly tortuous aorta.  Angular retrocardiac opacity.            CONCLUSION:  Left basilar atelectasis versus pneumonia.   LOCATION:  [id number]       Dictated by (CST): Arthur Temple MD on 2/03/2024 at 6:18 PM     Finalized by (CST): Arthur Temple MD on 2/03/2024 at 6:19 PM       XR HIP W OR WO PELVIS 2 OR 3 VIEWS, LEFT (CPT=73502)    Result Date: 2/3/2024  PROCEDURE:  XR HIP W OR WO PELVIS 2 OR 3 VIEWS, LEFT (CPT=73502)  TECHNIQUE:  Unilateral 2 to 3 views of the hip and pelvis if performed.  COMPARISON:  None.  INDICATIONS:  fall left hip pain  PATIENT STATED HISTORY: (As transcribed by Technologist)  Patient stated falling today and has left hip pain.    FINDINGS:  Transverse fracture of left femoral neck with cephalad displacement of the distal fragment and varus angulation.  No dislocation.  Degenerative changes in visualized lower lumbar spine.            CONCLUSION:  Displaced and angulated fracture of left femoral neck.   LOCATION:  [id number]   Dictated by (CST): Arthur Temple MD on 2/03/2024 at 6:17 PM     Finalized by (CST): Arthur Temple MD on 2/03/2024 at 6:18 PM       XR KNEE (1 OR 2 VIEWS), LEFT (CPT=73560)    Result Date: 2/3/2024  PROCEDURE:  XR KNEE (1 OR 2 VIEWS), LEFT (CPT=73560)  COMPARISON:  None.  INDICATIONS:  fall left knee pain  PATIENT STATED HISTORY: (As transcribed by Technologist)  Patient stated falling today and has left anterior knee pain.    FINDINGS:  AP image is limited due to flexion.  No fracture, dislocation or osseous abnormality.            CONCLUSION:  No fracture.   LOCATION:  [id number]   Dictated by (CST): Arthur Temple MD on 2/03/2024 at 6:16 PM     Finalized by (CST): Arthur Temple MD on 2/03/2024 at 6:17 PM         IMPRESSION: Mar Hernandez is a 83 year old female with Left Proximal Femur Fracture that merits operative fixation to facilitate mobility and ambulation. The trans-cervical femoral neck component of this fracture merits hemiarthroplasty as a definitive measure. Due to age and concern for osteoporosis, cement will be utilized.     PLAN:   We had a detailed discussion outlining the anatomy and overall mechanism  of injury resulting in fracture pattern.  I briefly reviewed the imaging of the fracture with the patient as well as family members.    I discussed in detail the need for timely fracture stabilization to allow for mobility and recovery. I had a lengthy discussion with Mar's family about the diagnosis and options. I have recommended that we proceed with surgical treatment as we agree surgical intervention would likely offer the best opportunity for symptomatic relief and functional recovery. We reviewed the risks associated with the proposed procedure.  In particular we discussed risks that include, but are not limited to infection, blood loss, potential transient or permanent injury to nerves or blood vessels, joint stiffness, persistent pain, need for future operation, failure of healing, wound complications, failure of therapeutic intervention, risk of re-injury, fracture, fixation failure, deep vein thrombosis and pulmonary embolism. We discussed the proposed rehabilitation timeline as well as expected postoperative restrictions. Mar's son voiced a good understanding of treatment options, risks and benefits, postoperative instructions, rehabilitation timeline, and restrictions. She was given the opportunity to ask questions, which were all answered to the best of my ability and to her satisfaction.  As such informed consent was obtained from the son / DPOA.   I spent 60 minutes in preparation to see the patient, counseling/education of relevant pathology, discussing imaging results, surgical counseling, care coordination, and documentation into the electronic medical record.    FOLLOW-UP:  Patient will follow up in our clinic approximately 3 weeks post surgical intervention for repeat clinical assessment.        Galo Heck MD  Knee, Shoulder, & Elbow Surgery / Sports Medicine Specialist  Orthopaedic Surgery  [address] [place] [id number]   [email or web address]  [email or web address]  t:  [phone]  o: [phone]  f: [phone]    This note was dictated using Dragon software.  While it was briefly proofread prior to completion, some grammatical, spelling, and word choice errors due to dictation may still occur.

## 2024-02-04 NOTE — OCCUPATIONAL THERAPY NOTE
Occupational Therapy    Orders received and chart review completed. Per EMR, Pt was admitted with fall and found to have left femoral neck fracture. Per ortho note, planning for surgical intervention today. Will need new orders post-op with updated weight-bearing status and any relevant precautions. Thanks!

## 2024-02-04 NOTE — ANESTHESIA PREPROCEDURE EVALUATION
PRE-OP EVALUATION    Patient Name: Mar Hernandez    Admit Diagnosis: Closed fracture of left hip, initial encounter ([facility]) [S72.002A]    Pre-op Diagnosis: Hip fracture ([facility]) [S72.009A]    LEFT HIP HEMIARTHROPLASTY/ BIPOLAR    Anesthesia Procedure: LEFT HIP HEMIARTHROPLASTY/ BIPOLAR (Left)    Surgeon(s) and Role:     * Galo Heck MD - Primary    Pre-op vitals reviewed.  Temp: 98.1 °F (36.7 °C)  Pulse: 79  Resp: 17  BP: 130/77  SpO2: 96 %  Body mass index is 30.55 kg/m².    Current medications reviewed.  Hospital Medications:   [COMPLETED] fentaNYL (Sublimaze) 50 mcg/mL injection 50 mcg  50 mcg Intravenous Once    [MAR Hold] lidocaine PF (Xylocaine-MPF) 1% injection  2 mL Injection PRN    [MAR Hold] ropivacaine (Naropin) 0.5% injection  30 mL Injection PRN    [MAR Hold] EPINEPHrine (Adrenalin) 1 MG/ML injection (Allergic Reaction Kit) 1 mg  1 mg Injection PRN    [MAR Hold] sodium chloride 0.9% PF injection 10 mL  10 mL Injection PRN    [MAR Hold] acetaminophen (Tylenol Extra Strength) tab 500 mg  500 mg Oral Q4H PRN    [MAR Hold] melatonin tab 3 mg  3 mg Oral Nightly PRN    [MAR Hold] ondansetron (Zofran) 4 MG/2ML injection 4 mg  4 mg Intravenous Q6H PRN    [MAR Hold] metoclopramide (Reglan) 5 mg/mL injection 5 mg  5 mg Intravenous Q8H PRN    dextrose 5%-sodium chloride 0.45% infusion   Intravenous Continuous    [MAR Hold] heparin (Porcine) 5000 UNIT/ML injection 5,000 Units  5,000 Units Subcutaneous 2 times per day    [MAR Hold] polyethylene glycol (PEG 3350) (Miralax) 17 g oral packet 17 g  17 g Oral Daily PRN    [MAR Hold] sennosides (Senokot) tab 17.2 mg  17.2 mg Oral Nightly PRN    [MAR Hold] bisacodyl (Dulcolax) 10 MG rectal suppository 10 mg  10 mg Rectal Daily PRN    [MAR Hold] fleet enema (Fleet) 7-19 GM/118ML rectal enema 133 mL  1 enema Rectal Once PRN    [MAR Hold] morphINE PF 2 MG/ML injection 1 mg  1 mg Intravenous Q2H PRN    Or    [MAR Hold] morphINE PF 2 MG/ML injection 2 mg  2 mg Intravenous  Q2H PRN    Or    [MAR Hold] morphINE PF 4 MG/ML injection 4 mg  4 mg Intravenous Q2H PRN    [MAR Hold] donepezil (Aricept) tab 10 mg  10 mg Oral Daily    [MAR Hold] FLUoxetine (PROzac) tab 10 mg  10 mg Oral Daily    [MAR Hold] levothyroxine (Synthroid) tab 75 mcg  75 mcg Oral Before breakfast    [MAR Hold] lisinopril (Prinivil; Zestril) tab 20 mg  20 mg Oral Daily    [MAR Hold] QUEtiapine (SEROquel) tab 25 mg  25 mg Oral Nightly    [MAR Hold] atorvastatin (Lipitor) tab 10 mg  10 mg Oral Nightly    [MAR Hold] traZODone (Desyrel) tab 50 mg  50 mg Oral Nightly    [] ondansetron (Zofran) 4 MG/2ML injection           Outpatient Medications:     Medications Prior to Admission   Medication Sig Dispense Refill Last Dose    QUEtiapine (SEROQUEL) 25 MG Oral Tab Take 1 tablet (25 mg total) by mouth nightly.   2024    traZODone 50 MG Oral Tab Take 1 tablet (50 mg total) by mouth nightly.   2024    lisinopril 20 MG Oral Tab Take 1 tablet (20 mg total) by mouth daily. 90 tablet 3 2/3/2024    FLUoxetine (PROZAC) 10 MG Oral Cap Take 1 capsule (10 mg total) by mouth daily. 90 capsule 3 2/3/2024    fexofenadine 180 MG Oral Tab Take 1 tablet (180 mg total) by mouth daily. One tab po q am prn sinus headache or PND. 30 tablet 0 2/3/2024    Ipratropium Bromide 0.06 % Nasal Solution 2 sprays by Nasal route 4 (four) times daily. 1 each 0 2/3/2024    guaiFENesin  MG Oral Tablet 12 Hr Take 2 tablets (1,200 mg total) by mouth 2 (two) times daily. 60 tablet 5 2/3/2024    Nystatin 949427 UNIT/GM External Powder Apply under both breast 3 times daily x30 days 60 g 1 2/3/2024    MEMANTINE HCL 10 MG Oral Tab TAKE 1 TAB BY MOUTH TWICE DAILY 90 tablet 3 2/3/2024    LEVOTHYROXINE SODIUM 75 MCG Oral Tab TAKE 1 TAB BY MOUTH EVERY MORNING BEFORE BREAKFAST 90 tablet 0 2/3/2024    VITAMIN B-12 250 MCG Oral Tab TAKE 1 TAB BY MOUTH DAILY (Patient taking differently: Take 2 tablets (500 mcg total) by mouth daily.) 90 tablet 3 2/3/2024     simvastatin 20 MG Oral Tab Take 1 tablet (20 mg total) by mouth nightly. 90 tablet 2 2/2/2024    Donepezil HCl 10 MG Oral Tab Take 1 tablet (10 mg total) by mouth daily. 90 tablet 3 2/3/2024    Multiple Vitamins-Minerals (PRESERVISION AREDS) Oral Tab Take 1 capsule by mouth 2 (two) times daily. 180 tablet 3 2/3/2024    acetaminophen 500 MG Oral Tab Take 1 tablet (500 mg total) by mouth every 6 (six) hours as needed for Pain. 90 tablet 3 Past Week    CALCIUM + VITAMIN D3 600-10 MG-MCG Oral Tab TAKE 1 TAB BY MOUTH TWICE DAILY 90 tablet 3        Allergies: Patient has no known allergies.      Anesthesia Evaluation        Anesthetic Complications  (-) history of anesthetic complications         GI/Hepatic/Renal    Negative GI/hepatic/renal ROS.                             Cardiovascular              Unable to assess MET.  (-) obesity  (+) hypertension   (+) hyperlipidemia                                  Endo/Other           (+) hypothyroidism                       Pulmonary    Negative pulmonary ROS.                       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    SKIN SURGERY Left 04/27/2016    ED&C of SCCIS to L Upper Arm by SD    SKIN SURGERY Left 04/27/2016    ED&C of SCCIS to L Upper Back by SD     Social History     Socioeconomic History    Marital status:    Tobacco Use    Smoking status: Never    Smokeless tobacco: Never   Substance and Sexual Activity    Alcohol use: Yes      Alcohol/week: 1.0 standard drink of alcohol     Types: 1 Glasses of wine per week     Comment: 1-2 per week    Drug use: No   Other Topics Concern    Caffeine Concern Yes     Comment: 1-2 per day    Exercise No    Seat Belt Yes     History   Drug Use No     Available pre-op labs reviewed.  Lab Results   Component Value Date    WBC 10.3 02/04/2024    RBC 4.52 02/04/2024    HGB 13.6 02/04/2024    HCT 42.5 02/04/2024    MCV 94.0 02/04/2024    MCH 30.1 02/04/2024    MCHC 32.0 02/04/2024    RDW 12.6 02/04/2024    .0 02/04/2024     Lab Results   Component Value Date     02/04/2024    K 3.9 02/04/2024     02/04/2024    CO2 28.0 02/04/2024    BUN 14 02/04/2024    CREATSERUM 0.90 02/04/2024     (H) 02/04/2024    CA 9.2 02/04/2024     Lab Results   Component Value Date    INR 1.00 02/03/2024         Airway      Mallampati: III  Mouth opening: <3 FB  TM distance: < 4 cm  Neck ROM: limited Cardiovascular    Cardiovascular exam normal.         Dental  Comment: No dental anomalies readily apparent from bedside exam except what is diagrammed below. However, cannot exclude evolving dental disease or vulnerability given limitations of exam. Will reassess post induction and prior to any airway manipulation.                Pulmonary    Pulmonary exam normal.                 Other findings              ASA: 3   Plan: general  NPO status verified and       Surgeon requests: regional block  Comment: I discussed the rare but serious complications of anesthesia, including but not limited to cardiovascular complications, allergic reaction to medications, pulomary/respiratory complications, post-operative nausea, post-op pain, damage to dentition, aspiration, or sore throat. The patient expressed understanding of plan and agreement.     Plan/risks discussed with: healthcare power of  and patient                Present on Admission:  **None**

## 2024-02-04 NOTE — PROGRESS NOTES
NURSING ADMISSION NOTE      Patient admitted with family at bedside  Oriented to room.  Safety precautions initiated.  Bed in low position.  Call light in reach.

## 2024-02-04 NOTE — PLAN OF CARE
Pt resting comfortably at this time, given pain meds as requesting see mar. Pt A&Ox1 as per hx. Family at bedside. Ivf infusing as ordered. Vss at t hist anam. Pt reoriented frequently. Plan npo at midnight, surg planned for the  morning. Pt & family aware. Call light within reach. Bed alarm in place.   Hip fx binder given to family at bedside.

## 2024-02-04 NOTE — ANESTHESIA PROCEDURE NOTES
Regional Block    Date/Time: 2/3/2024 7:19 PM    Performed by: Scooter Flores MD  Authorized by: Scooter Flores MD      General Information and Staff    Start Time:  2/3/2024 7:19 PM  End Time:  2/3/2024 7:25 PM  Anesthesiologist:  Scooter Flores MD  Performed by:  Anesthesiologist  Patient Location:  ED      Site Identification: real time ultrasound guided, nerve stimulator and image stored and retrievable    Block site/laterality marked before start: site marked  Reason for Block: procedure for pain and at request of ED Physician    Preanesthetic Checklist: 2 patient identifers, IV checked, site marked, risks and benefits discussed, monitors and equipment checked, pre-op evaluation, timeout performed, anesthesia consent, sterile technique used, no prohibitive neurological deficits and no local skin infection at insertion site      Procedure Details    Patient Position:  Supine  Prep: ChloraPrep    Monitoring:  Cardiac monitor, continuous pulse ox and blood pressure cuff  Block Type:  Femoral  Laterality:  Left  Injection Technique:  Single-shot    Needle    Needle Type:  Short-bevel and echogenic  Needle Gauge:  21 G  Needle Length:  100 mm  Needle Localization:  Nerve stimulator  Reason for Ultrasound Use: appropriate spread of the medication was noted in real time and no ultrasound evidence of intravascular and/or intraneural injection    Nerve Stimulator: 0.8 amps  Muscle Twitch Response: patellar snap      Assessment    Injection Assessment:  Good spread noted, negative aspiration for heme, negative resistance, no pain on injection, incremental injection, local visualized surrounding nerve on ultrasound and low pressure  Heart Rate Change: No    - Patient tolerated block procedure well without evidence of immediate block related complications.     Medications  2/3/2024 7:19 PM      Additional Comments    Medication:  Ropivacaine 0.375% 20mL    Block perfromed for L hip fracture

## 2024-02-04 NOTE — ED QUICK NOTES
Orders for admission, patient is aware of plan and ready to go upstairs. Any questions, please call ED RN tarah at extension [phone]    Patient Covid vaccination status: Fully vaccinated     COVID Test Ordered in ED: Rapid SARS-CoV-2 by PCR    COVID Suspicion at Admission: N/A    Running Infusions:  None    Mental Status/LOC at time of transport: oriented x1    Other pertinent information: na  CIWA score: N/A   NIH score:  N/A

## 2024-02-05 PROBLEM — R11.2 NAUSEA AND VOMITING: Status: ACTIVE | Noted: 2024-02-05

## 2024-02-05 LAB
HCT VFR BLD AUTO: 36.3 %
HGB BLD-MCNC: 12.1 G/DL

## 2024-02-05 PROCEDURE — 99232 SBSQ HOSP IP/OBS MODERATE 35: CPT | Performed by: HOSPITALIST

## 2024-02-05 RX ORDER — TRAMADOL HYDROCHLORIDE 50 MG/1
50 TABLET ORAL EVERY 6 HOURS PRN
Status: DISCONTINUED | OUTPATIENT
Start: 2024-02-05 | End: 2024-02-05

## 2024-02-05 RX ORDER — ACETAMINOPHEN AND CODEINE PHOSPHATE 300; 30 MG/1; MG/1
1 TABLET ORAL EVERY 4 HOURS PRN
Status: DISCONTINUED | OUTPATIENT
Start: 2024-02-05 | End: 2024-02-09

## 2024-02-05 NOTE — OCCUPATIONAL THERAPY NOTE
OCCUPATIONAL THERAPY EVALUATION - INPATIENT     Room Number: 373/373-A  Evaluation Date: 2/5/2024  Type of Evaluation: Initial  Presenting Problem: L hip fx, s/p L hip bipolar hemiarthroplasty 2/4/24    Physician Order: IP Consult to Occupational Therapy  Reason for Therapy: ADL/IADL Dysfunction and Discharge Planning    History: Patient is a 83 year old female admitted on 2/3/2024 with Presenting Problem: L hip fx, s/p L hip bipolar hemiarthroplasty 2/4/24. Brain CT, L knee xray negative. Chest xray shows L atelectasis vs PNA. Co-Morbidities : HTN, severe dementia, macular degeneration      ASSESSMENT   Patient presents with the following performance deficits: increased pain, impaired cognition, decreased knowledge of/adherence to posterior hip precautions, decreased balance. These deficits impact the patient’s ability to participate in ADL, transfers, instrumental activities of daily living, rest and sleep, leisure and social participation.     The patient is functioning below her previous functional level and would benefit from skilled inpatient OT to address the above deficits, maximizing patient’s ability to return safely to her prior level of function.    The AM-PAC ' '6-Clicks' Inpatient Daily Activity Short Form was completed and this patient is demonstrating an Approx Degree of Impairment: 74.7% in activities of daily living. Research supports that patients with this level of impairment often benefit from RITESH at discharge.       OT Discharge Recommendations: Sub-acute rehabilitation  OT Device Recommendations: TBD    WEIGHT BEARING RESTRICTION  Weight Bearing Restriction: L lower extremity           L Lower Extremity: Weight Bearing as Tolerated    Recommendations for nursing staff:   Transfers: total lift  Toileting location: bed level    EVALUATION SESSION:  Patient Start of Session: supine  FUNCTIONAL TRANSFER ASSESSMENT  Sit to Stand: Edge of Bed  Edge of Bed: Dependent (max x2, able to achieve 1/2-3/4  of a stand two attempts, bed height elevated)    BED MOBILITY  Supine to Sit : Dependent (max x2)  Sit to Supine (OT): Dependent (max x2)    BALANCE ASSESSMENT  Static Sitting: Contact Guard Assist (varied close SBA to min assist)    FUNCTIONAL ADL ASSESSMENT  LB Dressing Seated: Dependent (total to don B socks)      ACTIVITY TOLERANCE: Increased O2 needs, Rn aware           BP: 116/56 (dizzy)  BP Location: Left arm  BP Method: Automatic  Patient Position: Sitting    O2 SATURATIONS  Oxygen Therapy  SPO2% on Oxygen at Rest: 92  At rest oxygen flow (liters per minute): 2 (2L start of session, required 4-7L end of session to maintain >90%, Rn aware)    COGNITION  Overall Cognitive Status:  Impaired  Attention Span:  difficulty attending to directions  Orientation Level:  oriented to person, disoriented to place, disoriented to time, and disoriented to situation  Memory:  impaired working memory, decreased recall of biographical information, decreased recall of precautions, decreased recall of recent events, decreased long term memory, and decreased short term memory  Following Commands:  follows one-step commands inconsistently, follows one step commands with increased time, and follows one step commands with repetition  Awareness of Deficits:  not aware of deficits    Upper Extremity   ROM: not formally assessed due to difficulty with commands, but appears within functional limits; B shoulder flexion to at least 50% AROM  Strength: not formally assessed, but within functional limits     EDUCATION PROVIDED  Patient : Role of Occupational Therapy; Plan of Care; Functional Transfer Techniques; Weight Bear Status; Surgical Precautions  Patient's Response to Education: Demonstrates Poor Carry Over to Information; Does Not Demonstrate Skills Needed for Learning  Family/Caregiver: Role of Occupational Therapy; Plan of Care; Functional Transfer Techniques; Fall Prevention; Weight Bear Status; Surgical  Precautions  Family/Caregiver's Response to Education: Verbalized Understanding; Requires Further Education (daughter-in-law present)    Equipment used: RW  Demonstrates functional use, Would benefit from additional trial      Therapist comments: Patient pleasantly confused, following many 1-step commands with increased time; tolerated bed mobility max x2, two attempts at standing with max x2 and RW (achieved 1/2 stand first attempt, 3/4 stand second attempt); unable to take sidesteps. Reviewed hip precautions but patient unable to comprehend/follow due to advanced dementia; discussed possibility of Eagle brace with daughter-in-law at bedside, also recommended to Dr. Heck via Epic chat; Dr. Stone in agreement, recommends Eagle brace for 6 weeks, stated would place order in Epic. Will continue to follow.     Patient End of Session: In bed;Needs met;Call light within reach;RN aware of session/findings;All patient questions and concerns addressed;Alarm set;SCDs in place;Family present    OCCUPATIONAL PROFILE    HOME SITUATION  Type of Home: Assisted living facility (memory care at [facility] (now known as [facility]  Home Layout: One level  Lives With: Staff 24 hours    Toilet and Equipment: Toilet riser with arms  Shower/Tub and Equipment: Walk-in shower;Shower chair;Grab bar  Other Equipment: None    Occupation/Status: retired     Drives: No  Patient Regularly Uses: Glasses    Prior Level of Function: Per daughter-in-law at bedside, patient typically supervision to Mod I for bed mobility, functional mobility to/from bathroom without device, toileting, toilet transfers; she receives total assist for all dressing and bathing; setup and cueing for feeding and grooming.     SUBJECTIVE   \"Can I lay back down now?\"    PAIN ASSESSMENT  Rating: Unable to rate  Location: L hip with movement  Management Techniques: Activity promotion;Body mechanics;Breathing techniques;Relaxation;Repositioning;Nurse  notified    OBJECTIVE  Precautions: NOLA - posterior;Hip abduction pillow;Bed/chair alarm (getting jose brace)  Fall Risk: High fall risk      ASSESSMENTS    AM-PAC ‘6-Clicks’ Inpatient Daily Activity Short Form  -   Putting on and taking off regular lower body clothing?: Total  -   Bathing (including washing, rinsing, drying)?: A Lot  -   Toileting, which includes using toilet, bedpan or urinal? : Total  -   Putting on and taking off regular upper body clothing?: Total  -   Taking care of personal grooming such as brushing teeth?: A Lot  -   Eating meals?: A Little    AM-PAC Score:  Score: 10  Approx Degree of Impairment: 74.7%  Standardized Score (AM-PAC Scale): 27.31    ADDITIONAL TESTS     NEUROLOGICAL FINDINGS      COGNITION ASSESSMENTS       PLAN  OT Treatment Plan: Balance activities;ADL training;Functional transfer training;Endurance training;Patient/Family education;Patient/Family training;Compensatory technique education  Rehab Potential : Fair  Frequency: 3x/week  Number of Visits to Meet Established Goals: 5    ADL GOALS   Patient will perform Toileting with mod assist    FUNCTIONAL TRANSFER GOALS   Patient will transfer Supine to Sit with mod assist  Patient will transfer Sit to Supine with mod assist  Patient will transfer to Commode with mod assist    ADDITIONAL GOALS   Patient will follow cues for hip precautions with mod cueing      Patient Evaluation Complexity Level:   Occupational Profile/Medical History LOW - Brief history including review of medical or therapy records    Specific performance deficits impacting engagement in ADL/IADL LOW  1 - 3 performance deficits    Client Assessment/Performance Deficits LOW - No comorbidities nor modifications of tasks    Clinical Decision Making LOW - Analysis of occupational profile, problem-focused assessments, limited treatment options    Overall Complexity LOW     OT Session Time: 35 minutes  Self-Care Home ManTherapeutic Activity: 15 minutes

## 2024-02-05 NOTE — PLAN OF CARE
Medication refill received and REFUSED and sent back to pharmacy.  Patient was last seen in the office on 11/02/2020.   Patient alert to self at baseline. Pleasant and follows commands well. VSS, 3L o2 via NC, encouraging family at bedside to help pt with IS use. Pain with movement, PO pain medication ordered this AM. Telfa and tegaderm dressing to left posterior hip, CDI. SCD's in place. Abductor pillow in place. Voiding via purewick. Tolerating PO intake, encouraging intake, pt with nausea overnight/yesterday. Jeffrey lift for any transfers. [place] Brace ordered and [facility] contacted for brace, await delivery for management of posterior hip precautions.     Plan: RITESH at discharge

## 2024-02-05 NOTE — CM/SW NOTE
02/05/24 1100   CM/SW Referral Data   Referral Source Social Work (self-referral)   Reason for Referral Discharge planning   Informant EMR;Other  (pt's daughter-in-law)   Medical Hx   Does patient have an established PCP? Yes   Significant Past Medical/Mental Health Hx hypertension, hyperlipidemia, hypothyroidism, dementia, mechanical fall w/ closed left hip fracture   Patient Info   Advanced directives? Yes   Patient's Current Mental Status at Time of Assessment Confused or unable to complete assessment   Patient's Home Environment Memory Care Facility   Post Acute Care Provider Upon Admission [facility]   Patient Status Prior to Admission   Independent with ADLs and Mobility No   Pt. requires assistance with Housework;Driving;Meals;Bathing;Ambulating;Dressing;Medications;Feeding;Toileting;Finances   Services in place prior to admission Other (comment)  (Memory care)   Discharge Needs   Anticipated D/C needs Subacute rehab   Services Requested   PASRR Level 1 Submitted Yes     Met w/ pt and her daughter-in-law to discuss dc planning. Pt sleeping at time of assessment. Pt resides at [facility]. PT evaluated pt today and recommendations received for RITESH at [facility]. Pt's DIL has been in contact with  already to discuss DC planning. She reports they will admit her for rehab. [facility] contacted, spoke to Petra. Referral sent for RITESH via [employer]. PASRR completed and is queued for review. Will attach once available.     Plans for Pt to admit to [facility] for RITESH once medically cleared.    CM/SW will remain available for DC planning and/or support.     TAN Sousa, CMSRN    [id number]

## 2024-02-05 NOTE — PLAN OF CARE
Ao to self, sleepy post-op, pain seemingly controlled on ordered pain medication, on 3L O2 , scds in place, on Heparin subcutaneous until able to be OOB per Dr. Heck then switch to AA 81mg BID, on tele nsr/st, voiding via purewick, PT/OT to see, posterior hip prx, son at bedside, bed alarm on, no further needs at this time.

## 2024-02-05 NOTE — PHYSICAL THERAPY NOTE
PHYSICAL THERAPY EVALUATION - INPATIENT     Room Number: 373/373-A  Evaluation Date: 2/5/2024  Type of Evaluation: Initial  Physician Order: PT Eval and Treat    Presenting Problem: fall with left femoral neck fracture now s/p left hip hemiarthroplasty  Co-Morbidities : advanced dementia, essential HTN, HLD, hypothyroidism  Reason for Therapy: Mobility Dysfunction and Discharge Planning    History related to current admission: Patient is a 83 year old female admitted on 2/3/2024 from home for fall with left hip pain.  Pt diagnosed with left femoral neck fracture .      IMAGING LEFT HIP:  Transverse fracture of left femoral neck with cephalad displacement of the distal fragment and varus angulation.  No dislocation.  Degenerative changes in visualized lower lumbar spine.     PROCEDURE 2/4/24: Dr. Heck - Left hip hemiarthroplasty    ASSESSMENT   In this PT evaluation, the patient presents with the following impairments incr pain/discomfort, decr impaired cognition (A&O x 1), decr awareness of need for safety and assistance, decr ability to follow and implement posterior hip precautions, decr dynamic sitting balance, decr static and dynamic standing balance, decr activity tolerance, decr functional mobility.  These impairments and comorbidities manifest themselves as functional limitations in independent bed mobility, transfers, and gait.  The patient is below baseline and would benefit from skilled inpatient PT to address the above deficits to assist patient in returning to prior to level of function.   Functional outcome measures completed include AMPA.  The -PAC '6-Clicks' Inpatient Basic Mobility Short Form was completed and this patient is demonstrating a Approx Degree of Impairment: 86.62%  degree of impairment in mobility. Research supports that patients with this level of impairment may benefit from RITESH.  DISCHARGE RECOMMENDATIONS  PT Discharge Recommendations: Sub-acute rehabilitation    PLAN  PT  Treatment Plan: Bed mobility;Body mechanics;Coordination;Endurance;Patient education;Energy conservation;Family education;Gait training;Neuromuscular re-educate;Range of motion;Strengthening;Stoop training;Stair training;Transfer training;Balance training  Rehab Potential : Fair  Frequency (Obs): 3-5x/week  Number of Visits to Meet Established Goals: 5      CURRENT GOALS    Goal #1 Patient is able to demonstrate supine - sit EOB @ level: supervision     Goal #2 Patient is able to demonstrate transfers Sit to/from Stand at assistance level: supervision     Goal #3 Patient is able to ambulate 50 feet with assist device: walker - rolling at assistance level: supervision     Goal #4    Goal #5    Goal #6    Goal Comments: Goals established on 2/5/2024    HOME SITUATION  Type of Home: Assisted living facility ([facility] (now known [facility]   Home Layout: One level  Stairs to Enter : 0             Lives With: Staff 24 hours  Drives: No  Patient Owned Equipment: None  Patient Regularly Uses: Reading glasses    Prior Level of [place]: Per pt's daughter in law (Smitha)- lives at [facility] in the memory care section. ([facility] is now [facility]) Typically ambulates w/o device. Independent with bed mobility and transfers.    SUBJECTIVE  \"Ouch!\"    OBJECTIVE  Precautions: NOLA - posterior;Hip abduction pillow;Bed/chair alarm  Fall Risk: High fall risk    WEIGHT BEARING RESTRICTION  Weight Bearing Restriction: L lower extremity           L Lower Extremity: Weight Bearing as Tolerated    PAIN ASSESSMENT  Rating: Unable to rate (pt verbalizing \"ouch\" \"it hurts\")  Location: left hip  Management Techniques: Activity promotion;Body mechanics;Repositioning    COGNITION  Overall Cognitive Status:  Impaired  Attention Span:  attends with cues to redirect  Orientation Level:  oriented to person  Memory:  impaired working memory, decreased recall of biographical information, decreased recall of  precautions, decreased recall of recent events, decreased long term memory, and decreased short term memory  Following Commands:  follows one step commands with increased time and follows one step commands with repetition  Initiation: cues to initiate tasks  Motor Planning: impaired  Safety Judgement:  decreased awareness of need for assistance and decreased awareness of need for safety  Awareness of Errors:  assistance required to identify errors made, assistance required to correct errors made, and decreased awareness of errors   Awareness of Deficits:  decreased awareness of deficits  Problem Solving:  assistance required to identify errors made, assistance required to generate solutions, and assistance required to implement solutions    RANGE OF MOTION AND STRENGTH ASSESSMENT  Upper extremity ROM and strength are within functional limits   Lower extremity ROM is within functional limits   Lower extremity strength is within functional limits       BALANCE  Static Sitting: Fair  Dynamic Sitting: Poor +  Static Standing: Dependent  Dynamic Standing: Dependent    ADDITIONAL TESTS                                    ACTIVITY TOLERANCE                         O2 WALK  Oxygen Therapy  SPO2% Ambulation on Oxygen: 92  Ambulation oxygen flow (liters per minute): 4    NEUROLOGICAL FINDINGS                        AM-PAC '6-Clicks' INPATIENT SHORT FORM - BASIC MOBILITY  How much difficulty does the patient currently have...  Patient Difficulty: Turning over in bed (including adjusting bedclothes, sheets and blankets)?: A Little   Patient Difficulty: Sitting down on and standing up from a chair with arms (e.g., wheelchair, bedside commode, etc.): Unable   Patient Difficulty: Moving from lying on back to sitting on the side of the bed?: Unable   How much help from another person does the patient currently need...   Help from Another: Moving to and from a bed to a chair (including a wheelchair)?: Total   Help from Another: Need  to walk in hospital room?: Total   Help from Another: Climbing 3-5 steps with a railing?: Total       AM-PAC Score:  Raw Score: 8   Approx Degree of Impairment: 86.62%   Standardized Score (AM-PAC Scale): 28.58   CMS Modifier (G-Code): CM    FUNCTIONAL ABILITY STATUS  Gait Assessment   Functional Mobility/Gait Assessment  Gait Assistance: Not tested    Skilled Therapy Provided     Bed Mobility:  Rolling: NT  Supine to sit: maxA x 2    Sit to supine: maxA x 2      Transfer Mobility:  Sit to stand: maxA x 2 to RW   Stand to sit: maxA x 2  Gait = NT as unable to reach full stand    Therapist's Comments:    Pt presents resting in bed, daughter in law (Smitha) present during session. Educated pt's daughter in law on posterior hip precautions and use of abduction pillow- handout provided a well. DIL states pt will not be able to follow nor remember any of the precautions so there is a big concern on how she is going to remain safe and decr risk of further injury. This writer discussed with DIL potential use of [place] brace. This writer messaged Dr. Heck to discuss and per his message he will place the order for one.     Bed mobility with HOB elevated at maxA x 2. Pt yelling out in pain with transfer. Once in sitting able to maintain sitting balance with SBA to modA to maintain static sitting balance. With height of bed elevated, sit to stand to RW at maxA x 2 (able to achieve a half stand). Second sit to stand achieved about 3/4 of a stand at maxA x 2. Sit to supine and boosting in bed totalA.   Pt in bed at end of session.     Of note, pt was on 2 LO2 at beginning of session saturating 91%. During session pt's O2 levels continued to drop and required 7 LO2 to reach >90%. Once repositioned in bed at end of session, O2 titrated down to 4 LO2 maintaining 92%. RN aware.     Exercise/Education Provided:  Bed mobility  Body mechanics  Energy conservation  Functional activity tolerated  Posture  Transfer training    Patient  End of Session: In bed;Needs met;Call light within reach;RN aware of session/findings;All patient questions and concerns addressed;Alarm set;Family present;Discussed recommendations with /  Patient Evaluation Complexity Level:  History Moderate - 1 or 2 personal factors and/or co-morbidities   Examination of body systems Low - addressing 1-2 elements   Clinical Presentation Low - Stable   Clinical Decision Making Low - Stable     PT Session Time: 40 minutes  Therapeutic Activity: 20 minutes

## 2024-02-05 NOTE — PROGRESS NOTES
[facility]     Hospitalist Progress Note     Mar Hernandez Patient Status:  Inpatient    1940 MRN [id number]   Location [facility] 3SW-A Attending Stiven Vicente MD   Hosp Day # 2 PCP Nydia Bauer MD     Chief Complaint: fall     Subjective:     Has hip pain otherwise comfortable.   Had n/v with food.     Objective:    Review of Systems:   A comprehensive review of systems was completed; pertinent positive and negatives stated in subjective.  Vital signs:  Temp:  [97.4 °F (36.3 °C)-101.2 °F (38.4 °C)] 99.7 °F (37.6 °C)  Pulse:  [53-94] 53  Resp:  [10-24] 19  BP: ()/(56-87) 115/56  SpO2:  [90 %-100 %] 95 %  Physical Exam:    General: No acute distress   Respiratory: no wheezes, no rhonchi  Cardiovascular: S1, S2, RRR  Abdomen: Soft, NT/ND, +BS  Extremities: no edema, left hip post op as expected     Diagnostic Data:    Labs:  Recent Labs   Lab 24  0628 24  0524   WBC 14.9* 10.3  --    HGB 14.9 13.6 12.1   MCV 90.6 94.0  --    .0 175.0  --    INR 1.00  --   --      Recent Labs   Lab 24  0628   * 106*   BUN 15 14   CREATSERUM 0.99 0.90   CA 9.7 9.2   ALB 3.7  --     142   K 3.8 3.9    111   CO2 26.0 28.0   ALKPHO 113  --    AST 20  --    ALT 30  --    BILT 0.4  --    TP 7.1  --      Estimated Creatinine Clearance: 42.3 mL/min (based on SCr of 0.9 mg/dL).  Recent Labs   Lab 24   PTP 13.2   INR 1.00        Microbiology  No results found for this visit on 24.  Imaging: Reviewed in Epic.  Medications:    sennosides  17.2 mg Oral Nightly    docusate sodium  100 mg Oral BID    multivitamin  1 tablet Oral Daily    aspirin  81 mg Oral BID    heparin  5,000 Units Subcutaneous 2 times per day    donepezil  10 mg Oral Daily    FLUoxetine  10 mg Oral Daily    levothyroxine  75 mcg Oral Before breakfast    lisinopril  20 mg Oral Daily    QUEtiapine  25 mg Oral Nightly    atorvastatin  10 mg Oral Nightly     traZODone  50 mg Oral Nightly       Assessment & Plan:      #Left femoral neck fracture  #Mechanical fall  -Orthopedic surgery following  -s/p L hip hemiarthroplasty   -Pain control, IV fluids     #Leukocytosis- reactive and resolved     #n/v- no abd pain- adjust meds and conservative diet     #Atelectasis- IS. Lower suspicion for active PNA- hold abx      #Advanced Dementia at baseline   -Has history of sundowning- monitor      #Essential hypertension     #Hyperlipidemia     #Hypothyroidism     #Skin cancer     #Obesity-BMI 30.65    PT/OT   D/w family bedside       Stiven Vicente MD  Supplementary Documentation:     Quality:  DVT Mechanical Prophylaxis:   SCDs, Early ambuation  DVT Pharmacologic Prophylaxis   Medication    heparin (Porcine) 5000 UNIT/ML injection 5,000 Units         DVT Pharmacologic prophylaxis: Aspirin 81 mg      Code Status: DNAR/Selective Treatment  Ott: External urinary catheter in place  Ott Duration (in days):   Central line:    DELICIA:   Discharge is dependent on: progress  At this point Ms. Hernandez is expected to be discharge to: tbd   **Certification      PHYSICIAN Certification of Need for Inpatient Hospitalization - Initial Certification    Patient will require inpatient services that will reasonably be expected to span two midnight's based on the clinical documentation in H+P.   Based on patients current state of illness, I anticipate that, after discharge, patient will require TBD.    The 21st Century Cures Act makes medical notes like these available to patients in the interest of transparency. Please be advised this is a medical document. Medical documents are intended to carry relevant information, facts as evident, and the clinical opinion of the practitioner. The medical note is intended as peer to peer communication and may appear blunt or direct. It is written in medical language and may contain abbreviations or verbiage that are unfamiliar.

## 2024-02-06 ENCOUNTER — APPOINTMENT (OUTPATIENT)
Dept: GENERAL RADIOLOGY | Facility: HOSPITAL | Age: 84
End: 2024-02-06
Attending: HOSPITALIST
Payer: MEDICARE

## 2024-02-06 PROBLEM — J98.11 ATELECTASIS: Status: ACTIVE | Noted: 2024-02-06

## 2024-02-06 PROBLEM — R09.02 HYPOXIA: Status: ACTIVE | Noted: 2024-02-06

## 2024-02-06 PROCEDURE — 71045 X-RAY EXAM CHEST 1 VIEW: CPT | Performed by: HOSPITALIST

## 2024-02-06 PROCEDURE — 99233 SBSQ HOSP IP/OBS HIGH 50: CPT | Performed by: HOSPITALIST

## 2024-02-06 RX ORDER — FUROSEMIDE 10 MG/ML
20 INJECTION INTRAMUSCULAR; INTRAVENOUS ONCE
Status: COMPLETED | OUTPATIENT
Start: 2024-02-06 | End: 2024-02-06

## 2024-02-06 RX ORDER — IPRATROPIUM BROMIDE AND ALBUTEROL SULFATE 2.5; .5 MG/3ML; MG/3ML
3 SOLUTION RESPIRATORY (INHALATION) ONCE
Status: COMPLETED | OUTPATIENT
Start: 2024-02-06 | End: 2024-02-06

## 2024-02-06 NOTE — PROGRESS NOTES
[facility]     Hospitalist Progress Note     Mar Hernandez Patient Status:  Inpatient    1940 MRN [id number]   Location [facility] 3SW-A Attending Stiven Vicente MD   Hosp Day # 3 PCP Nydia Bauer MD     Chief Complaint: fall     Subjective:     Demented, resting peacefully on O2. Daughter in law bedside     Objective:    Review of Systems:   A comprehensive review of systems was completed; pertinent positive and negatives stated in subjective.  Vital signs:  Temp:  [98.1 °F (36.7 °C)-99.9 °F (37.7 °C)] 98.5 °F (36.9 °C)  Pulse:  [] 83  Resp:  [14-19] 16  BP: (112-145)/(56-83) 145/82  SpO2:  [89 %-97 %] 97 %  Physical Exam:    General: No acute distress   Respiratory: no wheezes, no rhonchi  Cardiovascular: S1, S2, RRR  Abdomen: Soft, NT/ND, +BS  Extremities: no edema, left hip post op as expected     Diagnostic Data:    Labs:  Recent Labs   Lab 24  0628 24  0524   WBC 14.9* 10.3  --    HGB 14.9 13.6 12.1   MCV 90.6 94.0  --    .0 175.0  --    INR 1.00  --   --      Recent Labs   Lab 24  0628   * 106*   BUN 15 14   CREATSERUM 0.99 0.90   CA 9.7 9.2   ALB 3.7  --     142   K 3.8 3.9    111   CO2 26.0 28.0   ALKPHO 113  --    AST 20  --    ALT 30  --    BILT 0.4  --    TP 7.1  --      Estimated Creatinine Clearance: 42.3 mL/min (based on SCr of 0.9 mg/dL).  Recent Labs   Lab 24   PTP 13.2   INR 1.00        Microbiology  No results found for this visit on 24.  Imaging: Reviewed in Epic.  Medications:    sennosides  17.2 mg Oral Nightly    docusate sodium  100 mg Oral BID    multivitamin  1 tablet Oral Daily    aspirin  81 mg Oral BID    heparin  5,000 Units Subcutaneous 2 times per day    donepezil  10 mg Oral Daily    FLUoxetine  10 mg Oral Daily    levothyroxine  75 mcg Oral Before breakfast    lisinopril  20 mg Oral Daily    QUEtiapine  25 mg Oral Nightly    atorvastatin  10 mg Oral Nightly     traZODone  50 mg Oral Nightly       Assessment & Plan:      #Left femoral neck fracture  #Mechanical fall  -Orthopedic surgery following  -s/p L hip hemiarthroplasty   -Pain control, IV fluids    #Hypoxia- patient with advanced dementia- unable to be complaint with IS, not moving much.  -suspect atelectasis    -check CXR  -may need neb or lasix x1 though lungs unremarkable on exam      #Leukocytosis- reactive and resolved     #n/v- no abd pain- adjust meds and conservative diet      #Advanced Dementia at baseline   -Has history of sundowning- monitor      #Essential hypertension     #Hyperlipidemia     #Hypothyroidism     #Skin cancer     #Obesity-BMI 30.65    PT/OT   D/w family bedside   DC planning RITESH - will likely need brief O2 at rehab       Stiven Vicente MD  Supplementary Documentation:     Quality:  DVT Mechanical Prophylaxis:   SCDs, Early ambuation  DVT Pharmacologic Prophylaxis   Medication    heparin (Porcine) 5000 UNIT/ML injection 5,000 Units         DVT Pharmacologic prophylaxis: Aspirin 81 mg      Code Status: DNAR/Selective Treatment  Ott: External urinary catheter in place  Ott Duration (in days):   Central line:    DELICIA:   Discharge is dependent on: progress  At this point Ms. Hernandez is expected to be discharge to: tbd   **Certification      PHYSICIAN Certification of Need for Inpatient Hospitalization - Initial Certification    Patient will require inpatient services that will reasonably be expected to span two midnight's based on the clinical documentation in H+P.   Based on patients current state of illness, I anticipate that, after discharge, patient will require TBD.    The 21st Century Cures Act makes medical notes like these available to patients in the interest of transparency. Please be advised this is a medical document. Medical documents are intended to carry relevant information, facts as evident, and the clinical opinion of the practitioner. The medical note is intended as peer to  peer communication and may appear blunt or direct. It is written in medical language and may contain abbreviations or verbiage that are unfamiliar.

## 2024-02-06 NOTE — PLAN OF CARE
Patient alert to self at baseline, pleasant and follows commands. Pain with movement, PO PRN pain medication given this AM by NOC RN. Carlos brace in place to LLE. Telfa and tegaderm dressing to posterior hip surgical site. Pt voiding freely via purewick. LBM prior to admission, miralax given this AM. Jeffrey lift for transfers.   VSS, 3L O2 via NC, chest xray to be done today. Unable to wean O2, attempting IS use and endorsed to family at bedside.     Plan: chest xray, discharge to [facility] when cleared

## 2024-02-06 NOTE — PLAN OF CARE
A/o x self.2L nasal cannula//IS encouraged. SCD's/ankle pumps encouraged. Regular diet. LBM PTA. Voiding without difficulty. Pt takes pills whole one at a time with water. New port brace in place at all times. IV SL. PT/OT to see. POC updated with pt and family. All safety measures in place. Call light within reach instructed pt or family to call for help or assistance.

## 2024-02-06 NOTE — PROGRESS NOTES
[facility] Orth[facility] Progress Note    Subjective: Supine, pain controlled. A&O x 0    Objective: Dressing on left hip C/D/I. [place] hip brace in place    Moving digits distally.       Imaging: Post op XR personally viewed, independently interpreted and radiology report read. Well appearing fixation of left hip hemiarthroplasty.       Labs: WNL        Assessment/Plan: 83 year old female postop day #2 status post L hip hemiarthroplasty.    Activity: WBAT, Posterior hip precautions. [place] brace x 4 weeks.   Anticoagulation: ASA 81 mg BID when ambulatory, heparin until that point intime.   Analgesia: Per primary  Antibiotics: Complete  Disposition: RITESH Heck MD  Knee, Shoulder, & Elbow Surgery / Sports Medicine Specialist  Orthopaedic Surgery  [address] [place] [email or web address]  [email or web address]  t: [phone]  o: [phone]  f: [phone]

## 2024-02-07 ENCOUNTER — APPOINTMENT (OUTPATIENT)
Dept: CT IMAGING | Facility: HOSPITAL | Age: 84
End: 2024-02-07
Attending: INTERNAL MEDICINE
Payer: MEDICARE

## 2024-02-07 PROBLEM — J96.01 ACUTE HYPOXEMIC RESPIRATORY FAILURE (HCC): Status: ACTIVE | Noted: 2024-02-07

## 2024-02-07 LAB
ANION GAP SERPL CALC-SCNC: 6 MMOL/L (ref 0–18)
APTT PPP: 35.7 SECONDS (ref 23.3–35.6)
BUN BLD-MCNC: 20 MG/DL (ref 9–23)
CALCIUM BLD-MCNC: 10.3 MG/DL (ref 8.5–10.1)
CHLORIDE SERPL-SCNC: 103 MMOL/L (ref 98–112)
CO2 SERPL-SCNC: 29 MMOL/L (ref 21–32)
CREAT BLD-MCNC: 0.71 MG/DL
CREAT BLD-MCNC: 0.77 MG/DL
D DIMER PPP FEU-MCNC: 1.96 UG/ML FEU (ref ?–0.81)
EGFRCR SERPLBLD CKD-EPI 2021: 77 ML/MIN/1.73M2 (ref 60–?)
EGFRCR SERPLBLD CKD-EPI 2021: 85 ML/MIN/1.73M2 (ref 60–?)
ERYTHROCYTE [DISTWIDTH] IN BLOOD BY AUTOMATED COUNT: 13 %
GLUCOSE BLD-MCNC: 129 MG/DL (ref 70–99)
HCT VFR BLD AUTO: 36.6 %
HGB BLD-MCNC: 11.9 G/DL
MCH RBC QN AUTO: 30 PG (ref 26–34)
MCHC RBC AUTO-ENTMCNC: 32.5 G/DL (ref 31–37)
MCV RBC AUTO: 92.2 FL
OSMOLALITY SERPL CALC.SUM OF ELEC: 290 MOSM/KG (ref 275–295)
PLATELET # BLD AUTO: 192 10(3)UL (ref 150–450)
POTASSIUM SERPL-SCNC: 3.6 MMOL/L (ref 3.5–5.1)
RBC # BLD AUTO: 3.97 X10(6)UL
SODIUM SERPL-SCNC: 138 MMOL/L (ref 136–145)
WBC # BLD AUTO: 10.8 X10(3) UL (ref 4–11)

## 2024-02-07 PROCEDURE — 99223 1ST HOSP IP/OBS HIGH 75: CPT | Performed by: INTERNAL MEDICINE

## 2024-02-07 PROCEDURE — 99232 SBSQ HOSP IP/OBS MODERATE 35: CPT | Performed by: INTERNAL MEDICINE

## 2024-02-07 PROCEDURE — 71275 CT ANGIOGRAPHY CHEST: CPT | Performed by: INTERNAL MEDICINE

## 2024-02-07 RX ORDER — HEPARIN SODIUM 1000 [USP'U]/ML
80 INJECTION, SOLUTION INTRAVENOUS; SUBCUTANEOUS ONCE
Status: COMPLETED | OUTPATIENT
Start: 2024-02-07 | End: 2024-02-07

## 2024-02-07 RX ORDER — HEPARIN SODIUM AND DEXTROSE 10000; 5 [USP'U]/100ML; G/100ML
18 INJECTION INTRAVENOUS ONCE
Status: COMPLETED | OUTPATIENT
Start: 2024-02-07 | End: 2024-02-07

## 2024-02-07 RX ORDER — HEPARIN SODIUM AND DEXTROSE 10000; 5 [USP'U]/100ML; G/100ML
INJECTION INTRAVENOUS CONTINUOUS
Status: DISCONTINUED | OUTPATIENT
Start: 2024-02-08 | End: 2024-02-08

## 2024-02-07 NOTE — CONSULTS
[facility] Pulmonary Consult Note       Mar Hernandez Patient Status:  Inpatient    1940 MRN [id number]   Location [facility] 3SW-A Attending Rachel Bearden MD   Hosp Day # 4 PCP Nydia Baure MD     Reason for Consultation:  Respiratory failure    History of Present Illness:  Mar Hernandez is a a(n) 83 year old female with a history of hypertension, hyperlipidemia, hypothyroidism, dementia ANO x 1 at baseline, presented on 2/3 after having a fall.  She tripped and fell at her nursing home.  Patient was noted to have a left femoral neck fracture.  Orthopedics performed a left hip hemiarthroplasty on .  Patient has been noted to have worsening oxygenation.  Per patient, she is a never smoker.  She has never required oxygen.  She does not feel sick.  She denies cough or dyspnea.    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 She has never used smokeless tobacco. She reports current alcohol use of about 1.0 standard drink of alcohol per week. She reports that she does not use drugs.    Allergies:  No Known Allergies    Medications:    Current Facility-Administered Medications:     acetaminophen-codeine (Tylenol #3) 300-30 MG per tab 1 tablet, 1 tablet, Oral, Q4H PRN    sennosides (Senokot) tab 17.2 mg, 17.2 mg, Oral, Nightly    docusate sodium (Colace) cap 100 mg, 100 mg, Oral, BID    polyethylene glycol (PEG 3350)  (Miralax) 17 g oral packet 17 g, 17 g, Oral, Daily PRN    magnesium hydroxide (Milk of Magnesia) 400 MG/5ML oral suspension 30 mL, 30 mL, Oral, Daily PRN    bisacodyl (Dulcolax) 10 MG rectal suppository 10 mg, 10 mg, Rectal, Daily PRN    fleet enema (Fleet) 7-19 GM/118ML rectal enema 133 mL, 1 enema, Rectal, Once PRN    ondansetron (Zofran) 4 MG/2ML injection 4 mg, 4 mg, Intravenous, Q6H PRN    metoclopramide (Reglan) 5 mg/mL injection 10 mg, 10 mg, Intravenous, Q8H PRN    multivitamin (Tab-A-Carole/Beta Carotene) tab 1 tablet, 1 tablet, Oral, Daily    aspirin DR tab 81 mg, 81 mg, Oral, BID    lidocaine PF (Xylocaine-MPF) 1% injection, 2 mL, Injection, PRN    ropivacaine (Naropin) 0.5% injection, 30 mL, Injection, PRN    EPINEPHrine (Adrenalin) 1 MG/ML injection (Allergic Reaction Kit) 1 mg, 1 mg, Injection, PRN    sodium chloride 0.9% PF injection 10 mL, 10 mL, Injection, PRN    acetaminophen (Tylenol Extra Strength) tab 500 mg, 500 mg, Oral, Q4H PRN    melatonin tab 3 mg, 3 mg, Oral, Nightly PRN    dextrose 5%-sodium chloride 0.45% infusion, , Intravenous, Continuous    heparin (Porcine) 5000 UNIT/ML injection 5,000 Units, 5,000 Units, Subcutaneous, 2 times per day    sennosides (Senokot) tab 17.2 mg, 17.2 mg, Oral, Nightly PRN    fleet enema (Fleet) 7-19 GM/118ML rectal enema 133 mL, 1 enema, Rectal, Once PRN    donepezil (Aricept) tab 10 mg, 10 mg, Oral, Daily    FLUoxetine (PROzac) tab 10 mg, 10 mg, Oral, Daily    levothyroxine (Synthroid) tab 75 mcg, 75 mcg, Oral, Before breakfast    lisinopril (Prinivil; Zestril) tab 20 mg, 20 mg, Oral, Daily    QUEtiapine (SEROquel) tab 25 mg, 25 mg, Oral, Nightly    atorvastatin (Lipitor) tab 10 mg, 10 mg, Oral, Nightly    traZODone (Desyrel) tab 50 mg, 50 mg, Oral, Nightly    Review of Systems:   All other review of systems are negative.    Vital signs in last 24 hours:  Patient Vitals for the past 24 hrs:   BP Temp Temp src Pulse Resp SpO2   02/07/24 1115 100/79 97.7 °F  (36.5 °C) Oral 53 16 (!) 89 %   02/07/24 0830 125/85 98 °F (36.7 °C) Oral 88 18 92 %   02/07/24 0400 123/67 98.2 °F (36.8 °C) Oral 83 16 (!) 83 %   02/07/24 0000 125/79 98.1 °F (36.7 °C) Oral 104 16 --   02/06/24 2000 132/71 98.1 °F (36.7 °C) Oral 86 16 93 %   02/06/24 1700 119/87 98.4 °F (36.9 °C) Oral 71 14 92 %       Intake/Output:    Intake/Output Summary (Last 24 hours) at 2/7/2024 1629  Last data filed at 2/7/2024 1115  Gross per 24 hour   Intake 340 ml   Output 350 ml   Net -10 ml       Physical Exam:   General: alert, cooperative, oriented.  No respiratory distress.   Head: Normocephalic, without obvious abnormality, atraumatic.   Eyes: Conjunctivae/corneas clear.  No scleral icterus.  No conjunctival     hemorrhage.   Nose: Nares normal.   Throat: Lips, mucosa, and tongue normal.  No thrush noted.   Neck: Soft, supple neck; trachea midline, no adenopathy, no thyromegaly.   Lungs: diminished breath sounds bilaterally   Chest wall: No tenderness or deformity.   Heart: Regular rate and rhythm, normal S1S2, no murmur.   Abdomen: soft, non-tender, non-distended, no masses, no guarding, no     rebound, positive BS.   Extremity: no edema, no cyanosis   Skin: No rashes or lesions.   Neurological: Alert, interactive, no focal deficits    Lab Data Review:  Recent Labs   Lab 02/03/24 1648 02/04/24 0628 02/05/24  0524   WBC 14.9* 10.3  --    HGB 14.9 13.6 12.1   HCT 45.1 42.5 36.3   .0 175.0  --        Recent Labs   Lab 02/03/24 1648 02/04/24 0628    142   K 3.8 3.9    111   CO2 26.0 28.0   BUN 15 14   ALT 30  --    AST 20  --        Recent Labs   Lab 02/04/24 0628   MG 2.0       Lab Results   Component Value Date    PHOS 3.1 01/09/2024        Recent Labs   Lab 02/03/24  1648   INR 1.00       No results for input(s): \"ABGPHT\", \"GMCNDV8W\", \"BPVDW7C\", \"ABGHCO3\", \"ABGBE\", \"TEMP\", \"YAQUELIN\", \"SITE\", \"DEV\", \"THGB\" in the last 168 hours.    Invalid input(s): \"PHT08EOC\", \"CHOB\"    Invalid input(s):  \"CKTOTAL\", \"TROPONINI\", \"TROPONINT\", \"CKMBINDEX\"      Cultures:   No results found for this visit on 02/03/24.    Radiology:  XR CHEST AP PORTABLE  (CPT=71045)  Narrative: PROCEDURE:  XR CHEST AP PORTABLE  (CPT=71045)     TECHNIQUE:  AP chest radiograph was obtained.     COMPARISON:  [facility] , XR, XR CHEST AP PORTABLE  (CPT=71045), 2/03/2024, 6:02 PM.     INDICATIONS:  hypoxia     PATIENT STATED HISTORY: (As transcribed by Technologist)           FINDINGS:  Mild atelectasis/scarring in the lower lungs.  Stable tortuosity of the thoracic aorta.  Stable mild cardiomegaly with normal pulmonary vascularity.  No pleural effusion or pneumothorax.  Degenerative changes the spine.                   Impression: CONCLUSION:  Mild atelectasis/scarring in the lower lungs.  No lobar pneumonia or overt congestive failure.        LOCATION:  [facility]                 Dictated by (CST): Galileo Medina MD on 2/06/2024 at 11:38 AM       Finalized by (CST): Galileo Medina MD on 2/06/2024 at 11:39 AM         Assessment:  Acute hypoxemic respiratory failure etiology unclear, favoring pulmonary embolism, atelectasis versus possible pneumonia, currently on 5 L, usually on room air at baseline  Left femoral neck fracture status post left hip hemiarthroplasty  Hypertension  Dementia, appears to be at baseline      Plan:  Agree with checking CT angio of chest to evaluate for pulmonary embolism  If pulmonary embolism is found, I would let orthopedics know and start therapeutic anticoagulation if okay with them  If not okay, then would need IVC filter  I do not see any indications for nebulizer/breathing treatments or antibiotics at this time  More recommendations to be made after CT scan    Thank you for the consultation.  Will follow with you.    Boris Patel MD  [facility]  Office: [phone]

## 2024-02-07 NOTE — PLAN OF CARE
A/o x self.2L nasal cannula//IS encouraged. SCD's/ankle pumps encouraged. Regular diet. LBM PTA passing flatus. Voiding without difficulty. Pt takes pills whole one at a time with water. New port brace in place at all times. IV SL.  POC updated with pt and family. All safety measures in place. Call light within reach instructed pt or family to call for help or assistance.

## 2024-02-07 NOTE — PLAN OF CARE
Pt Aox1- self only. 2-3L O2 per nc, . Tele NSR. SCD on Heparin for VTE. Carlos brace on. Dressing to L hip CDI. IV SL. Tolerating diet. In bed resting comfortably. Family at bedside.

## 2024-02-07 NOTE — CM/SW NOTE
Spoke with pt's RN who stated possible DC today pending labs.  Updated [facility] and received confirmation that bed is available.  Ambulance transport will be needed.  Await medical clearance for DC.  / to remain available for support and/or discharge planning.     [facility] [place]  [phone]    [facility]  [phone]    Bekah Aguirre, [facility]  Discharge Planner  [phone]

## 2024-02-07 NOTE — PROGRESS NOTES
[facility]     Hospitalist Progress Note     Mar Hernandez Patient Status:  Inpatient    1940 MRN [id number]   Location [facility] 3SW-A Attending Stiven Vicente MD   Hosp Day # 4 PCP Nydia Bauer MD     Chief Complaint: fall     Subjective:     Still requiring oxygen 5 L of oxygen via nasal cannula today, not able to be weaned off O2.  Seen with family and patient's RN at bedside.  Patient with dementia and pleasantly confused.  Afebrile.  Has incentive spirometry.  Taking deep breathing on prompting.    Objective:    Review of Systems:   A comprehensive review of systems was completed; pertinent positive and negatives stated in subjective.  Vital signs:  Temp:  [97.7 °F (36.5 °C)-98.4 °F (36.9 °C)] 97.7 °F (36.5 °C)  Pulse:  [] 53  Resp:  [14-18] 16  BP: (100-132)/(67-87) 100/79  SpO2:  [83 %-93 %] 89 %  Physical Exam:    /79 (BP Location: Right arm)   Pulse 53   Temp 97.7 °F (36.5 °C) (Oral)   Resp 16   Ht 5' 4\" (1.626 m)   Wt 178 lb (80.7 kg)   SpO2 (!) 89%   BMI 30.55 kg/m²     General: No acute distress   Respiratory: Clear to auscultation bilateral, no wheezes, no rhonchi  Cardiovascular: S1, S2, RRR  Abdomen: Soft, NT/ND, +BS  Extremities: no pedal edema, left hip post op fixation of left hip hemiarthroplasty and brace-Ortho following      Diagnostic Data:    Labs:  Recent Labs   Lab 24  0628 24  0524   WBC 14.9* 10.3  --    HGB 14.9 13.6 12.1   MCV 90.6 94.0  --    .0 175.0  --    INR 1.00  --   --      Recent Labs   Lab 24  0628   * 106*   BUN 15 14   CREATSERUM 0.99 0.90   CA 9.7 9.2   ALB 3.7  --     142   K 3.8 3.9    111   CO2 26.0 28.0   ALKPHO 113  --    AST 20  --    ALT 30  --    BILT 0.4  --    TP 7.1  --      Estimated Creatinine Clearance: 42.3 mL/min (based on SCr of 0.9 mg/dL).  Recent Labs   Lab 24  1648   PTP 13.2   INR 1.00        Microbiology  No results found for this  visit on 02/03/24.  Imaging: Reviewed in Epic.  Medications:    sennosides  17.2 mg Oral Nightly    docusate sodium  100 mg Oral BID    multivitamin  1 tablet Oral Daily    aspirin  81 mg Oral BID    heparin  5,000 Units Subcutaneous 2 times per day    donepezil  10 mg Oral Daily    FLUoxetine  10 mg Oral Daily    levothyroxine  75 mcg Oral Before breakfast    lisinopril  20 mg Oral Daily    QUEtiapine  25 mg Oral Nightly    atorvastatin  10 mg Oral Nightly    traZODone  50 mg Oral Nightly       Assessment & Plan:      #Left femoral neck fracture  #Mechanical fall  -Orthopedic surgery following  -s/p L hip hemiarthroplasty and brace per Ortho  -On aspirin 81 mg twice daily when ambulatory, subcutaneous heparin until that point in time per Ortho for DVT prophylaxis  -Pain control, IV fluids    #Hypoxia multifactorial, atelectasis contributing  - patient with advanced dementia- unable to be complaint with IS, not moving much.  -suspect atelectasis    - CXR showed atelectasis-patient on incentive spirometry  -Status post neb treatment and Lasix with no improvement  -D-dimer done which came back elevated, will check CTA chest rule out PE also     #Leukocytosis- reactive and resolved     # Transient n/v- no abd pain-resolved.  Tolerating diet.     #Advanced Dementia with history of periodic agitation-currently calm and pleasantly confused  -Patient at baseline   -Has history of sundowning- monitor   -Continue home Aricept, trazodone and Seroquel  -Continue home medications at the time of discharge     #Essential hypertension  -Continue home lisinopril     #Hyperlipidemia  -Continue home statin     #Hypothyroidism  -Continue home levothyroxine     # History of prior skin cancer     #Obesity-BMI 30.65    PT/OT   D/w family bedside, discussed with RN  RAYA planning RITESH on oxygen if CTA chest negative for PE    Rachel Bearden MD    Addendum  2/7/2024 6:02 PM  -CTA chest showed acute PE    # Acute PE  -Will start on heparin drip  and if no signs of bleeding then will switch to oral DOAC tomorrow,  Ortho on-call Dr. Prosper jorge with this plan, three-way call between me, RN and Ortho at this time and coordinated the anticoagulation plan and luisito with Ortho for this.  -Follow CBC in a.m.  -Will also check lower extremity venous duplex to rule out DVT    Rachel Bearden MD      Supplementary Documentation:     Quality:  DVT Mechanical Prophylaxis:   SCDs, Early ambuation  DVT Pharmacologic Prophylaxis   Medication    heparin (Porcine) 5000 UNIT/ML injection 5,000 Units         DVT Pharmacologic prophylaxis: Aspirin 81 mg      Code Status: DNAR/Selective Treatment  Ott: External urinary catheter in place  Ott Duration (in days):   Central line:    DELICIA: 2/7/2024  Discharge is dependent on: progress  At this point Ms. Hernandez is expected to be discharge to: tbd   **Certification      PHYSICIAN Certification of Need for Inpatient Hospitalization - Initial Certification    Patient will require inpatient services that will reasonably be expected to span two midnight's based on the clinical documentation in H+P.   Based on patients current state of illness, I anticipate that, after discharge, patient will require TBD.    The 21st Century Cures Act makes medical notes like these available to patients in the interest of transparency. Please be advised this is a medical document. Medical documents are intended to carry relevant information, facts as evident, and the clinical opinion of the practitioner. The medical note is intended as peer to peer communication and may appear blunt or direct. It is written in medical language and may contain abbreviations or verbiage that are unfamiliar.

## 2024-02-07 NOTE — PHYSICAL THERAPY NOTE
PHYSICAL THERAPY TREATMENT NOTE - INPATIENT    Room Number: 373/373-A     Session: 1     Number of Visits to Meet Established Goals: 5    Presenting Problem: fall with left femoral neck fracture now s/p left hip hemiarthroplasty  Co-Morbidities : HTN, severe dementia, macular degeneration    ASSESSMENT   Patient demonstrates limited progress this session, goals  remain in progress.    Patient continues to function below baseline with bed mobility and transfers.  Contributing factors to remaining limitations include decreased functional strength, impaired motor planning, difficulty maintaining precautions, and cognitive deficits (poor carryover, alert to self only - unable to recall reasoning for admission or hip precautions).  Next session anticipate patient to progress bed mobility.  Physical Therapy will continue to follow patient for duration of hospitalization.    Patient continues to benefit from continued skilled PT services: to promote return to prior level of function and safety with continuous assistance and gradual rehabilitative therapy .    PLAN  PT Treatment Plan: Bed mobility;Body mechanics;Coordination;Endurance;Patient education;Energy conservation;Family education;Gait training;Neuromuscular re-educate;Range of motion;Strengthening;Stoop training;Stair training;Transfer training;Balance training  Rehab Potential : Fair  Frequency (Obs): 3-5x/week    CURRENT GOALS     Goal #1 Patient is able to demonstrate supine - sit EOB @ level: supervision      Goal #2 Patient is able to demonstrate transfers Sit to/from Stand at assistance level: supervision      Goal #3 Patient is able to ambulate 50 feet with assist device: walker - rolling at assistance level: supervision      Goal #4     Goal #5     Goal #6     Goal Comments: Goals established on 2/5/2024 2/7/2024 all goals ongoing     SUBJECTIVE  \"I am in moderate pain\"    OBJECTIVE  Precautions: NOLA - posterior;Hip abduction pillow;Bed/chair alarm  (getting jose brace)    WEIGHT BEARING RESTRICTION  Weight Bearing Restriction: L lower extremity           L Lower Extremity: Weight Bearing as Tolerated    PAIN ASSESSMENT   Rating:  (moderate)  Location: left hip  Management Techniques: Activity promotion;Body mechanics;Repositioning    BALANCE                                                                                                                       Static Sitting: Fair  Dynamic Sitting: Poor +           Static Standing: Dependent  Dynamic Standing: Dependent    ACTIVITY TOLERANCE                         O2 WALK         AM-PAC '6-Clicks' INPATIENT SHORT FORM - BASIC MOBILITY  How much difficulty does the patient currently have...  Patient Difficulty: Turning over in bed (including adjusting bedclothes, sheets and blankets)?: A Little   Patient Difficulty: Sitting down on and standing up from a chair with arms (e.g., wheelchair, bedside commode, etc.): Unable   Patient Difficulty: Moving from lying on back to sitting on the side of the bed?: Unable   How much help from another person does the patient currently need...   Help from Another: Moving to and from a bed to a chair (including a wheelchair)?: Total   Help from Another: Need to walk in hospital room?: Total   Help from Another: Climbing 3-5 steps with a railing?: Total       AM-PAC Score:  Raw Score: 8   Approx Degree of Impairment: 86.62%   Standardized Score (AM-PAC Scale): 28.58   CMS Modifier (G-Code): CM    FUNCTIONAL ABILITY STATUS  Gait Assessment   Functional Mobility/Gait Assessment  Gait Assistance: Not tested  Distance (ft): 0    Skilled Therapy Provided    Bed Mobility:  Rolling: Max A (repositioning)  Supine<>Sit: NT   Sit<>Supine: NT       Therapist's Comments: Pt placed into chair position - participated in BUE/BLE therex.  Pt's DIL present for education and encouragement.       THERAPEUTIC EXERCISES  Lower Extremity Ankle pumps  Heel slides  LAQ  Quad sets  SAQ  SLR  LLE only -  ankle pumps, heel slides (active assist)     Upper Extremity Elbow flex/ext,  - open/close, OH Reaching, and Shoulder flex/ext     Position Supine     Repetitions   10   Sets   1     Patient End of Session: In bed;Needs met;Call light within reach;RN aware of session/findings;Bracing education provided per handout;All patient questions and concerns addressed;Alarm set;Family present    PT Session Time: 25 minutes  Gait Trainin minutes  Therapeutic Activity: 15 minutes  Therapeutic Exercise: 8 minutes   Neuromuscular Re-education: 0 minutes

## 2024-02-08 ENCOUNTER — APPOINTMENT (OUTPATIENT)
Dept: ULTRASOUND IMAGING | Facility: HOSPITAL | Age: 84
End: 2024-02-08
Attending: INTERNAL MEDICINE
Payer: MEDICARE

## 2024-02-08 ENCOUNTER — APPOINTMENT (OUTPATIENT)
Dept: CV DIAGNOSTICS | Facility: HOSPITAL | Age: 84
End: 2024-02-08
Attending: INTERNAL MEDICINE
Payer: MEDICARE

## 2024-02-08 PROBLEM — I26.99 PULMONARY EMBOLUS (HCC): Status: ACTIVE | Noted: 2024-02-08

## 2024-02-08 LAB
ANION GAP SERPL CALC-SCNC: 4 MMOL/L (ref 0–18)
APTT PPP: 102.1 SECONDS (ref 23.3–35.6)
APTT PPP: 213.7 SECONDS (ref 23.3–35.6)
APTT PPP: 81.5 SECONDS (ref 23.3–35.6)
BASOPHILS # BLD AUTO: 0.03 X10(3) UL (ref 0–0.2)
BASOPHILS # BLD AUTO: 0.04 X10(3) UL (ref 0–0.2)
BASOPHILS NFR BLD AUTO: 0.3 %
BASOPHILS NFR BLD AUTO: 0.4 %
BUN BLD-MCNC: 16 MG/DL (ref 9–23)
CALCIUM BLD-MCNC: 9.7 MG/DL (ref 8.5–10.1)
CHLORIDE SERPL-SCNC: 104 MMOL/L (ref 98–112)
CO2 SERPL-SCNC: 29 MMOL/L (ref 21–32)
CREAT BLD-MCNC: 0.68 MG/DL
EGFRCR SERPLBLD CKD-EPI 2021: 87 ML/MIN/1.73M2 (ref 60–?)
EOSINOPHIL # BLD AUTO: 0.11 X10(3) UL (ref 0–0.7)
EOSINOPHIL # BLD AUTO: 0.14 X10(3) UL (ref 0–0.7)
EOSINOPHIL NFR BLD AUTO: 1.2 %
EOSINOPHIL NFR BLD AUTO: 1.5 %
ERYTHROCYTE [DISTWIDTH] IN BLOOD BY AUTOMATED COUNT: 13.1 %
ERYTHROCYTE [DISTWIDTH] IN BLOOD BY AUTOMATED COUNT: 13.2 %
GLUCOSE BLD-MCNC: 101 MG/DL (ref 70–99)
HCT VFR BLD AUTO: 34 %
HCT VFR BLD AUTO: 39.2 %
HGB BLD-MCNC: 11.1 G/DL
HGB BLD-MCNC: 12 G/DL
IMM GRANULOCYTES # BLD AUTO: 0.08 X10(3) UL (ref 0–1)
IMM GRANULOCYTES # BLD AUTO: 0.08 X10(3) UL (ref 0–1)
IMM GRANULOCYTES NFR BLD: 0.8 %
IMM GRANULOCYTES NFR BLD: 0.9 %
LYMPHOCYTES # BLD AUTO: 1.83 X10(3) UL (ref 1–4)
LYMPHOCYTES # BLD AUTO: 1.86 X10(3) UL (ref 1–4)
LYMPHOCYTES NFR BLD AUTO: 19.2 %
LYMPHOCYTES NFR BLD AUTO: 20.2 %
MCH RBC QN AUTO: 29.6 PG (ref 26–34)
MCH RBC QN AUTO: 29.9 PG (ref 26–34)
MCHC RBC AUTO-ENTMCNC: 30.6 G/DL (ref 31–37)
MCHC RBC AUTO-ENTMCNC: 32.6 G/DL (ref 31–37)
MCV RBC AUTO: 91.6 FL
MCV RBC AUTO: 96.8 FL
MONOCYTES # BLD AUTO: 1.18 X10(3) UL (ref 0.1–1)
MONOCYTES # BLD AUTO: 1.34 X10(3) UL (ref 0.1–1)
MONOCYTES NFR BLD AUTO: 12.4 %
MONOCYTES NFR BLD AUTO: 14.6 %
NEUTROPHILS # BLD AUTO: 5.73 X10 (3) UL (ref 1.5–7.7)
NEUTROPHILS # BLD AUTO: 5.73 X10(3) UL (ref 1.5–7.7)
NEUTROPHILS # BLD AUTO: 6.29 X10 (3) UL (ref 1.5–7.7)
NEUTROPHILS # BLD AUTO: 6.29 X10(3) UL (ref 1.5–7.7)
NEUTROPHILS NFR BLD AUTO: 62.4 %
NEUTROPHILS NFR BLD AUTO: 66.1 %
NT-PROBNP SERPL-MCNC: 516 PG/ML (ref ?–450)
OSMOLALITY SERPL CALC.SUM OF ELEC: 285 MOSM/KG (ref 275–295)
PLATELET # BLD AUTO: 176 10(3)UL (ref 150–450)
PLATELET # BLD AUTO: 201 10(3)UL (ref 150–450)
POTASSIUM SERPL-SCNC: 3.3 MMOL/L (ref 3.5–5.1)
RBC # BLD AUTO: 3.71 X10(6)UL
RBC # BLD AUTO: 4.05 X10(6)UL
SODIUM SERPL-SCNC: 137 MMOL/L (ref 136–145)
TROPONIN I SERPL HS-MCNC: 8 NG/L
WBC # BLD AUTO: 9.2 X10(3) UL (ref 4–11)
WBC # BLD AUTO: 9.5 X10(3) UL (ref 4–11)

## 2024-02-08 PROCEDURE — 99233 SBSQ HOSP IP/OBS HIGH 50: CPT | Performed by: INTERNAL MEDICINE

## 2024-02-08 PROCEDURE — 99232 SBSQ HOSP IP/OBS MODERATE 35: CPT | Performed by: INTERNAL MEDICINE

## 2024-02-08 PROCEDURE — 93306 TTE W/DOPPLER COMPLETE: CPT | Performed by: INTERNAL MEDICINE

## 2024-02-08 PROCEDURE — 93970 EXTREMITY STUDY: CPT | Performed by: INTERNAL MEDICINE

## 2024-02-08 RX ORDER — POTASSIUM CHLORIDE 20 MEQ/1
20 TABLET, EXTENDED RELEASE ORAL ONCE
Status: COMPLETED | OUTPATIENT
Start: 2024-02-08 | End: 2024-02-08

## 2024-02-08 NOTE — OCCUPATIONAL THERAPY NOTE
Attempted to see pt for skilled OT services this date. Pt EZEKIEL for US. RN made aware of attempt. Will follow-up later today as schedule permits.

## 2024-02-08 NOTE — PLAN OF CARE
A/o x self.4L nasal cannula//IS encouraged. Heparin drip infusing. SCD's/ankle pumps encouraged. Regular diet. LBM PTA passing flatus. Voiding without difficulty. Pt takes pills whole one at a time with water. New port brace in place at all times. POC updated with pt and family. All safety measures in place. Call light within reach instructed pt or family to call for help or assistance.

## 2024-02-08 NOTE — PLAN OF CARE
A&Ox1, drowsy. VSS. On 2L via nasal cannula. . Telemetry monitoring - NSR. SCDs on BLE. Heparin gtt continued. Ankle pumps encouraged. Tolerating regular diet. Last BM 2/4. Voiding freely via purewick. Pain controlled. Dressing to left hip, C/D/I. Chairfast, total lift. Plan is to dc to the [facility]. Patient updated on plan of care. Safety precautions in place. Call light within reach.

## 2024-02-08 NOTE — PROGRESS NOTES
[facility] Pulmonary Progress Note    Mar Hernandez Patient Status:  Inpatient    1940 MRN [id number]   Location [facility] 3SW-A Attending Rachel Bearden MD   Hosp Day # 5 PCP Nydia Bauer MD     Subjective:  Mar Hernandez is a(n) 83 year old female who is admitted for broken hip.    Overnight: CT angiogram showed pulmonary embolism    Objective:  /75 (BP Location: Right arm)   Pulse 93   Temp 98.6 °F (37 °C) (Oral)   Resp 16   Ht 5' 4\" (1.626 m)   Wt 178 lb (80.7 kg)   SpO2 90%   BMI 30.55 kg/m²       Temp (24hrs), Av.4 °F (36.9 °C), Min:98 °F (36.7 °C), Max:98.6 °F (37 °C)      Intake/Output:    Intake/Output Summary (Last 24 hours) at 2024 1237  Last data filed at 2024 0942  Gross per 24 hour   Intake 120 ml   Output 200 ml   Net -80 ml       Physical Exam:   General: alert, cooperative, oriented.  No respiratory distress.   Head: Normocephalic, without obvious abnormality, atraumatic.   Throat: Lips, mucosa, and tongue normal.  No thrush noted.   Neck: trachea midline, no adenopathy, no thyromegaly. No JVD.   Lungs: clear to auscultation bilaterally   Chest wall: No tenderness or deformity.   Heart: regular rate and rhythm, S1, S2 normal, no murmur, click, rub or gallop   Abdomen: soft, non-distended, no masses, no guarding, no     Rebound.   Extremity: No edema or cyanosis   Skin: No rashes or lesions.   Neurological: Alert, interactive, no focal deficits    Lab Data Review:  Recent Labs     24  1810 24  0731   WBC 10.8 9.5   HGB 11.9* 11.1*   .0 201.0     Recent Labs     24  1540 24  1810 24  0731   NA  --  138 137   K  --  3.6 3.3*   CL  --  103 104   CO2  --  29.0 29.0   BUN  --  20 16   CREATSERUM 0.71 0.77 0.68     Recent Labs   Lab 24  1648 24  1811 24  0038 24  0731   PTP 13.2  --   --   --    INR 1.00  --   --   --    PTT  --  35.7* 213.7* 102.1*       Cultures:   No results found for this visit on  02/03/24.    Radiology:  US VENOUS DOPPLER LEG BILAT - DIAG IMG (CPT=93970)  Narrative: PROCEDURE:  US VENOUS DOPPLER LEG BILAT - DIAG IMG (CPT=93970)     COMPARISON:  None.     INDICATIONS:  Hypoxia, PE, rule out DVT     TECHNIQUE:  Real time, grey scale, and duplex ultrasound was used to evaluate the lower extremity venous system. B-mode two-dimensional images of the vascular structures, Doppler spectral analysis, and color flow.  Doppler imaging were performed.  The   following veins were imaged bilaterally:  Common, deep, and superficial femoral, popliteal, sapheno-femoral junction, and posterior tibial veins.     PATIENT STATED HISTORY: (As transcribed by Technologist)  Patient offered no additional history at this time.         FINDINGS:    SAPHENOFEMORAL JUNCTION:  No reflux.  THROMBI:  None visible.  COMPRESSION:  Normal compressibility, phasicity, and augmentation.  OTHER:  Negative.                   Impression: CONCLUSION:  No DVT.        LOCATION:  [facility]        Dictated by (CST): Stromberg, LeRoy, MD on 2/08/2024 at 12:01 PM       Finalized by (CST): Stromberg, LeRoy, MD on 2/08/2024 at 12:01 PM         Medications reviewed     Assessment and Plan:   Patient Active Problem List   Diagnosis    Primary hypertension    Acquired hypothyroidism    Intermediate stage nonexudative age-related macular degeneration of both eyes    Severe dementia with agitation ([facility])    Physical deconditioning    Closed fracture of left hip, initial encounter ([facility])    Nausea and vomiting    Atelectasis    Hypoxia    Acute hypoxemic respiratory failure ([facility])       Assessment:  Acute hypoxemic respiratory failure secondary to acute pulmonary embolism in the setting of recent surgery, now down to 2 L  Provoked pulmonary embolism likely due to immobility postsurgery  Left femoral neck fracture status post left hip hemiarthroplasty  Hypertension  Dementia, appears to be at baseline    Plan:  Close monitoring of respiratory status,  wean oxygen as able goal greater than 89%  Continue heparin  Check echo  Check BNP and troponin  Check lower extremity ultrasound  Incentive spirometry  If all is okay, plan to transition to Eliquis or Xarelto by tomorrow  Postop management per surgery        Boris Patel MD  [facility]  Office: [phone]

## 2024-02-09 VITALS
RESPIRATION RATE: 18 BRPM | BODY MASS INDEX: 30.39 KG/M2 | OXYGEN SATURATION: 94 % | TEMPERATURE: 98 F | HEART RATE: 91 BPM | SYSTOLIC BLOOD PRESSURE: 111 MMHG | DIASTOLIC BLOOD PRESSURE: 60 MMHG | HEIGHT: 64 IN | WEIGHT: 178 LBS

## 2024-02-09 LAB
ANION GAP SERPL CALC-SCNC: 3 MMOL/L (ref 0–18)
APTT PPP: 40.2 SECONDS (ref 23.3–35.6)
BASOPHILS # BLD AUTO: 0.04 X10(3) UL (ref 0–0.2)
BASOPHILS # BLD AUTO: 0.05 X10(3) UL (ref 0–0.2)
BASOPHILS NFR BLD AUTO: 0.4 %
BASOPHILS NFR BLD AUTO: 0.5 %
BUN BLD-MCNC: 16 MG/DL (ref 9–23)
CALCIUM BLD-MCNC: 10.1 MG/DL (ref 8.5–10.1)
CHLORIDE SERPL-SCNC: 107 MMOL/L (ref 98–112)
CO2 SERPL-SCNC: 29 MMOL/L (ref 21–32)
CREAT BLD-MCNC: 0.7 MG/DL
EGFRCR SERPLBLD CKD-EPI 2021: 87 ML/MIN/1.73M2 (ref 60–?)
EOSINOPHIL # BLD AUTO: 0.03 X10(3) UL (ref 0–0.7)
EOSINOPHIL # BLD AUTO: 0.18 X10(3) UL (ref 0–0.7)
EOSINOPHIL NFR BLD AUTO: 0.2 %
EOSINOPHIL NFR BLD AUTO: 2 %
ERYTHROCYTE [DISTWIDTH] IN BLOOD BY AUTOMATED COUNT: 12.9 %
ERYTHROCYTE [DISTWIDTH] IN BLOOD BY AUTOMATED COUNT: 13 %
GLUCOSE BLD-MCNC: 107 MG/DL (ref 70–99)
HCT VFR BLD AUTO: 34.2 %
HCT VFR BLD AUTO: 36.1 %
HGB BLD-MCNC: 11.2 G/DL
HGB BLD-MCNC: 11.7 G/DL
IMM GRANULOCYTES # BLD AUTO: 0.13 X10(3) UL (ref 0–1)
IMM GRANULOCYTES # BLD AUTO: 0.21 X10(3) UL (ref 0–1)
IMM GRANULOCYTES NFR BLD: 1.5 %
IMM GRANULOCYTES NFR BLD: 1.7 %
LYMPHOCYTES # BLD AUTO: 1.11 X10(3) UL (ref 1–4)
LYMPHOCYTES # BLD AUTO: 1.74 X10(3) UL (ref 1–4)
LYMPHOCYTES NFR BLD AUTO: 19.6 %
LYMPHOCYTES NFR BLD AUTO: 8.9 %
MCH RBC QN AUTO: 29.9 PG (ref 26–34)
MCH RBC QN AUTO: 30 PG (ref 26–34)
MCHC RBC AUTO-ENTMCNC: 32.4 G/DL (ref 31–37)
MCHC RBC AUTO-ENTMCNC: 32.7 G/DL (ref 31–37)
MCV RBC AUTO: 91.7 FL
MCV RBC AUTO: 92.3 FL
MONOCYTES # BLD AUTO: 1.25 X10(3) UL (ref 0.1–1)
MONOCYTES # BLD AUTO: 1.54 X10(3) UL (ref 0.1–1)
MONOCYTES NFR BLD AUTO: 12.3 %
MONOCYTES NFR BLD AUTO: 14.1 %
NEUTROPHILS # BLD AUTO: 5.53 X10 (3) UL (ref 1.5–7.7)
NEUTROPHILS # BLD AUTO: 5.53 X10(3) UL (ref 1.5–7.7)
NEUTROPHILS # BLD AUTO: 9.53 X10 (3) UL (ref 1.5–7.7)
NEUTROPHILS # BLD AUTO: 9.53 X10(3) UL (ref 1.5–7.7)
NEUTROPHILS NFR BLD AUTO: 62.3 %
NEUTROPHILS NFR BLD AUTO: 76.5 %
OSMOLALITY SERPL CALC.SUM OF ELEC: 290 MOSM/KG (ref 275–295)
PLATELET # BLD AUTO: 238 10(3)UL (ref 150–450)
PLATELET # BLD AUTO: 286 10(3)UL (ref 150–450)
POTASSIUM SERPL-SCNC: 3.5 MMOL/L (ref 3.5–5.1)
RBC # BLD AUTO: 3.73 X10(6)UL
RBC # BLD AUTO: 3.91 X10(6)UL
SODIUM SERPL-SCNC: 139 MMOL/L (ref 136–145)
WBC # BLD AUTO: 12.5 X10(3) UL (ref 4–11)
WBC # BLD AUTO: 8.9 X10(3) UL (ref 4–11)

## 2024-02-09 PROCEDURE — 99239 HOSP IP/OBS DSCHRG MGMT >30: CPT | Performed by: INTERNAL MEDICINE

## 2024-02-09 PROCEDURE — 99232 SBSQ HOSP IP/OBS MODERATE 35: CPT | Performed by: INTERNAL MEDICINE

## 2024-02-09 RX ORDER — LISINOPRIL 5 MG/1
5 TABLET ORAL DAILY
Qty: 30 TABLET | Refills: 0 | Status: SHIPPED | OUTPATIENT
Start: 2024-02-10 | End: 2024-03-11

## 2024-02-09 RX ORDER — LISINOPRIL 5 MG/1
5 TABLET ORAL DAILY
Status: DISCONTINUED | OUTPATIENT
Start: 2024-02-10 | End: 2024-02-09

## 2024-02-09 NOTE — PROGRESS NOTES
[facility] Pulmonary Progress Note    Mar Hernandez Patient Status:  Inpatient    1940 MRN [id number]   [facility] 3SW-A Attending Rachel Bearden MD   Hosp Day # 6 PCP Nydia Bauer MD     Subjective:  Mar Hernandez is a(n) 83 year old female who is admitted for broken hip.    Overnight: reports no breathing complaints today, off oxygen    Objective:  BP (!) 79/56   Pulse 96   Temp 97.5 °F (36.4 °C) (Oral)   Resp 18   Ht 5' 4\" (1.626 m)   Wt 178 lb (80.7 kg)   SpO2 96%   BMI 30.55 kg/m²       Temp (24hrs), Av °F (36.7 °C), Min:97.1 °F (36.2 °C), Max:98.8 °F (37.1 °C)      Intake/Output:    Intake/Output Summary (Last 24 hours) at 2024 1219  Last data filed at 2024 0400  Gross per 24 hour   Intake 120 ml   Output 700 ml   Net -580 ml       Physical Exam:   General: alert, cooperative, oriented.  No respiratory distress.   Head: Normocephalic, without obvious abnormality, atraumatic.   Throat: Lips, mucosa, and tongue normal.  No thrush noted.   Neck: trachea midline, no adenopathy, no thyromegaly. No JVD.   Lungs: clear to auscultation bilaterally   Chest wall: No tenderness or deformity.   Heart: regular rate and rhythm, S1, S2 normal, no murmur, click, rub or gallop   Abdomen: soft, non-distended, no masses, no guarding, no     Rebound.   Extremity: No edema or cyanosis   Skin: No rashes or lesions.   Neurological: Alert, interactive, no focal deficits    Lab Data Review:  Recent Labs     24  1810 24  0731   WBC 10.8 9.5   HGB 11.9* 11.1*   .0 201.0     Recent Labs     24  1540 24  1810 24  0731   NA  --  138 137   K  --  3.6 3.3*   CL  --  103 104   CO2  --  29.0 29.0   BUN  --  20 16   CREATSERUM 0.71 0.77 0.68     Recent Labs   Lab 24  1648 24  1811 24  0731 24  1421 24  0506   PTP 13.2  --   --   --   --    INR 1.00  --   --   --   --    PTT  --    < > 102.1* 81.5* 40.2*    < > = values in this interval  not displayed.       Cultures:   No results found for this visit on 24.    Radiology:  CARD ECHO 2D DOPPLER (CPT=93306)  Transthoracic Echocardiogram    Name:Mar Hernandez    Date: 2024 :  1940 Ht:  (64in)  BP: 113 / 62  MRN:  [id number]    Age:  83years    Wt:  (178lb) HR: 80bpm  Loc:  [facility]       Gndr: F          BSA: 1.86m^2  Sonographer: Tamiko BOWER    Ordering:    Boris Patel    ----------------------------------------------------------------------------  History/Indications:  Pulmonary Embolism. Evaluate for right ventricle  strain. Hypertenson. Hypoxia.    ----------------------------------------------------------------------------  Procedure information:  A transthoracic complete 2D study was performed.  Additional evaluation included M-mode, complete spectral Doppler, and color  Doppler.  Patient status:  Inpatient.  Location:  Bedside.    This was a  routine study. Transthoracic echocardiography for ventricular function  evaluation and assessment of valvular function. Image quality was adequate.    ECG rhythm:   Normal sinus    ----------------------------------------------------------------------------    Conclusions:    1. Left ventricle: The cavity size was normal. Wall thickness was normal.     Systolic function was vigorous. The estimated ejection fraction was     70-75%, by visual assessment. No diagnostic evidence for regional wall     motion abnormalities. Left ventricular diastolic function parameters were     normal for the patient's age.  2. Right ventricle: Systolic function was normal.  3. Left atrium: The left atrial volume was normal.  4. Pulmonary arteries: Systolic pressure could not be accurately estimated.  Impressions:  No previous study was available for comparison.  *    ----------------------------------------------------------------------------  *  Findings:  Left ventricle:  The cavity size was normal. Wall thickness was normal.  Systolic function was  vigorous. The estimated ejection fraction was 70-75%,  by visual assessment. No diagnostic evidence for regional wall motion  abnormalities. Left ventricular diastolic function parameters were normal  for the patient's age.  Left atrium:  The left atrial volume was normal.  Right ventricle:  The cavity size was normal. Systolic function was normal.  Right atrium:  The atrium was normal in size.  Mitral valve:  The valve was structurally normal. Leaflet separation was  normal.  Doppler:  Transvalvular velocity was within the normal range. There  was no evidence for stenosis. There was no significant regurgitation.  Aortic valve:  The valve was structurally normal. The valve was trileaflet.  Cusp separation was normal.  Doppler:  Transvalvular velocity was within the  normal range. There was no evidence for stenosis. There was no significant  regurgitation.  Tricuspid valve:  The valve is structurally normal. Leaflet separation was  normal.  Doppler:  Transvalvular velocity was within the normal range. There  was no evidence for stenosis. There was no significant regurgitation.  Pulmonic valve:   Not well visualized. The valve is structurally normal.  Cusp separation was normal.  Doppler:  Transvalvular velocity was within the  normal range. There was no evidence for stenosis. There was no significant  regurgitation.  Pericardium:  A trivial pericardial effusion was identified.  Aorta:  Aortic root: The aortic root was normal.  Ascending aorta: The ascending aorta was normal.  Pulmonary arteries:  Systolic pressure could not be accurately estimated.    Systemic veins:  Central venous respirophasic diameter changes are in the  normal range (>50%).  Inferior vena cava: The IVC was normal-sized.    ----------------------------------------------------------------------------  Measurements     Left ventricle                  Value        Ref   IVS thickness, ED, PLAX         0.7   cm     0.6 - 0.9   LV ID, ED, PLAX                  4.6   cm     3.8 - 5.2   LV ID, ES, PLAX                 2.6   cm     2.2 - 3.5   LV PW thickness, ED, PLAX       0.7   cm     0.6 - 0.9   IVS/LV PW ratio, ED, PLAX       1.03         ---------   LV PW/LV ID ratio, ED, PLAX     0.15         ---------   LV ejection fraction            74    %      54 - 74   LV e', lateral              (L) 8.3   cm/sec >=10.0   LV E/e', lateral                9            <=13   LV e', medial               (L) 6.9   cm/sec >=7.0   LV E/e', medial                 10           ---------   LV e', average                  7.6   cm/sec ---------   LV E/e', average                9            <=14     Aortic root                     Value        Ref   Aortic root ID, ED              3.6   cm     2.6 - 4.0     Ascending aorta                 Value        Ref   Ascending aorta ID, A-P, ED     3.0   cm     1.9 - 3.5     Left atrium                     Value        Ref   LA ID, A-P, ES              (L) 2.4   cm     2.7 - 3.8   LA volume, S                    28    ml     22 - 52   LA volume/bsa, S            (L) 15    ml/m^2 16 - 34   LA volume, ES, 1-p A4C          29    ml     22 - 52   LA volume, ES, 1-p A2C          26    ml     22 - 52   LA volume, ES, A/L              29    ml     ---------   LA volume/bsa, ES, A/L          16    ml/m^2 16 - 34   LA/aortic root ratio            0.67         ---------     Mitral valve                    Value        Ref   Mitral E-wave peak velocity     0.7   m/sec  ---------   Mitral A-wave peak velocity     0.94  m/sec  ---------   Mitral E/A ratio, peak          0.8          ---------     Systemic veins                  Value        Ref   Estimated CVP                   3     mm Hg  ---------  Legend:  (L)  and  (H)  leonie values outside specified reference range.    ----------------------------------------------------------------------------    Prepared and electronically signed by  Bj Ferrari  02/08/2024 16:31  US VENOUS DOPPLER LEG  BILAT - DIAG IMG (CPT=93970)  Narrative: PROCEDURE:  US VENOUS DOPPLER LEG BILAT - DIAG IMG (CPT=93970)     COMPARISON:  None.     INDICATIONS:  Hypoxia, PE, rule out DVT     TECHNIQUE:  Real time, grey scale, and duplex ultrasound was used to evaluate the lower extremity venous system. B-mode two-dimensional images of the vascular structures, Doppler spectral analysis, and color flow.  Doppler imaging were performed.  The   following veins were imaged bilaterally:  Common, deep, and superficial femoral, popliteal, sapheno-femoral junction, and posterior tibial veins.     PATIENT STATED HISTORY: (As transcribed by Technologist)  Patient offered no additional history at this time.         FINDINGS:    SAPHENOFEMORAL JUNCTION:  No reflux.  THROMBI:  None visible.  COMPRESSION:  Normal compressibility, phasicity, and augmentation.  OTHER:  Negative.                   Impression: CONCLUSION:  No DVT.        LOCATION:  [facility]        Dictated by (CST): Stromberg, LeRoy, MD on 2/08/2024 at 12:01 PM       Finalized by (CST): Stromberg, LeRoy, MD on 2/08/2024 at 12:01 PM         Medications reviewed     Assessment and Plan:   Patient Active Problem List   Diagnosis    Primary hypertension    Acquired hypothyroidism    Intermediate stage nonexudative age-related macular degeneration of both eyes    Severe dementia with agitation ([facility])    Physical deconditioning    Closed fracture of left hip, initial encounter ([facility])    Nausea and vomiting    Atelectasis    Hypoxia    Acute hypoxemic respiratory failure ([facility])    Pulmonary embolus ([facility])       Assessment:  Acute hypoxemic respiratory failure secondary to acute pulmonary embolism in the setting of recent surgery, resolved on room air now  Provoked pulmonary embolism likely due to immobility postsurgery  Left femoral neck fracture status post left hip hemiarthroplasty  Hypertension  Dementia, appears to be at baseline    Plan:  Close monitoring of respiratory status, wean oxygen  as able goal greater than 89%, now on room air  Transitioned from heparin to eliquis today   Will need 3-6 months of anticoagulation  Postop management per surgery  Encouraged to use IS  Ok for discharge from pulmonary perspective will follow while in hospital        Boris Patel MD  [facility]  Office: [phone]

## 2024-02-09 NOTE — PROGRESS NOTES
[facility] Ortho Progress Note    Subjective: Sitting upright on supplemental O2, pain controlled. A&O x 2    Objective: Dressing on left hip C/D/I. [place] hip brace in place    Moving digits distally.       Imaging: Post op XR personally viewed, independently interpreted and radiology report read. Well appearing fixation of left hip hemiarthroplasty.       Labs: WNL        Assessment/Plan: 83 year old female postop day #4 status post L hip hemiarthroplasty.    Development of Pulmonary embolism - being treated with pulmonary team.     Activity: WBAT, Posterior hip precautions. [place] brace x 4 weeks.   Anticoagulation: Okay for full anticoagulation. (Eliquis or Xarelto)   Analgesia: Per primary  Antibiotics: Complete  Disposition: [facility], outpatient followup with Zhour Abram in 4 weeks.         Galo Heck MD  Knee, Shoulder, & Elbow Surgery / Sports Medicine Specialist  Orthopaedic Surgery  [address] [place] [address] [id number] [place] [address].org  [email or web address]  t: [phone]  o: [phone]  f: [phone]

## 2024-02-09 NOTE — OCCUPATIONAL THERAPY NOTE
OCCUPATIONAL THERAPY TREATMENT NOTE - INPATIENT     Room Number: 373/373-A  Session: 1   Number of Visits to Meet Established Goals: 5    Presenting Problem: L hip fx, s/p L hip bipolar hemiarthroplasty 2/4/24      ASSESSMENT   Patient demonstrates limited progress this session, goals remain in progress.    Pt with positive PE per EMR - already started on Eliquis 2/8 per RN, and cleared for OOB mobility.     Patient continues to function below baseline with toileting, lower body dressing, bed mobility, and transfers.   Contributing factors to remaining limitations include decreased functional strength, pain, impaired motor planning, difficulty maintaining precautions, cognitive deficits (poor carryover to knowledge of hip precautions, alert to self), decreased insight to deficits, and decreased safety awareness.  Next session anticipate patient to progress bed mobility and transfers.  Occupational Therapy will continue to follow patient for duration of hospitalization.    Patient continues to benefit from continued skilled OT services: to promote return to prior level of function and safety with continuous assistance and gradual rehabilitative therapy .       OT Discharge Recommendations (Read-only): Sub-acute rehabilitation    History: Patient is a 83 year old female admitted on 2/3/2024 with Presenting Problem: L hip fx, s/p L hip bipolar hemiarthroplasty 2/4/24. Brain CT, L knee xray negative. Chest xray shows L atelectasis vs PNA. Co-Morbidities : HTN, severe dementia, macular degeneration    WEIGHT BEARING RESTRICTION  Weight Bearing Restriction: L lower extremity           L Lower Extremity: Weight Bearing as Tolerated      TREATMENT SESSION:  Patient Start of Session: seated in chair; family present  FUNCTIONAL TRANSFER ASSESSMENT  Sit to Stand: Edge of Bed  Edge of Bed: Not Tested    BED MOBILITY  Supine to Sit : Not tested  Sit to Supine (OT): Not Tested    BALANCE ASSESSMENT  Static Sitting: Not  Tested    FUNCTIONAL ADL ASSESSMENT  LB Dressing Seated: Not Tested      ACTIVITY TOLERANCE: poor/fair                         O2 SATURATIONS       EDUCATION PROVIDED  Patient: Role of Occupational Therapy; Plan of Care; Discharge Recommendations; Weight Bear Status; Surgical Precautions  Patient's Response to Education: Demonstrates Poor Carry Over to Information; Does Not Demonstrate Skills Needed for Learning  Family/Caregiver: Role of Occupational Therapy; Plan of Care; Functional Transfer Techniques; Fall Prevention; Weight Bear Status; Surgical Precautions  Family/Caregiver's Response to Education: Verbalized Understanding; Requires Further Education (daughter-in-law present)    THERAPEUTIC EXERCISES  Lower Extremity      Upper Extremity Shoulder raises  Forward punches  Elbow flexion/extension  Hand pumps    Forward seated crunches while adhering to hip precautions     Position Sitting in chair/semi reclined     Repetitions 10   Sets 2       Therapist comments: Pt provided with UE exercises and core strengthening exercises (while adhering to hip precautions), to increase strength, ROM, and activity tolerance required for increased ADL performance, functional transfers, and UE function.   Patient End of Session: Up in chair;Needs met;Call light within reach;RN aware of session/findings;All patient questions and concerns addressed;Alarm set;Family present    SUBJECTIVE  \"Don't touch me.\"    PAIN ASSESSMENT  Rating: Unable to rate  Location: L hip  Management Techniques: Activity promotion;Body mechanics;Breathing techniques;Relaxation;Repositioning;Nurse notified     OBJECTIVE  Precautions: NOLA - posterior;Hip abduction pillow;Bed/chair alarm (getting jose brace)    AM-PAC ‘6-Clicks’ Inpatient Daily Activity Short Form  -   Putting on and taking off regular lower body clothing?: Total  -   Bathing (including washing, rinsing, drying)?: A Lot  -   Toileting, which includes using toilet, bedpan or urinal? :  Total  -   Putting on and taking off regular upper body clothing?: Total  -   Taking care of personal grooming such as brushing teeth?: A Lot  -   Eating meals?: A Little    AM-PAC Score:  Score: 10  Approx Degree of Impairment: 74.7%  Standardized Score (AM-PAC Scale): 27.31    PLAN  OT Treatment Plan: Balance activities;ADL training;Functional transfer training;Endurance training;Patient/Family education;Patient/Family training;Compensatory technique education  Rehab Potential : Fair  Frequency: 3x/week    OT Goals:     All goals ongoing 02/09    ADL GOALS   Patient will perform Toileting with mod assist     FUNCTIONAL TRANSFER GOALS   Patient will transfer Supine to Sit with mod assist  Patient will transfer Sit to Supine with mod assist  Patient will transfer to Commode with mod assist     ADDITIONAL GOALS   Patient will follow cues for hip precautions with mod cueing    OT Session Time: 25 minutes  Therapeutic Exercise: 25 minutes

## 2024-02-09 NOTE — PHYSICAL THERAPY NOTE
PHYSICAL THERAPY TREATMENT NOTE - INPATIENT    Room Number: 373/373-A     Session: 2    Number of Visits to Meet Established Goals: 5    Presenting Problem: fall with left femoral neck fracture now s/p left hip hemiarthroplasty  Co-Morbidities : HTN, severe dementia, macular degeneration    ASSESSMENT   Patient demonstrates fair progress this session, goals  remain in progress.    Pt remains up to chair with use of Lacey sit<>stand lift, and two person assist.  Pt with positive PE per EMR - already started on Eliquis 2/8 per RN, and cleared for OOB mobility.     Patient continues to function below baseline with bed mobility and transfers.  Contributing factors to remaining limitations include decreased functional strength, impaired motor planning, difficulty maintaining precautions, and cognitive deficits (poor carryover, alert to self only - unable to recall reasoning for admission or hip precautions).  Next session anticipate patient to progress bed mobility.  Physical Therapy will continue to follow patient for duration of hospitalization.    Patient continues to benefit from continued skilled PT services: to promote return to prior level of function and safety with continuous assistance and gradual rehabilitative therapy .    PLAN  PT Treatment Plan: Bed mobility;Body mechanics;Coordination;Endurance;Patient education;Energy conservation;Family education;Gait training;Neuromuscular re-educate;Range of motion;Strengthening;Stoop training;Stair training;Transfer training;Balance training  Rehab Potential : Fair  Frequency (Obs): 3-5x/week    CURRENT GOALS     Goal #1 Patient is able to demonstrate supine - sit EOB @ level: supervision      Goal #2 Patient is able to demonstrate transfers Sit to/from Stand at assistance level: supervision      Goal #3 Patient is able to ambulate 50 feet with assist device: walker - rolling at assistance level: supervision      Goal #4     Goal #5     Goal #6     Goal Comments:  Goals established on 2024 all goals ongoing     SUBJECTIVE  \"Dont you touch my leg\" - while laughing    OBJECTIVE  Precautions: NOLA - posterior;Hip abduction pillow;Bed/chair alarm (getting jose brace)    WEIGHT BEARING RESTRICTION  Weight Bearing Restriction: L lower extremity           L Lower Extremity: Weight Bearing as Tolerated    PAIN ASSESSMENT   Ratin  Location: left hip  Management Techniques: Activity promotion;Body mechanics;Repositioning    BALANCE                                                                                                                       Static Sitting: Fair  Dynamic Sitting: Poor +           Static Standing: Dependent  Dynamic Standing: Dependent    ACTIVITY TOLERANCE                         O2 WALK         AM-PAC '6-Clicks' INPATIENT SHORT FORM - BASIC MOBILITY  How much difficulty does the patient currently have...  Patient Difficulty: Turning over in bed (including adjusting bedclothes, sheets and blankets)?: A Lot   Patient Difficulty: Sitting down on and standing up from a chair with arms (e.g., wheelchair, bedside commode, etc.): Unable   Patient Difficulty: Moving from lying on back to sitting on the side of the bed?: Unable   How much help from another person does the patient currently need...   Help from Another: Moving to and from a bed to a chair (including a wheelchair)?: Total   Help from Another: Need to walk in hospital room?: Total   Help from Another: Climbing 3-5 steps with a railing?: Total       AM-PAC Score:  Raw Score: 7   Approx Degree of Impairment: 92.36%   Standardized Score (AM-PAC Scale): 26.42   CMS Modifier (G-Code): CM    FUNCTIONAL ABILITY STATUS  Gait Assessment   Functional Mobility/Gait Assessment  Gait Assistance: Not tested  Distance (ft): 0    Skilled Therapy Provided    Bed Mobility:  Rolling: Max A (repositioning)  Supine<>Sit: Max A x 2   Sit<>Supine: NT       Therapist's Comments: Pt placed into chair position -  participated in BLE therex.  Pt up to chair via lift - left with breakfast tray, and DIL to assist.           THERAPEUTIC EXERCISES  Lower Extremity Ankle pumps  Heel slides  LAQ  Quad sets  SAQ  SLR  LLE only - ankle pumps, heel slides (active assist)     Upper Extremity none     Position Supine     Repetitions   10   Sets   1     Patient End of Session: Up in chair;Call light within reach;RN aware of session/findings;Needs met;Bracing education provided per handout;All patient questions and concerns addressed;Alarm set;Family present    PT Session Time: 25 minutes  Gait Trainin minutes  Therapeutic Activity: 15 minutes  Therapeutic Exercise: 8 minutes   Neuromuscular Re-education: 0 minutes

## 2024-02-09 NOTE — PLAN OF CARE
A&Ox1, POD#5, dressing C/D/I, brace on. Hypotensive this morning, MD aware, see orders and flowsheets. Plan to DC to [facility] when medically cleared.

## 2024-02-09 NOTE — PROGRESS NOTES
[facility]     Hospitalist Progress Note     Mar Hernandez Patient Status:  Inpatient    1940 MRN [id number]   [facility] 3SW-A Attending Stiven Vicente MD   Hosp Day # 6 PCP Nydia Bauer MD     Chief Complaint: fall     Subjective:     Patient seen with patient's daughter at bedside.  Patient feels better today, sitting up.  More cheerful. off Oxygen . Patient with dementia and pleasantly confused.  Afebrile.      Objective:    Review of Systems:   A comprehensive review of systems was completed; pertinent positive and negatives stated in subjective.  Vital signs:  Temp:  [97.1 °F (36.2 °C)-98.8 °F (37.1 °C)] 97.4 °F (36.3 °C)  Pulse:  [77-93] 93  Resp:  [16-18] 16  BP: (107-135)/(62-84) 120/84  SpO2:  [91 %-95 %] 93 %  Physical Exam:    /84 (BP Location: Left arm)   Pulse 93   Temp 97.4 °F (36.3 °C) (Oral)   Resp 16   Ht 5' 4\" (1.626 m)   Wt 178 lb (80.7 kg)   SpO2 93%   BMI 30.55 kg/m²   On room air  General: No acute distress   Respiratory: Clear to auscultation bilateral, no wheezes, no rhonchi  Cardiovascular: S1, S2, RRR  Abdomen: Soft, NT/ND, +BS  Extremities: no pedal edema, left hip post op fixation of left hip hemiarthroplasty and brace-Ortho following      Diagnostic Data:    Labs:  Recent Labs   Lab 24  1648 24  0628 24  0524 24  1810 24  0731 24  1932 24  0506   WBC 14.9* 10.3  --  10.8 9.5 9.2 8.9   HGB 14.9 13.6 12.1 11.9* 11.1* 12.0 11.7*   MCV 90.6 94.0  --  92.2 91.6 96.8 92.3   .0 175.0  --  192.0 201.0 176.0 238.0   INR 1.00  --   --   --   --   --   --      Recent Labs   Lab 24  1648 24  0628 24  1810 24  0731 24  0506   *   < > 129* 101* 107*   BUN 15   < > 20 16 16   CREATSERUM 0.99   < > 0.77 0.68 0.70   CA 9.7   < > 10.3* 9.7 10.1   ALB 3.7  --   --   --   --       < > 138 137 139   K 3.8   < > 3.6 3.3* 3.5      < > 103 104 107   CO2 26.0   < > 29.0  29.0 29.0   ALKPHO 113  --   --   --   --    AST 20  --   --   --   --    ALT 30  --   --   --   --    BILT 0.4  --   --   --   --    TP 7.1  --   --   --   --     < > = values in this interval not displayed.     Estimated Creatinine Clearance: 54.4 mL/min (based on SCr of 0.7 mg/dL).  Recent Labs   Lab 02/03/24  1648   PTP 13.2   INR 1.00        Microbiology  No results found for this visit on 02/03/24.  Imaging: Reviewed in Epic.  Medications:    apixaban  10 mg Oral BID    Followed by    [START ON 2/15/2024] apixaban  5 mg Oral BID    sennosides  17.2 mg Oral Nightly    docusate sodium  100 mg Oral BID    multivitamin  1 tablet Oral Daily    donepezil  10 mg Oral Daily    FLUoxetine  10 mg Oral Daily    levothyroxine  75 mcg Oral Before breakfast    lisinopril  20 mg Oral Daily    QUEtiapine  25 mg Oral Nightly    atorvastatin  10 mg Oral Nightly    traZODone  50 mg Oral Nightly       Assessment & Plan:      #Left femoral neck fracture  #Mechanical fall  -Orthopedic surgery following  -s/p L hip hemiarthroplasty and brace per Ortho  -On aspirin 81 mg twice daily when ambulatory, subcutaneous heparin until that point in time per Ortho for DVT prophylaxis  -Pain control, IV fluids    #Hypoxia multifactorial, atelectasis contributing  - patient with advanced dementia- unable to be complaint with IS, not moving much.  -suspect atelectasis    - CXR showed atelectasis-patient on incentive spirometry  -Status post neb treatment and Lasix with no improvement  -D-dimer done which came back elevated, will check CTA chest rule out PE also     #Leukocytosis- reactive and resolved     # Transient n/v- no abd pain-resolved.  Tolerating diet.    # Hypokalemia  -Replace with electrolyte protocol     #Advanced Dementia with history of periodic agitation-currently calm and pleasantly confused  -Patient at baseline   -Has history of sundowning- monitor   -Continue home Aricept, trazodone and Seroquel  -Continue home medications at  the time of discharge     #Essential hypertension  -Continue home lisinopril     #Hyperlipidemia  -Continue home statin     #Hypothyroidism  -Continue home levothyroxine     # History of prior skin cancer     #Obesity-BMI 30.65    # Acute pulmonary embolism  -was on heparin drip and if no signs of bleeding ,switched to oral DOAC   -Tolerating heparin drip, hemoglobin relatively stable.  No signs of any bleeding and no ecchymosis at the left hip post surgical site.  -hb stable  -Bilateral lower extremity venous duplex t negative for DVT    Discharge planning to subacute rehab on Eliquis today, d/w rn,sw. D/w family.  F/u with ortho as directed    Rachel Bearden MD      Supplementary Documentation:     Quality:  DVT Mechanical Prophylaxis:   SCDs, Early ambuation  DVT Pharmacologic Prophylaxis   Medication    apixaban (Eliquis) tab 10 mg    In followed-by linked group with    [START ON 2/15/2024] apixaban (Eliquis) tab 5 mg                Code Status: DNAR/Selective Treatment  Ott: External urinary catheter in place  Ott Duration (in days):   Central line:    DELICIA: 2/9/2024  Discharge is dependent on: progress  At this point Ms. Hernandez is expected to be discharge to: [facility]  **Certification      PHYSICIAN Certification of Need for Inpatient Hospitalization - Initial Certification    Patient will require inpatient services that will reasonably be expected to span two midnight's based on the clinical documentation in H+P.   Based on patients current state of illness, I anticipate that, after discharge, patient will require TBD.    The 21st Century Cures Act makes medical notes like these available to patients in the interest of transparency. Please be advised this is a medical document. Medical documents are intended to carry relevant information, facts as evident, and the clinical opinion of the practitioner. The medical note is intended as peer to peer communication and may appear blunt or direct. It is written in  medical language and may contain abbreviations or verbiage that are unfamiliar.

## 2024-02-09 NOTE — DISCHARGE INSTRUCTIONS
[place] hip brace, left side to maintain posterior hip precautions.      No hip flexion greater than 90 degrees.  No leg crossing.  No inward rotation of affected leg.  Legs abducted at all times with abductor pillow.           CBC tomorrow at [facility] and physician at [facility] to follow  Slow change in position for possible orthostatic hypotension  Fall precautions at [facility] nursing facility  Encourage oral fluids and help with meals at [facility]

## 2024-02-09 NOTE — PROGRESS NOTES
[facility]     Hospitalist Progress Note     Mar Hernandez Patient Status:  Inpatient    1940 MRN [id number]   Location [facility] 3SW-A Attending Stiven Vicente MD   Hosp Day # 5 PCP Nydia Bauer MD     Chief Complaint: fall     Subjective:     Patient seen with patient's daughter at bedside.  Patient feels better today.  More cheerful.  Oxygen requirement improving to 2 L by nasal cannula.  On heparin drip for PE.  Patient with dementia and pleasantly confused.  Afebrile.  Has incentive spirometry.  Taking deep breathing on prompting.    Objective:    Review of Systems:   A comprehensive review of systems was completed; pertinent positive and negatives stated in subjective.  Vital signs:  Temp:  [97.1 °F (36.2 °C)-98.6 °F (37 °C)] 97.1 °F (36.2 °C)  Pulse:  [73-93] 79  Resp:  [16-18] 16  BP: (107-122)/(62-75) 113/72  SpO2:  [86 %-94 %] 94 %  Physical Exam:    /72 (BP Location: Left arm)   Pulse 79   Temp 97.1 °F (36.2 °C) (Oral)   Resp 16   Ht 5' 4\" (1.626 m)   Wt 178 lb (80.7 kg)   SpO2 94%   BMI 30.55 kg/m²     General: No acute distress   Respiratory: Clear to auscultation bilateral, no wheezes, no rhonchi  Cardiovascular: S1, S2, RRR  Abdomen: Soft, NT/ND, +BS  Extremities: no pedal edema, left hip post op fixation of left hip hemiarthroplasty and brace-Ortho following      Diagnostic Data:    Labs:  Recent Labs   Lab 24  0628 24  0524 24  1810 24  0731   WBC 14.9* 10.3  --  10.8 9.5   HGB 14.9 13.6 12.1 11.9* 11.1*   MCV 90.6 94.0  --  92.2 91.6   .0 175.0  --  192.0 201.0   INR 1.00  --   --   --   --      Recent Labs   Lab 24  0628 24  1540 24  1810 24  0731   * 106*  --  129* 101*   BUN 15 14  --  20 16   CREATSERUM 0.99 0.90 0.71 0.77 0.68   CA 9.7 9.2  --  10.3* 9.7   ALB 3.7  --   --   --   --     142  --  138 137   K 3.8 3.9  --  3.6 3.3*    111  --  103 104   CO2  26.0 28.0  --  29.0 29.0   ALKPHO 113  --   --   --   --    AST 20  --   --   --   --    ALT 30  --   --   --   --    BILT 0.4  --   --   --   --    TP 7.1  --   --   --   --      Estimated Creatinine Clearance: 56 mL/min (based on SCr of 0.68 mg/dL).  Recent Labs   Lab 02/03/24  1648   PTP 13.2   INR 1.00        Microbiology  No results found for this visit on 02/03/24.  Imaging: Reviewed in Epic.  Medications:    sennosides  17.2 mg Oral Nightly    docusate sodium  100 mg Oral BID    multivitamin  1 tablet Oral Daily    aspirin  81 mg Oral BID    donepezil  10 mg Oral Daily    FLUoxetine  10 mg Oral Daily    levothyroxine  75 mcg Oral Before breakfast    lisinopril  20 mg Oral Daily    QUEtiapine  25 mg Oral Nightly    atorvastatin  10 mg Oral Nightly    traZODone  50 mg Oral Nightly       Assessment & Plan:      #Left femoral neck fracture  #Mechanical fall  -Orthopedic surgery following  -s/p L hip hemiarthroplasty and brace per Ortho  -On aspirin 81 mg twice daily when ambulatory, subcutaneous heparin until that point in time per Ortho for DVT prophylaxis  -Pain control, IV fluids    #Hypoxia multifactorial, atelectasis contributing  - patient with advanced dementia- unable to be complaint with IS, not moving much.  -suspect atelectasis    - CXR showed atelectasis-patient on incentive spirometry  -Status post neb treatment and Lasix with no improvement  -D-dimer done which came back elevated, will check CTA chest rule out PE also     #Leukocytosis- reactive and resolved     # Transient n/v- no abd pain-resolved.  Tolerating diet.    # Hypokalemia  -Replace with electrolyte protocol     #Advanced Dementia with history of periodic agitation-currently calm and pleasantly confused  -Patient at baseline   -Has history of sundowning- monitor   -Continue home Aricept, trazodone and Seroquel  -Continue home medications at the time of discharge     #Essential hypertension  -Continue home lisinopril      #Hyperlipidemia  -Continue home statin     #Hypothyroidism  -Continue home levothyroxine     # History of prior skin cancer     #Obesity-BMI 30.65    # Acute pulmonary embolism  -on heparin drip and if no signs of bleeding then will switch to oral DOAC   -Tolerating heparin drip, hemoglobin relatively stable.  No signs of any bleeding and no ecchymosis at the left hip post surgical site.  -Follow CBC in a.m.  -Bilateral lower extremity venous duplex t negative for DVT    Discharge planning to subacute rehab on [facility] tomorrow    Rachel Bearden MD      Supplementary Documentation:     Quality:  DVT Mechanical Prophylaxis:   SCDs, Early ambuation  DVT Pharmacologic Prophylaxis   Medication    heparin (Porcine) 10678 units/250mL infusion PE/DVT/THROMBUS CONTINUOUS         DVT Pharmacologic prophylaxis: Aspirin 81 mg      Code Status: DNAR/Selective Treatment  Ott: External urinary catheter in place  Ott Duration (in days):   Central line:    DELICIA: 2/7/2024  Discharge is dependent on: progress  At this point Ms. Hernandez is expected to be discharge to: tbd   **Certification      PHYSICIAN Certification of Need for Inpatient Hospitalization - Initial Certification    Patient will require inpatient services that will reasonably be expected to span two midnight's based on the clinical documentation in H+P.   Based on patients current state of illness, I anticipate that, after discharge, patient will require TBD.    The 21st Century Cures Act makes medical notes like these available to patients in the interest of transparency. Please be advised this is a medical document. Medical documents are intended to carry relevant information, facts as evident, and the clinical opinion of the practitioner. The medical note is intended as peer to peer communication and may appear blunt or direct. It is written in medical language and may contain abbreviations or verbiage that are unfamiliar.

## 2024-02-09 NOTE — PLAN OF CARE
A&O x1 baseline. VSS, 2L O2 nc overnight. . Pain controlled with tylenol PRN. Jeffrey lift to get up. [place] brace in place. Voids freely via purewick. Bilateral SCDs. Telfa and tegaderm dressing C/D/I. Hep gtt stopped tonight, eliquis started. Takes pills whole one at a time with water. Reviewed POC, pain management, and fall precautions with pt and dtr at bedside. Bed alarm on w/bed in lowest position. Pt reminded to use call light. Will continue to monitor. Plan to dc to [facility] when medically cleared.

## 2024-02-09 NOTE — DIETARY NOTE
[facility]   CLINICAL NUTRITION    Mar Hernandez     Admitting diagnosis:  Closed fracture of left hip, initial encounter ([facility]) [S72.002A]    Ht: 162.6 cm (5' 4\")  Wt: 80.7 kg (178 lb).   Body mass index is 30.55 kg/m².  IBW: 54.5 kg    Wt Readings from Last 6 Encounters:   02/03/24 80.7 kg (178 lb)   09/28/20 80.6 kg (177 lb 9.6 oz)   06/23/20 82.7 kg (182 lb 4.8 oz)   05/14/20 83.2 kg (183 lb 8 oz)   10/30/19 80 kg (176 lb 4.8 oz)   07/18/19 79.7 kg (175 lb 12.8 oz)        Labs/Meds reviewed  -Glu:107  -MVI, Colace, Senokot, Milk of Mag    Diet:       Procedures    Regular/General diet Is Patient on Accuchecks? No     Percent Meals Eaten (last 3 days)       Date/Time Percent Meals Eaten (%)    02/07/24 1115 240 %    02/08/24 0942 50 %    02/08/24 1545 100 %    02/09/24 1000 20 %          Pt chart reviewed d/t length of stay. S/p mechanical fall PTA. Pt w/ L displaced transcervical femoral neck fx. S/p L hemiarthroplasty on 2/4.  Pt sleeping at time of visit. Spoke with pt's family present at bedside. Reported pt with good appetite PTA. However, after surgery, pt has been sleeping a lot and not eating very much. Encouraged smaller more frequent meals and discussed benefits of ONS to help maximize nutrition intakes. Agreeable to van Ensure Plus High Protein BID; at lunch and dinner.     No significant weight changes noted per EMR hx.    Plans to discharge to [facility] today.    PMH:HTN, HLD, hypothyroidism, dementia (A/O x 0-1).     Patient is at low nutrition risk at this time.    Please consult if patient status changes or nutrition issues arise.    Kristen Mark MS, RD, LDN  Clinical Dietitian  Ext:[phone]

## 2024-02-09 NOTE — DISCHARGE SUMMARY
[facility]  Discharge Summary    Mar Hernandez Patient Status:  Inpatient    1940 MRN [id number]   Location [facility] 3SW-A Attending No att. providers found   Hosp Day # 6 PCP Nydia Bauer MD     Date of Admission: 2/3/2024    Date of Discharge: 2024      Disposition: SNF Subacute Rehab at [facility]    Discharge Diagnosis:   #Left femoral neck fracture  #Mechanical fall prior to admission  -s/p Left hip hemiarthroplasty done for left displaced transcervical femoral neck fracture by Ortho Galo Oakes MD on 2023.Erwin hip brace in place for 4 weeks per Ortho    #Hypoxia multifactorial, due to atelectasis and acute pulmonary embolus    #Leukocytosis- reactive and resolved      # Transient n/v- resolved.      # Hypokalemia    #Advanced Dementia with history of periodic agitation-currently calm and pleasantly confused    #Essential hypertension    #Hyperlipidemia    #Hypothyroidism    # History of prior skin cancer     #Obesity-BMI 30.65     # Acute pulmonary embolism      Chief Complaint:   Chief Complaint   Patient presents with    Fall       History of Present Illness:   Mar Hernandez is a 83 year old female with Past medical history essential hypertension, hyperlipidemia, hypothyroidism, dementia (baseline alert and oriented x 0-1), presents to the hospital today after a mechanical fall causing hip pain.  Patient lives at a nursing facility and I was met with patient's son and daughter-in-law at the bedside.  They were not with their loved one at the facility but per chart review, speaking to staff and from family patient was otherwise in her normal state of health when she tripped and fell on her left hip.  Pain has been having pain and came into the hospital further evaluation.  Patient otherwise denies any chest pain, shortness of breath, fevers, chills, nausea, vomiting, diarrhea.   Please refer to history and physical done by Dr. Chaudhry for details on  admission    Brief Synopsis:   Workup done on admission included x-ray of the hip on 2/3/2024 which showed displaced and angulated fracture of the left femoral neck.  #Left femoral neck fracture  #Mechanical fall prior to admission  -Seen by orthopedic surgery  -s/p L hip hemiarthroplasty and brace per Ortho  -Initially on subcutaneous heparin for DVT prophylaxis.  Ortho initially advised aspirin 81 mg twice daily when ambulatory, subcutaneous heparin until that point in time per Ortho for DVT prophylaxis  -Pain control, IV fluids     Patient noticed to have hypoxia.  - patient with advanced dementia- unable to be complaint with IS, not moving much.  -suspect atelectasis    - CXR showed atelectasis-patient on incentive spirometry  -Status post neb treatment and Lasix with no improvement  -D-dimer done which came back elevated,  CTA chest done which came back positive for PE.  Started on heparin drip at this time after cleared by Ortho.  Patient tolerated this, no signs of bleeding and hemoglobin remained stable.  Switch to DOAC at the time of discharge, okay by Ortho for the same.     #Leukocytosis on admission- reactive and resolved      # Transient n/v on admission- no abd pain-resolved.  Tolerating diet.     # Hypokalemia  -Replaced as needed with electrolyte protocol     #Advanced Dementia with history of periodic agitation-currently calm and pleasantly confused  -Patient at baseline   -Has history of sundowning- monitor   -Continued home Aricept, trazodone and Seroquel  -Continued home medications at the time of discharge     #Essential hypertension, orthostatic hypotension  -Continued home lisinopril, dose decreased in the hospital as patient noticed to have low blood pressure when sitting up in chair out of bed, given IV fluids for orthostatic hypotension.  Blood pressure improved and remained stable.     #Hyperlipidemia  -Continued home statin     #Hypothyroidism  -Continued home levothyroxine     # History  of prior skin cancer     #Obesity-BMI 30.65     # Acute pulmonary embolism  -was on heparin drip from 2/7/2024 to 2/8/2024 and if no signs of bleeding ,switched to oral DOAC on evening of 2/8/2024  -hemoglobin relatively stable.  No signs of any bleeding and no ecchymosis at the left hip post surgical site.  --Bilateral lower extremity venous duplex  negative for DVT     Discharge planning to subacute rehab on Eliquis today, okay for discharge by all consultants including Ortho and pulmonary.  Ortho advised as below at the time of discharge  Activity: WBAT, Posterior hip precautions. McGrath brace x 4 weeks.   Anticoagulation: Okay for full anticoagulation. (Eliquis or Xarelto)   Analgesia: Per primary  Antibiotics: Complete  Disposition: [facility], outpatient followup with Sincer Abram CALLEJAS in 4 weeks.  F/u with ortho as directed, wound care and activity per Ortho.  Follow-up with regular outpatient primary care physician at skilled nursing facility within 2 to 3 days      TCM Diagnosis at discharge from Hospital: Other: See above; still recommend for TCM follow-up    Lace+ Score: 58  59-90 High Risk  29-58 Medium Risk  0-28   Low Risk.     TCM Follow-Up Recommendation:Mar Hernandez   LACE > 58: High Risk of readmission after discharge from the hospital.      PCP: Nydia Bauer MD    Consultations:   Consultants         Provider   Role Specialty     Boris Patel MD   Consulting Physician PULMONARY DISEASES     Galo Heck MD  Consulting Physician Sports Medicine       Procedures during hospitalization:   Left hip hemiarthroplasty done for left displaced transcervical femoral neck fracture by Ortho Galo Oakes MD on 2/4/2023    Incidental or significant findings and recommendations (brief descriptions):  None    Lab/Test results pending at Discharge:   None      Discharge Medications:        Discharge Medications        START taking these medications        Instructions Prescription details    apixaban 5 MG Tabs  Commonly known as: Eliquis  Start taking on: February 8, 2024      Take 2 tablets (10 mg total) by mouth 2 (two) times daily for 6 days, THEN 1 tablet (5 mg total) 2 (two) times daily.   Stop taking on: May 8, 2024  Quantity: 192 tablet  Refills: 0            CHANGE how you take these medications        Instructions Prescription details   cyanocobalamin 250 MCG Tabs  Commonly known as: Vitamin B12  What changed: how much to take      TAKE 1 TAB BY MOUTH DAILY   Quantity: 90 tablet  Refills: 3     lisinopril 5 MG Tabs  Commonly known as: Prinivil; Zestril  What changed:   medication strength  how much to take      Take 1 tablet (5 mg total) by mouth daily.   Stop taking on: March 11, 2024  Quantity: 30 tablet  Refills: 0            CONTINUE taking these medications        Instructions Prescription details   acetaminophen 500 MG Tabs  Commonly known as: Tylenol Extra Strength      Take 1 tablet (500 mg total) by mouth every 6 (six) hours as needed for Pain.   Quantity: 90 tablet  Refills: 3     Calcium + Vitamin D3 600-10 MG-MCG Tabs  Generic drug: Calcium Carb-Cholecalciferol      TAKE 1 TAB BY MOUTH TWICE DAILY   Quantity: 90 tablet  Refills: 3     donepezil 10 MG Tabs  Commonly known as: Aricept      Take 1 tablet (10 mg total) by mouth daily.   Quantity: 90 tablet  Refills: 3     fexofenadine 180 MG Tabs  Commonly known as: Allegra      Take 1 tablet (180 mg total) by mouth daily. One tab po q am prn sinus headache or PND.   Quantity: 30 tablet  Refills: 0     guaiFENesin  MG Tb12  Commonly known as: Mucinex      Take 2 tablets (1,200 mg total) by mouth 2 (two) times daily.   Quantity: 60 tablet  Refills: 5     ipratropium 0.06 % Soln  Commonly known as: Atrovent      2 sprays by Nasal route 4 (four) times daily.   Quantity: 1 each  Refills: 0     levothyroxine 75 MCG Tabs  Commonly known as: Synthroid      TAKE 1 TAB BY MOUTH EVERY MORNING BEFORE BREAKFAST   Quantity: 90  tablet  Refills: 0     memantine 10 MG Tabs  Commonly known as: Namenda      TAKE 1 TAB BY MOUTH TWICE DAILY   Quantity: 90 tablet  Refills: 3     Nystatin 675505 UNIT/GM Powd      Apply under both breast 3 times daily x30 days   Quantity: 60 g  Refills: 1     PreserVision AREDS Tabs      Take 1 capsule by mouth 2 (two) times daily.   Quantity: 180 tablet  Refills: 3     PROzac 10 MG Caps  Generic drug: FLUoxetine      Take 1 capsule (10 mg total) by mouth daily.   Quantity: 90 capsule  Refills: 3     SEROquel 25 MG Tabs  Generic drug: QUEtiapine      Take 1 tablet (25 mg total) by mouth nightly.   Refills: 0     simvastatin 20 MG Tabs  Commonly known as: Zocor      Take 1 tablet (20 mg total) by mouth nightly.   Quantity: 90 tablet  Refills: 2     traZODone 50 MG Tabs  Commonly known as: Desyrel      Take 1 tablet (50 mg total) by mouth nightly.   Refills: 0               Where to Get Your Medications        These medications were sent to [place] [address] [phone], [phone]  [address] [place] [address]      Phone: [phone]   apixaban 5 MG Tabs  lisinopril 5 MG Tabs          [place] prescription monitoring program data reviewed in epic before prescribing narcotics/controlled substances: Not applicable as no narcotics prescribed    Follow up Visits: Follow-up with Nydia Bauer MD in 1 week    Nydia Bauer MD  [address] [facility] [address] [phone]    Schedule an appointment as soon as possible for a visit in 1 week(s)      Galo Heck MD  [address] [place] [address] [phone]    Schedule an appointment as soon as possible for a visit in 1 week(s)      Appointments for Next 30 Days 2/9/2024 - 3/10/2024      None            Physical Exam:  /60   Pulse 91   Temp 97.5 °F (36.4 °C) (Oral)   Resp 18   Ht 5' 4\" (1.626 m)   Wt 178 lb (80.7 kg)   SpO2 94%   BMI 30.55 kg/m²      On room air  General: No acute distress   Respiratory: Clear to auscultation bilateral, no wheezes, no rhonchi  Cardiovascular: S1, S2, RRR  Abdomen: Soft, NT/ND, +BS  Extremities: no pedal edema, left hip post op fixation of left hip hemiarthroplasty and brace-Ortho following    Discharge Condition: stable    Patient Discharge Instructions: See electronic chart      More than 30 minutes on discharge    Rachel Bearden MD  2/9/2024

## 2024-02-12 ENCOUNTER — PATIENT OUTREACH (OUTPATIENT)
Dept: CASE MANAGEMENT | Age: 84
End: 2024-02-12

## 2024-02-19 ENCOUNTER — OFFICE VISIT (OUTPATIENT)
Dept: ORTHOPEDICS CLINIC | Facility: CLINIC | Age: 84
End: 2024-02-19
Payer: MEDICARE

## 2024-02-19 DIAGNOSIS — Z96.649 S/P HIP HEMIARTHROPLASTY: Primary | ICD-10-CM

## 2024-02-19 PROCEDURE — 99024 POSTOP FOLLOW-UP VISIT: CPT | Performed by: ORTHOPAEDIC SURGERY

## 2024-02-19 NOTE — PROGRESS NOTES
[facility] - ORTHOPEDICS  [address] [place] [address]  [phone]       Name: Mar Hernandez   MRN: [id number]  Date: 2/19/2024     REASON FOR VISIT: First Post-Surgical Visit   Surgery: Left hip hemiarthroplasty/bipolar on February 4, 2024.    INTERVAL HISTORY:  Mar Hernandez is a 83 year old female who returns after the aforementioned procedure.  The post-operative course has been unremarkable with pain well controlled and overall progress noted.     Physical therapy was started and is progressing well at [facility].  The patient denies any calf pain or tenderness, fevers, chills, sweats or signs of active infection. The patient has been compliant with the postoperative protocol, and admits to normal bowel and bladder function. 4/10 pain. No acute issues.     PE:   There were no vitals filed for this visit.  Estimated body mass index is 30.55 kg/m² as calculated from the following:    Height as of 2/3/24: 5' 4\" (1.626 m).    Weight as of 2/3/24: 178 lb.    Physical Exam  Constitutional:       Appearance: Normal appearance.   HENT:      Head: Normocephalic and atraumatic.   Eyes:      Extraocular Movements: Extraocular movements intact.   Neck:      Musculoskeletal: Normal range of motion and neck supple.   Cardiovascular:      Pulses: Normal pulses.   Pulmonary:      Effort: Pulmonary effort is normal. No respiratory distress.   Abdominal:      General: There is no distension.   Skin:     General: Skin is warm.      Capillary Refill: Capillary refill takes less than 2 seconds.      Findings: No bruising.   Neurological:      General: No focal deficit present.      Mental Status: She is alert.   Psychiatric:         Mood and Affect: Mood normal.     Examination of the left hip demonstrates:     Well-appearing surgical incision overlying the posterior aspect of the hip.  Able to stand with maximal assist.    The contralateral hip is without limitation  in range of motion or strength, no positive provocative maneuvers.     IMPRESSION: Mar Hernandez is a 83 year old female who presents 2 weeks s/p Left hip hemiarthroplasty/bipolar on February 4, 2024.    PLAN:   We had a lengthy discussion with the patient regarding the patient's findings consistent with the expected postoperative course. We recommend continuation of physical therapy with rehabilitation efforts focused on strengthening, range of motion, functional ability, and return to baseline activity. The patient can continue to progress per protocol.    All questions were answered appropriately and the patient was in agreement with the treatment plan.       FOLLOW-UP:  Follow-up in 3 months or as needed.  Repeat x-rays of the AP pelvis and left hip needed at next visit if patient returns for follow-up evaluation      Galo Heck MD  Knee, Shoulder, & Elbow Surgery / Sports Medicine Specialist  Orthopaedic Surgery  [address] [place] [address] [id number] [place] [address].org  [email or web address]  t: [phone]  o: [phone]  f: [phone]

## 2024-03-12 NOTE — CM/SW NOTE
S/p nephrectomy   Spoke with Dr Bearden who stated pt can discharge to [facility] today.  [facility] and informed bed available after 4pm.  Ambulance transport scheduled for 4pm today.  PCS form completed and available for RN to print.  Updated pt's son Mitch at bedside.  Updated RN who stated pt with BP issues and may not discharge today.  Await confirmation of medical clearance for DC.  / to remain available for support and/or discharge planning.     [facility] [place]  [phone]    [facility]  [phone]    [facility]  Discharge Planner  [phone]

## 2024-03-25 ENCOUNTER — LAB REQUISITION (OUTPATIENT)
Dept: LAB | Facility: HOSPITAL | Age: 84
End: 2024-03-25
Payer: MEDICARE

## 2024-03-25 DIAGNOSIS — S72.002D FRACTURE OF UNSPECIFIED PART OF NECK OF LEFT FEMUR, SUBSEQUENT ENCOUNTER FOR CLOSED FRACTURE WITH ROUTINE HEALING: ICD-10-CM

## 2024-03-25 LAB
ALBUMIN SERPL-MCNC: 2.9 G/DL (ref 3.4–5)
ALBUMIN/GLOB SERPL: 0.9 {RATIO} (ref 1–2)
ALP LIVER SERPL-CCNC: 107 U/L
ALT SERPL-CCNC: 20 U/L
ANION GAP SERPL CALC-SCNC: 4 MMOL/L (ref 0–18)
AST SERPL-CCNC: 17 U/L (ref 15–37)
BASOPHILS # BLD AUTO: 0.03 X10(3) UL (ref 0–0.2)
BASOPHILS NFR BLD AUTO: 0.4 %
BILIRUB SERPL-MCNC: 0.2 MG/DL (ref 0.1–2)
BUN BLD-MCNC: 18 MG/DL (ref 9–23)
CALCIUM BLD-MCNC: 9.6 MG/DL (ref 8.5–10.1)
CHLORIDE SERPL-SCNC: 116 MMOL/L (ref 98–112)
CO2 SERPL-SCNC: 26 MMOL/L (ref 21–32)
CREAT BLD-MCNC: 1.04 MG/DL
EGFRCR SERPLBLD CKD-EPI 2021: 54 ML/MIN/1.73M2 (ref 60–?)
EOSINOPHIL # BLD AUTO: 0.21 X10(3) UL (ref 0–0.7)
EOSINOPHIL NFR BLD AUTO: 2.9 %
ERYTHROCYTE [DISTWIDTH] IN BLOOD BY AUTOMATED COUNT: 13.9 %
FASTING STATUS PATIENT QL REPORTED: YES
GLOBULIN PLAS-MCNC: 3.4 G/DL (ref 2.8–4.4)
GLUCOSE BLD-MCNC: 85 MG/DL (ref 70–99)
HCT VFR BLD AUTO: 39.1 %
HGB BLD-MCNC: 12 G/DL
IMM GRANULOCYTES # BLD AUTO: 0.03 X10(3) UL (ref 0–1)
IMM GRANULOCYTES NFR BLD: 0.4 %
LYMPHOCYTES # BLD AUTO: 2.62 X10(3) UL (ref 1–4)
LYMPHOCYTES NFR BLD AUTO: 35.7 %
MCH RBC QN AUTO: 29.1 PG (ref 26–34)
MCHC RBC AUTO-ENTMCNC: 30.7 G/DL (ref 31–37)
MCV RBC AUTO: 94.9 FL
MONOCYTES # BLD AUTO: 0.85 X10(3) UL (ref 0.1–1)
MONOCYTES NFR BLD AUTO: 11.6 %
NEUTROPHILS # BLD AUTO: 3.59 X10 (3) UL (ref 1.5–7.7)
NEUTROPHILS # BLD AUTO: 3.59 X10(3) UL (ref 1.5–7.7)
NEUTROPHILS NFR BLD AUTO: 49 %
OSMOLALITY SERPL CALC.SUM OF ELEC: 303 MOSM/KG (ref 275–295)
PLATELET # BLD AUTO: 255 10(3)UL (ref 150–450)
POTASSIUM SERPL-SCNC: 3.8 MMOL/L (ref 3.5–5.1)
PROT SERPL-MCNC: 6.3 G/DL (ref 6.4–8.2)
RBC # BLD AUTO: 4.12 X10(6)UL
SODIUM SERPL-SCNC: 146 MMOL/L (ref 136–145)
WBC # BLD AUTO: 7.3 X10(3) UL (ref 4–11)

## 2024-03-25 PROCEDURE — 85025 COMPLETE CBC W/AUTO DIFF WBC: CPT | Performed by: HOSPITALIST

## 2024-03-25 PROCEDURE — 80053 COMPREHEN METABOLIC PANEL: CPT | Performed by: HOSPITALIST

## 2024-04-01 ENCOUNTER — LAB REQUISITION (OUTPATIENT)
Dept: LAB | Facility: HOSPITAL | Age: 84
End: 2024-04-01
Payer: MEDICARE

## 2024-04-01 DIAGNOSIS — Z47.1 AFTERCARE FOLLOWING JOINT REPLACEMENT SURGERY: ICD-10-CM

## 2024-04-01 LAB
ALBUMIN SERPL-MCNC: 2.7 G/DL (ref 3.4–5)
ALBUMIN/GLOB SERPL: 0.9 {RATIO} (ref 1–2)
ALP LIVER SERPL-CCNC: 106 U/L
ALT SERPL-CCNC: 19 U/L
ANION GAP SERPL CALC-SCNC: 4 MMOL/L (ref 0–18)
AST SERPL-CCNC: 11 U/L (ref 15–37)
BASOPHILS # BLD AUTO: 0.03 X10(3) UL (ref 0–0.2)
BASOPHILS NFR BLD AUTO: 0.5 %
BILIRUB SERPL-MCNC: 0.3 MG/DL (ref 0.1–2)
BUN BLD-MCNC: 14 MG/DL (ref 9–23)
CALCIUM BLD-MCNC: 9.9 MG/DL (ref 8.5–10.1)
CHLORIDE SERPL-SCNC: 110 MMOL/L (ref 98–112)
CO2 SERPL-SCNC: 28 MMOL/L (ref 21–32)
CREAT BLD-MCNC: 1.02 MG/DL
EGFRCR SERPLBLD CKD-EPI 2021: 55 ML/MIN/1.73M2 (ref 60–?)
EOSINOPHIL # BLD AUTO: 0.14 X10(3) UL (ref 0–0.7)
EOSINOPHIL NFR BLD AUTO: 2.2 %
ERYTHROCYTE [DISTWIDTH] IN BLOOD BY AUTOMATED COUNT: 13.3 %
FASTING STATUS PATIENT QL REPORTED: YES
GLOBULIN PLAS-MCNC: 3.1 G/DL (ref 2.8–4.4)
GLUCOSE BLD-MCNC: 89 MG/DL (ref 70–99)
HCT VFR BLD AUTO: 39.3 %
HGB BLD-MCNC: 12.4 G/DL
IMM GRANULOCYTES # BLD AUTO: 0.02 X10(3) UL (ref 0–1)
IMM GRANULOCYTES NFR BLD: 0.3 %
LYMPHOCYTES # BLD AUTO: 2.71 X10(3) UL (ref 1–4)
LYMPHOCYTES NFR BLD AUTO: 43.2 %
MCH RBC QN AUTO: 29.5 PG (ref 26–34)
MCHC RBC AUTO-ENTMCNC: 31.6 G/DL (ref 31–37)
MCV RBC AUTO: 93.3 FL
MONOCYTES # BLD AUTO: 0.65 X10(3) UL (ref 0.1–1)
MONOCYTES NFR BLD AUTO: 10.4 %
NEUTROPHILS # BLD AUTO: 2.72 X10 (3) UL (ref 1.5–7.7)
NEUTROPHILS # BLD AUTO: 2.72 X10(3) UL (ref 1.5–7.7)
NEUTROPHILS NFR BLD AUTO: 43.4 %
OSMOLALITY SERPL CALC.SUM OF ELEC: 294 MOSM/KG (ref 275–295)
PLATELET # BLD AUTO: 233 10(3)UL (ref 150–450)
POTASSIUM SERPL-SCNC: 3.7 MMOL/L (ref 3.5–5.1)
PROT SERPL-MCNC: 5.8 G/DL (ref 6.4–8.2)
RBC # BLD AUTO: 4.21 X10(6)UL
SODIUM SERPL-SCNC: 142 MMOL/L (ref 136–145)
WBC # BLD AUTO: 6.3 X10(3) UL (ref 4–11)

## 2024-04-01 PROCEDURE — 85025 COMPLETE CBC W/AUTO DIFF WBC: CPT | Performed by: HOSPITALIST

## 2024-04-01 PROCEDURE — 80053 COMPREHEN METABOLIC PANEL: CPT | Performed by: HOSPITALIST

## 2024-04-29 ENCOUNTER — LAB REQUISITION (OUTPATIENT)
Dept: LAB | Facility: HOSPITAL | Age: 84
End: 2024-04-29
Payer: MEDICARE

## 2024-04-29 DIAGNOSIS — R69 ILLNESS, UNSPECIFIED: ICD-10-CM

## 2024-04-29 LAB
ALBUMIN SERPL-MCNC: 2.8 G/DL (ref 3.4–5)
ALBUMIN/GLOB SERPL: 0.8 {RATIO} (ref 1–2)
ALP LIVER SERPL-CCNC: 122 U/L
ALT SERPL-CCNC: 13 U/L
ANION GAP SERPL CALC-SCNC: 4 MMOL/L (ref 0–18)
AST SERPL-CCNC: 11 U/L (ref 15–37)
BASOPHILS # BLD AUTO: 0.05 X10(3) UL (ref 0–0.2)
BASOPHILS NFR BLD AUTO: 0.6 %
BILIRUB SERPL-MCNC: 0.3 MG/DL (ref 0.1–2)
BUN BLD-MCNC: 20 MG/DL (ref 9–23)
CALCIUM BLD-MCNC: 10.1 MG/DL (ref 8.5–10.1)
CHLORIDE SERPL-SCNC: 112 MMOL/L (ref 98–112)
CO2 SERPL-SCNC: 29 MMOL/L (ref 21–32)
CREAT BLD-MCNC: 1.02 MG/DL
EGFRCR SERPLBLD CKD-EPI 2021: 55 ML/MIN/1.73M2 (ref 60–?)
EOSINOPHIL # BLD AUTO: 0.2 X10(3) UL (ref 0–0.7)
EOSINOPHIL NFR BLD AUTO: 2.5 %
ERYTHROCYTE [DISTWIDTH] IN BLOOD BY AUTOMATED COUNT: 13 %
FASTING STATUS PATIENT QL REPORTED: YES
GLOBULIN PLAS-MCNC: 3.4 G/DL (ref 2.8–4.4)
GLUCOSE BLD-MCNC: 82 MG/DL (ref 70–99)
HCT VFR BLD AUTO: 39.4 %
HGB BLD-MCNC: 13.1 G/DL
IMM GRANULOCYTES # BLD AUTO: 0.02 X10(3) UL (ref 0–1)
IMM GRANULOCYTES NFR BLD: 0.2 %
LYMPHOCYTES # BLD AUTO: 2.58 X10(3) UL (ref 1–4)
LYMPHOCYTES NFR BLD AUTO: 32 %
MCH RBC QN AUTO: 30 PG (ref 26–34)
MCHC RBC AUTO-ENTMCNC: 33.2 G/DL (ref 31–37)
MCV RBC AUTO: 90.2 FL
MONOCYTES # BLD AUTO: 0.73 X10(3) UL (ref 0.1–1)
MONOCYTES NFR BLD AUTO: 9 %
NEUTROPHILS # BLD AUTO: 4.49 X10 (3) UL (ref 1.5–7.7)
NEUTROPHILS # BLD AUTO: 4.49 X10(3) UL (ref 1.5–7.7)
NEUTROPHILS NFR BLD AUTO: 55.7 %
OSMOLALITY SERPL CALC.SUM OF ELEC: 302 MOSM/KG (ref 275–295)
PLATELET # BLD AUTO: 207 10(3)UL (ref 150–450)
POTASSIUM SERPL-SCNC: 4 MMOL/L (ref 3.5–5.1)
PROT SERPL-MCNC: 6.2 G/DL (ref 6.4–8.2)
RBC # BLD AUTO: 4.37 X10(6)UL
SODIUM SERPL-SCNC: 145 MMOL/L (ref 136–145)
WBC # BLD AUTO: 8.1 X10(3) UL (ref 4–11)

## 2024-04-29 PROCEDURE — 80053 COMPREHEN METABOLIC PANEL: CPT | Performed by: HOSPITALIST

## 2024-04-29 PROCEDURE — 85025 COMPLETE CBC W/AUTO DIFF WBC: CPT | Performed by: HOSPITALIST

## 2024-07-29 ENCOUNTER — LAB REQUISITION (OUTPATIENT)
Dept: LAB | Facility: HOSPITAL | Age: 84
End: 2024-07-29
Payer: MEDICARE

## 2024-07-29 DIAGNOSIS — M62.81 MUSCLE WEAKNESS (GENERALIZED): ICD-10-CM

## 2024-07-29 LAB
ALBUMIN SERPL-MCNC: 3.6 G/DL (ref 3.2–4.8)
ALBUMIN/GLOB SERPL: 1.5 {RATIO} (ref 1–2)
ALP LIVER SERPL-CCNC: 89 U/L
ALT SERPL-CCNC: 18 U/L
ANION GAP SERPL CALC-SCNC: 7 MMOL/L (ref 0–18)
AST SERPL-CCNC: 15 U/L (ref ?–34)
BASOPHILS # BLD AUTO: 0.04 X10(3) UL (ref 0–0.2)
BASOPHILS NFR BLD AUTO: 0.6 %
BILIRUB SERPL-MCNC: 0.4 MG/DL (ref 0.2–1.1)
BUN BLD-MCNC: 14 MG/DL (ref 9–23)
CALCIUM BLD-MCNC: 9.8 MG/DL (ref 8.7–10.4)
CHLORIDE SERPL-SCNC: 112 MMOL/L (ref 98–112)
CO2 SERPL-SCNC: 24 MMOL/L (ref 21–32)
CREAT BLD-MCNC: 0.88 MG/DL
EGFRCR SERPLBLD CKD-EPI 2021: 66 ML/MIN/1.73M2 (ref 60–?)
EOSINOPHIL # BLD AUTO: 0.12 X10(3) UL (ref 0–0.7)
EOSINOPHIL NFR BLD AUTO: 1.8 %
ERYTHROCYTE [DISTWIDTH] IN BLOOD BY AUTOMATED COUNT: 14.3 %
GLOBULIN PLAS-MCNC: 2.4 G/DL (ref 2–3.5)
GLUCOSE BLD-MCNC: 70 MG/DL (ref 70–99)
HCT VFR BLD AUTO: 38.9 %
HGB BLD-MCNC: 12.8 G/DL
IMM GRANULOCYTES # BLD AUTO: 0.02 X10(3) UL (ref 0–1)
IMM GRANULOCYTES NFR BLD: 0.3 %
LYMPHOCYTES # BLD AUTO: 2.36 X10(3) UL (ref 1–4)
LYMPHOCYTES NFR BLD AUTO: 35.6 %
MCH RBC QN AUTO: 29.4 PG (ref 26–34)
MCHC RBC AUTO-ENTMCNC: 32.9 G/DL (ref 31–37)
MCV RBC AUTO: 89.4 FL
MONOCYTES # BLD AUTO: 0.73 X10(3) UL (ref 0.1–1)
MONOCYTES NFR BLD AUTO: 11 %
NEUTROPHILS # BLD AUTO: 3.35 X10 (3) UL (ref 1.5–7.7)
NEUTROPHILS # BLD AUTO: 3.35 X10(3) UL (ref 1.5–7.7)
NEUTROPHILS NFR BLD AUTO: 50.7 %
OSMOLALITY SERPL CALC.SUM OF ELEC: 295 MOSM/KG (ref 275–295)
PLATELET # BLD AUTO: 212 10(3)UL (ref 150–450)
POTASSIUM SERPL-SCNC: 3.8 MMOL/L (ref 3.5–5.1)
PROT SERPL-MCNC: 6 G/DL (ref 5.7–8.2)
RBC # BLD AUTO: 4.35 X10(6)UL
SODIUM SERPL-SCNC: 143 MMOL/L (ref 136–145)
WBC # BLD AUTO: 6.6 X10(3) UL (ref 4–11)

## 2024-07-29 PROCEDURE — 80053 COMPREHEN METABOLIC PANEL: CPT | Performed by: HOSPITALIST

## 2024-07-29 PROCEDURE — 85025 COMPLETE CBC W/AUTO DIFF WBC: CPT | Performed by: HOSPITALIST

## 2024-07-30 ENCOUNTER — LAB REQUISITION (OUTPATIENT)
Dept: LAB | Facility: HOSPITAL | Age: 84
End: 2024-07-30
Payer: MEDICARE

## 2024-07-30 DIAGNOSIS — Z91.81 HISTORY OF FALLING: ICD-10-CM

## 2024-07-30 DIAGNOSIS — Z47.1 AFTERCARE FOLLOWING JOINT REPLACEMENT SURGERY: ICD-10-CM

## 2024-07-30 LAB
ALBUMIN SERPL-MCNC: 4.4 G/DL (ref 3.2–4.8)
ALBUMIN/GLOB SERPL: 1.8 {RATIO} (ref 1–2)
ALP LIVER SERPL-CCNC: 99 U/L
ALT SERPL-CCNC: 21 U/L
ANION GAP SERPL CALC-SCNC: 7 MMOL/L (ref 0–18)
AST SERPL-CCNC: 18 U/L (ref ?–34)
BASOPHILS # BLD AUTO: 0.04 X10(3) UL (ref 0–0.2)
BASOPHILS NFR BLD AUTO: 0.6 %
BILIRUB SERPL-MCNC: 0.5 MG/DL (ref 0.2–1.1)
BUN BLD-MCNC: 13 MG/DL (ref 9–23)
CALCIUM BLD-MCNC: 10.2 MG/DL (ref 8.7–10.4)
CHLORIDE SERPL-SCNC: 109 MMOL/L (ref 98–112)
CO2 SERPL-SCNC: 25 MMOL/L (ref 21–32)
CREAT BLD-MCNC: 0.94 MG/DL
EGFRCR SERPLBLD CKD-EPI 2021: 61 ML/MIN/1.73M2 (ref 60–?)
EOSINOPHIL # BLD AUTO: 0.1 X10(3) UL (ref 0–0.7)
EOSINOPHIL NFR BLD AUTO: 1.4 %
ERYTHROCYTE [DISTWIDTH] IN BLOOD BY AUTOMATED COUNT: 14.3 %
FASTING STATUS PATIENT QL REPORTED: YES
GLOBULIN PLAS-MCNC: 2.5 G/DL (ref 2–3.5)
GLUCOSE BLD-MCNC: 74 MG/DL (ref 70–99)
HCT VFR BLD AUTO: 44.3 %
HGB BLD-MCNC: 14.4 G/DL
IMM GRANULOCYTES # BLD AUTO: 0.03 X10(3) UL (ref 0–1)
IMM GRANULOCYTES NFR BLD: 0.4 %
LYMPHOCYTES # BLD AUTO: 2.85 X10(3) UL (ref 1–4)
LYMPHOCYTES NFR BLD AUTO: 41.2 %
MCH RBC QN AUTO: 29.8 PG (ref 26–34)
MCHC RBC AUTO-ENTMCNC: 32.5 G/DL (ref 31–37)
MCV RBC AUTO: 91.7 FL
MONOCYTES # BLD AUTO: 0.7 X10(3) UL (ref 0.1–1)
MONOCYTES NFR BLD AUTO: 10.1 %
NEUTROPHILS # BLD AUTO: 3.2 X10 (3) UL (ref 1.5–7.7)
NEUTROPHILS # BLD AUTO: 3.2 X10(3) UL (ref 1.5–7.7)
NEUTROPHILS NFR BLD AUTO: 46.3 %
OSMOLALITY SERPL CALC.SUM OF ELEC: 291 MOSM/KG (ref 275–295)
PLATELET # BLD AUTO: 228 10(3)UL (ref 150–450)
POTASSIUM SERPL-SCNC: 3.7 MMOL/L (ref 3.5–5.1)
PROT SERPL-MCNC: 6.9 G/DL (ref 5.7–8.2)
RBC # BLD AUTO: 4.83 X10(6)UL
SODIUM SERPL-SCNC: 141 MMOL/L (ref 136–145)
WBC # BLD AUTO: 6.9 X10(3) UL (ref 4–11)

## 2024-07-30 PROCEDURE — 80053 COMPREHEN METABOLIC PANEL: CPT | Performed by: HOSPITALIST

## 2024-07-30 PROCEDURE — 85025 COMPLETE CBC W/AUTO DIFF WBC: CPT | Performed by: HOSPITALIST

## 2024-10-29 ENCOUNTER — LAB REQUISITION (OUTPATIENT)
Dept: LAB | Facility: HOSPITAL | Age: 84
End: 2024-10-29
Payer: MEDICARE

## 2024-10-29 DIAGNOSIS — Z96.642 PRESENCE OF LEFT ARTIFICIAL HIP JOINT: ICD-10-CM

## 2024-10-29 DIAGNOSIS — M62.81 MUSCLE WEAKNESS (GENERALIZED): ICD-10-CM

## 2024-10-29 LAB
ALBUMIN SERPL-MCNC: 3.5 G/DL (ref 3.2–4.8)
ALBUMIN/GLOB SERPL: 1.5 {RATIO} (ref 1–2)
ALP LIVER SERPL-CCNC: 89 U/L
ALT SERPL-CCNC: 11 U/L
ANION GAP SERPL CALC-SCNC: 8 MMOL/L (ref 0–18)
AST SERPL-CCNC: 16 U/L (ref ?–34)
BASOPHILS # BLD AUTO: 0.04 X10(3) UL (ref 0–0.2)
BASOPHILS NFR BLD AUTO: 0.6 %
BILIRUB SERPL-MCNC: 0.4 MG/DL (ref 0.2–1.1)
BUN BLD-MCNC: 20 MG/DL (ref 9–23)
CALCIUM BLD-MCNC: 10.1 MG/DL (ref 8.7–10.4)
CHLORIDE SERPL-SCNC: 111 MMOL/L (ref 98–112)
CO2 SERPL-SCNC: 24 MMOL/L (ref 21–32)
CREAT BLD-MCNC: 0.95 MG/DL
EGFRCR SERPLBLD CKD-EPI 2021: 60 ML/MIN/1.73M2 (ref 60–?)
EOSINOPHIL # BLD AUTO: 0.14 X10(3) UL (ref 0–0.7)
EOSINOPHIL NFR BLD AUTO: 2.3 %
ERYTHROCYTE [DISTWIDTH] IN BLOOD BY AUTOMATED COUNT: 12.3 %
GLOBULIN PLAS-MCNC: 2.3 G/DL (ref 2–3.5)
GLUCOSE BLD-MCNC: 81 MG/DL (ref 70–99)
HCT VFR BLD AUTO: 39.7 %
HGB BLD-MCNC: 12.9 G/DL
IMM GRANULOCYTES # BLD AUTO: 0.03 X10(3) UL (ref 0–1)
IMM GRANULOCYTES NFR BLD: 0.5 %
LYMPHOCYTES # BLD AUTO: 2.64 X10(3) UL (ref 1–4)
LYMPHOCYTES NFR BLD AUTO: 42.6 %
MCH RBC QN AUTO: 30.8 PG (ref 26–34)
MCHC RBC AUTO-ENTMCNC: 32.5 G/DL (ref 31–37)
MCV RBC AUTO: 94.7 FL
MONOCYTES # BLD AUTO: 0.69 X10(3) UL (ref 0.1–1)
MONOCYTES NFR BLD AUTO: 11.1 %
NEUTROPHILS # BLD AUTO: 2.65 X10 (3) UL (ref 1.5–7.7)
NEUTROPHILS # BLD AUTO: 2.65 X10(3) UL (ref 1.5–7.7)
NEUTROPHILS NFR BLD AUTO: 42.9 %
OSMOLALITY SERPL CALC.SUM OF ELEC: 298 MOSM/KG (ref 275–295)
PLATELET # BLD AUTO: 215 10(3)UL (ref 150–450)
POTASSIUM SERPL-SCNC: 3.8 MMOL/L (ref 3.5–5.1)
PROT SERPL-MCNC: 5.8 G/DL (ref 5.7–8.2)
RBC # BLD AUTO: 4.19 X10(6)UL
SODIUM SERPL-SCNC: 143 MMOL/L (ref 136–145)
WBC # BLD AUTO: 6.2 X10(3) UL (ref 4–11)

## 2024-10-29 PROCEDURE — 85025 COMPLETE CBC W/AUTO DIFF WBC: CPT | Performed by: HOSPITALIST

## 2024-10-29 PROCEDURE — 80053 COMPREHEN METABOLIC PANEL: CPT | Performed by: HOSPITALIST

## 2024-11-15 ENCOUNTER — HOSPITAL ENCOUNTER (INPATIENT)
Facility: HOSPITAL | Age: 84
LOS: 4 days | Discharge: NURSING, REHAB OR LONG-TERM CARE | DRG: 853 | End: 2024-11-19
Attending: EMERGENCY MEDICINE | Admitting: INTERNAL MEDICINE
Payer: MEDICARE

## 2024-11-15 ENCOUNTER — ANESTHESIA (OUTPATIENT)
Dept: SURGERY | Facility: HOSPITAL | Age: 84
End: 2024-11-15
Payer: MEDICARE

## 2024-11-15 ENCOUNTER — HOSPITAL ENCOUNTER (INPATIENT)
Facility: HOSPITAL | Age: 84
LOS: 4 days | Discharge: NURSING, REHAB OR LONG-TERM CARE | End: 2024-11-19
Attending: EMERGENCY MEDICINE | Admitting: INTERNAL MEDICINE
Payer: MEDICARE

## 2024-11-15 ENCOUNTER — APPOINTMENT (OUTPATIENT)
Dept: CT IMAGING | Facility: HOSPITAL | Age: 84
DRG: 853 | End: 2024-11-15
Attending: EMERGENCY MEDICINE
Payer: MEDICARE

## 2024-11-15 ENCOUNTER — ANESTHESIA EVENT (OUTPATIENT)
Dept: SURGERY | Facility: HOSPITAL | Age: 84
End: 2024-11-15
Payer: MEDICARE

## 2024-11-15 ENCOUNTER — APPOINTMENT (OUTPATIENT)
Dept: GENERAL RADIOLOGY | Facility: HOSPITAL | Age: 84
End: 2024-11-15
Attending: STUDENT IN AN ORGANIZED HEALTH CARE EDUCATION/TRAINING PROGRAM
Payer: MEDICARE

## 2024-11-15 ENCOUNTER — APPOINTMENT (OUTPATIENT)
Dept: CT IMAGING | Facility: HOSPITAL | Age: 84
End: 2024-11-15
Attending: EMERGENCY MEDICINE
Payer: MEDICARE

## 2024-11-15 ENCOUNTER — APPOINTMENT (OUTPATIENT)
Dept: GENERAL RADIOLOGY | Facility: HOSPITAL | Age: 84
DRG: 853 | End: 2024-11-15
Attending: STUDENT IN AN ORGANIZED HEALTH CARE EDUCATION/TRAINING PROGRAM
Payer: MEDICARE

## 2024-11-15 DIAGNOSIS — A41.9 SEPSIS, DUE TO UNSPECIFIED ORGANISM, UNSPECIFIED WHETHER ACUTE ORGAN DYSFUNCTION PRESENT (HCC): ICD-10-CM

## 2024-11-15 DIAGNOSIS — K81.0 ACUTE CHOLECYSTITIS: Primary | ICD-10-CM

## 2024-11-15 DIAGNOSIS — K81.9 CHOLECYSTITIS: ICD-10-CM

## 2024-11-15 LAB
ALBUMIN SERPL-MCNC: 4.4 G/DL (ref 3.2–4.8)
ALBUMIN/GLOB SERPL: 1.2 {RATIO} (ref 1–2)
ALP LIVER SERPL-CCNC: 95 U/L
ALT SERPL-CCNC: 28 U/L
ANION GAP SERPL CALC-SCNC: 10 MMOL/L (ref 0–18)
AST SERPL-CCNC: 23 U/L (ref ?–34)
ATRIAL RATE: 97 BPM
BASOPHILS # BLD AUTO: 0.03 X10(3) UL (ref 0–0.2)
BASOPHILS NFR BLD AUTO: 0.1 %
BILIRUB SERPL-MCNC: 0.9 MG/DL (ref 0.2–1.1)
BILIRUB UR QL STRIP.AUTO: NEGATIVE
BUN BLD-MCNC: 18 MG/DL (ref 9–23)
CALCIUM BLD-MCNC: 11 MG/DL (ref 8.7–10.4)
CHLORIDE SERPL-SCNC: 102 MMOL/L (ref 98–112)
CLARITY UR REFRACT.AUTO: CLEAR
CO2 SERPL-SCNC: 25 MMOL/L (ref 21–32)
COLOR UR AUTO: YELLOW
CREAT BLD-MCNC: 0.98 MG/DL
EGFRCR SERPLBLD CKD-EPI 2021: 58 ML/MIN/1.73M2 (ref 60–?)
EOSINOPHIL # BLD AUTO: 0 X10(3) UL (ref 0–0.7)
EOSINOPHIL NFR BLD AUTO: 0 %
ERYTHROCYTE [DISTWIDTH] IN BLOOD BY AUTOMATED COUNT: 12.5 %
GLOBULIN PLAS-MCNC: 3.7 G/DL (ref 2–3.5)
GLUCOSE BLD-MCNC: 172 MG/DL (ref 70–99)
GLUCOSE UR STRIP.AUTO-MCNC: NORMAL MG/DL
HCT VFR BLD AUTO: 46 %
HGB BLD-MCNC: 15.4 G/DL
IMM GRANULOCYTES # BLD AUTO: 0.26 X10(3) UL (ref 0–1)
IMM GRANULOCYTES NFR BLD: 1.1 %
KETONES UR STRIP.AUTO-MCNC: NEGATIVE MG/DL
LACTATE SERPL-SCNC: 3.5 MMOL/L (ref 0.5–2)
LACTATE SERPL-SCNC: 3.7 MMOL/L (ref 0.5–2)
LEUKOCYTE ESTERASE UR QL STRIP.AUTO: NEGATIVE
LYMPHOCYTES # BLD AUTO: 1.12 X10(3) UL (ref 1–4)
LYMPHOCYTES NFR BLD AUTO: 4.7 %
MCH RBC QN AUTO: 30.4 PG (ref 26–34)
MCHC RBC AUTO-ENTMCNC: 33.5 G/DL (ref 31–37)
MCV RBC AUTO: 90.7 FL
MONOCYTES # BLD AUTO: 2.25 X10(3) UL (ref 0.1–1)
MONOCYTES NFR BLD AUTO: 9.4 %
NEUTROPHILS # BLD AUTO: 20.23 X10 (3) UL (ref 1.5–7.7)
NEUTROPHILS # BLD AUTO: 20.23 X10(3) UL (ref 1.5–7.7)
NEUTROPHILS NFR BLD AUTO: 84.7 %
NITRITE UR QL STRIP.AUTO: NEGATIVE
OSMOLALITY SERPL CALC.SUM OF ELEC: 290 MOSM/KG (ref 275–295)
P AXIS: 4 DEGREES
P-R INTERVAL: 116 MS
PH UR STRIP.AUTO: 6 [PH] (ref 5–8)
PLATELET # BLD AUTO: 199 10(3)UL (ref 150–450)
POTASSIUM SERPL-SCNC: 3.8 MMOL/L (ref 3.5–5.1)
PROT SERPL-MCNC: 8.1 G/DL (ref 5.7–8.2)
PROT UR STRIP.AUTO-MCNC: 30 MG/DL
Q-T INTERVAL: 314 MS
QRS DURATION: 74 MS
QTC CALCULATION (BEZET): 398 MS
R AXIS: -21 DEGREES
RBC # BLD AUTO: 5.07 X10(6)UL
RBC UR QL AUTO: NEGATIVE
SODIUM SERPL-SCNC: 137 MMOL/L (ref 136–145)
SP GR UR STRIP.AUTO: 1.02 (ref 1–1.03)
T AXIS: -35 DEGREES
TROPONIN I SERPL HS-MCNC: 7 NG/L
UROBILINOGEN UR STRIP.AUTO-MCNC: NORMAL MG/DL
VENTRICULAR RATE: 97 BPM
WBC # BLD AUTO: 23.9 X10(3) UL (ref 4–11)

## 2024-11-15 PROCEDURE — 3E0T3BZ INTRODUCTION OF ANESTHETIC AGENT INTO PERIPHERAL NERVES AND PLEXI, PERCUTANEOUS APPROACH: ICD-10-PCS | Performed by: STUDENT IN AN ORGANIZED HEALTH CARE EDUCATION/TRAINING PROGRAM

## 2024-11-15 PROCEDURE — 0FT44ZZ RESECTION OF GALLBLADDER, PERCUTANEOUS ENDOSCOPIC APPROACH: ICD-10-PCS | Performed by: STUDENT IN AN ORGANIZED HEALTH CARE EDUCATION/TRAINING PROGRAM

## 2024-11-15 PROCEDURE — 74300 X-RAY BILE DUCTS/PANCREAS: CPT | Performed by: STUDENT IN AN ORGANIZED HEALTH CARE EDUCATION/TRAINING PROGRAM

## 2024-11-15 PROCEDURE — 47563 LAPARO CHOLECYSTECTOMY/GRAPH: CPT | Performed by: SURGERY

## 2024-11-15 PROCEDURE — 30283B1 TRANSFUSION OF NONAUTOLOGOUS 4-FACTOR PROTHROMBIN COMPLEX CONCENTRATE INTO VEIN, PERCUTANEOUS APPROACH: ICD-10-PCS

## 2024-11-15 PROCEDURE — 99233 SBSQ HOSP IP/OBS HIGH 50: CPT | Performed by: STUDENT IN AN ORGANIZED HEALTH CARE EDUCATION/TRAINING PROGRAM

## 2024-11-15 PROCEDURE — 99223 1ST HOSP IP/OBS HIGH 75: CPT | Performed by: INTERNAL MEDICINE

## 2024-11-15 PROCEDURE — BF141ZZ FLUOROSCOPY OF GALLBLADDER, BILE DUCTS AND PANCREATIC DUCTS USING LOW OSMOLAR CONTRAST: ICD-10-PCS | Performed by: STUDENT IN AN ORGANIZED HEALTH CARE EDUCATION/TRAINING PROGRAM

## 2024-11-15 PROCEDURE — 47563 LAPARO CHOLECYSTECTOMY/GRAPH: CPT | Performed by: STUDENT IN AN ORGANIZED HEALTH CARE EDUCATION/TRAINING PROGRAM

## 2024-11-15 PROCEDURE — 74177 CT ABD & PELVIS W/CONTRAST: CPT | Performed by: EMERGENCY MEDICINE

## 2024-11-15 RX ORDER — MORPHINE SULFATE 4 MG/ML
2 INJECTION, SOLUTION INTRAMUSCULAR; INTRAVENOUS ONCE
Status: COMPLETED | OUTPATIENT
Start: 2024-11-15 | End: 2024-11-15

## 2024-11-15 RX ORDER — KETOROLAC TROMETHAMINE 15 MG/ML
15 INJECTION, SOLUTION INTRAMUSCULAR; INTRAVENOUS EVERY 6 HOURS
Status: COMPLETED | OUTPATIENT
Start: 2024-11-16 | End: 2024-11-17

## 2024-11-15 RX ORDER — SODIUM PHOSPHATE, DIBASIC AND SODIUM PHOSPHATE, MONOBASIC 7; 19 G/230ML; G/230ML
1 ENEMA RECTAL ONCE AS NEEDED
Status: DISCONTINUED | OUTPATIENT
Start: 2024-11-15 | End: 2024-11-19

## 2024-11-15 RX ORDER — SENNA AND DOCUSATE SODIUM 50; 8.6 MG/1; MG/1
1 TABLET, FILM COATED ORAL 2 TIMES DAILY
COMMUNITY

## 2024-11-15 RX ORDER — LABETALOL HYDROCHLORIDE 5 MG/ML
10 INJECTION, SOLUTION INTRAVENOUS EVERY 4 HOURS PRN
Status: DISCONTINUED | OUTPATIENT
Start: 2024-11-15 | End: 2024-11-19

## 2024-11-15 RX ORDER — MORPHINE SULFATE 2 MG/ML
2 INJECTION, SOLUTION INTRAMUSCULAR; INTRAVENOUS EVERY 2 HOUR PRN
Status: DISCONTINUED | OUTPATIENT
Start: 2024-11-15 | End: 2024-11-19

## 2024-11-15 RX ORDER — HYDROMORPHONE HYDROCHLORIDE 1 MG/ML
0.2 INJECTION, SOLUTION INTRAMUSCULAR; INTRAVENOUS; SUBCUTANEOUS EVERY 5 MIN PRN
Status: DISCONTINUED | OUTPATIENT
Start: 2024-11-15 | End: 2024-11-15 | Stop reason: HOSPADM

## 2024-11-15 RX ORDER — DONEPEZIL HYDROCHLORIDE 10 MG/1
10 TABLET, FILM COATED ORAL DAILY
Status: DISCONTINUED | OUTPATIENT
Start: 2024-11-16 | End: 2024-11-19

## 2024-11-15 RX ORDER — SODIUM CHLORIDE, SODIUM LACTATE, POTASSIUM CHLORIDE, CALCIUM CHLORIDE 600; 310; 30; 20 MG/100ML; MG/100ML; MG/100ML; MG/100ML
INJECTION, SOLUTION INTRAVENOUS CONTINUOUS
Status: DISCONTINUED | OUTPATIENT
Start: 2024-11-15 | End: 2024-11-15 | Stop reason: HOSPADM

## 2024-11-15 RX ORDER — NALOXONE HYDROCHLORIDE 0.4 MG/ML
0.08 INJECTION, SOLUTION INTRAMUSCULAR; INTRAVENOUS; SUBCUTANEOUS AS NEEDED
Status: DISCONTINUED | OUTPATIENT
Start: 2024-11-15 | End: 2024-11-15 | Stop reason: HOSPADM

## 2024-11-15 RX ORDER — LISINOPRIL 5 MG/1
10 TABLET ORAL DAILY
COMMUNITY
Start: 2024-10-27

## 2024-11-15 RX ORDER — ACETAMINOPHEN 500 MG
500 TABLET ORAL 2 TIMES DAILY
COMMUNITY

## 2024-11-15 RX ORDER — SENNOSIDES 8.6 MG
17.2 TABLET ORAL NIGHTLY PRN
Status: DISCONTINUED | OUTPATIENT
Start: 2024-11-15 | End: 2024-11-19

## 2024-11-15 RX ORDER — ACETAMINOPHEN 325 MG/1
650 TABLET ORAL EVERY 6 HOURS PRN
COMMUNITY

## 2024-11-15 RX ORDER — ATORVASTATIN CALCIUM 10 MG/1
10 TABLET, FILM COATED ORAL NIGHTLY
Status: DISCONTINUED | OUTPATIENT
Start: 2024-11-15 | End: 2024-11-19

## 2024-11-15 RX ORDER — POLYETHYLENE GLYCOL 3350 17 G/17G
17 POWDER, FOR SOLUTION ORAL DAILY PRN
Status: DISCONTINUED | OUTPATIENT
Start: 2024-11-15 | End: 2024-11-19

## 2024-11-15 RX ORDER — BUPIVACAINE HYDROCHLORIDE 2.5 MG/ML
INJECTION, SOLUTION EPIDURAL; INFILTRATION; INTRACAUDAL AS NEEDED
Status: DISCONTINUED | OUTPATIENT
Start: 2024-11-15 | End: 2024-11-15 | Stop reason: HOSPADM

## 2024-11-15 RX ORDER — SODIUM CHLORIDE, SODIUM LACTATE, POTASSIUM CHLORIDE, CALCIUM CHLORIDE 600; 310; 30; 20 MG/100ML; MG/100ML; MG/100ML; MG/100ML
INJECTION, SOLUTION INTRAVENOUS CONTINUOUS PRN
Status: DISCONTINUED | OUTPATIENT
Start: 2024-11-15 | End: 2024-11-15 | Stop reason: SURG

## 2024-11-15 RX ORDER — ROCURONIUM BROMIDE 10 MG/ML
INJECTION, SOLUTION INTRAVENOUS AS NEEDED
Status: DISCONTINUED | OUTPATIENT
Start: 2024-11-15 | End: 2024-11-15 | Stop reason: SURG

## 2024-11-15 RX ORDER — HYDRALAZINE HYDROCHLORIDE 20 MG/ML
10 INJECTION INTRAMUSCULAR; INTRAVENOUS EVERY 6 HOURS PRN
Status: DISCONTINUED | OUTPATIENT
Start: 2024-11-15 | End: 2024-11-19

## 2024-11-15 RX ORDER — LABETALOL HYDROCHLORIDE 5 MG/ML
5 INJECTION, SOLUTION INTRAVENOUS EVERY 5 MIN PRN
Status: DISCONTINUED | OUTPATIENT
Start: 2024-11-15 | End: 2024-11-15 | Stop reason: HOSPADM

## 2024-11-15 RX ORDER — GLYCOPYRROLATE 0.2 MG/ML
INJECTION, SOLUTION INTRAMUSCULAR; INTRAVENOUS AS NEEDED
Status: DISCONTINUED | OUTPATIENT
Start: 2024-11-15 | End: 2024-11-15 | Stop reason: SURG

## 2024-11-15 RX ORDER — MORPHINE SULFATE 2 MG/ML
0.5 INJECTION, SOLUTION INTRAMUSCULAR; INTRAVENOUS EVERY 2 HOUR PRN
Status: DISCONTINUED | OUTPATIENT
Start: 2024-11-15 | End: 2024-11-19

## 2024-11-15 RX ORDER — QUETIAPINE FUMARATE 50 MG/1
50 TABLET, FILM COATED ORAL NIGHTLY
COMMUNITY
Start: 2024-10-29

## 2024-11-15 RX ORDER — LABETALOL HYDROCHLORIDE 5 MG/ML
INJECTION, SOLUTION INTRAVENOUS
Status: DISCONTINUED
Start: 2024-11-15 | End: 2024-11-15 | Stop reason: WASHOUT

## 2024-11-15 RX ORDER — LABETALOL HYDROCHLORIDE 5 MG/ML
5 INJECTION, SOLUTION INTRAVENOUS ONCE
Status: COMPLETED | OUTPATIENT
Start: 2024-11-15 | End: 2024-11-15

## 2024-11-15 RX ORDER — BENZONATATE 100 MG/1
100 CAPSULE ORAL EVERY 8 HOURS PRN
COMMUNITY

## 2024-11-15 RX ORDER — HYDROCODONE BITARTRATE AND ACETAMINOPHEN 5; 325 MG/1; MG/1
1 TABLET ORAL EVERY 4 HOURS PRN
Status: DISCONTINUED | OUTPATIENT
Start: 2024-11-15 | End: 2024-11-15

## 2024-11-15 RX ORDER — APIXABAN 2.5 MG/1
2.5 TABLET, FILM COATED ORAL 2 TIMES DAILY
COMMUNITY
Start: 2024-10-29 | End: 2024-11-19

## 2024-11-15 RX ORDER — METOPROLOL TARTRATE 1 MG/ML
INJECTION, SOLUTION INTRAVENOUS AS NEEDED
Status: DISCONTINUED | OUTPATIENT
Start: 2024-11-15 | End: 2024-11-15 | Stop reason: SURG

## 2024-11-15 RX ORDER — ECHINACEA PURPUREA EXTRACT 125 MG
1 TABLET ORAL
Status: DISCONTINUED | OUTPATIENT
Start: 2024-11-15 | End: 2024-11-19

## 2024-11-15 RX ORDER — HYDROCODONE BITARTRATE AND ACETAMINOPHEN 5; 325 MG/1; MG/1
2 TABLET ORAL ONCE AS NEEDED
Status: DISCONTINUED | OUTPATIENT
Start: 2024-11-15 | End: 2024-11-15 | Stop reason: HOSPADM

## 2024-11-15 RX ORDER — QUETIAPINE FUMARATE 25 MG/1
50 TABLET, FILM COATED ORAL NIGHTLY
Status: DISCONTINUED | OUTPATIENT
Start: 2024-11-15 | End: 2024-11-19

## 2024-11-15 RX ORDER — ACETAMINOPHEN 500 MG
1000 TABLET ORAL ONCE AS NEEDED
Status: DISCONTINUED | OUTPATIENT
Start: 2024-11-15 | End: 2024-11-15 | Stop reason: HOSPADM

## 2024-11-15 RX ORDER — ACETAMINOPHEN 10 MG/ML
1000 INJECTION, SOLUTION INTRAVENOUS EVERY 6 HOURS
Status: DISCONTINUED | OUTPATIENT
Start: 2024-11-15 | End: 2024-11-19

## 2024-11-15 RX ORDER — HYDROMORPHONE HYDROCHLORIDE 1 MG/ML
0.4 INJECTION, SOLUTION INTRAMUSCULAR; INTRAVENOUS; SUBCUTANEOUS EVERY 5 MIN PRN
Status: DISCONTINUED | OUTPATIENT
Start: 2024-11-15 | End: 2024-11-15 | Stop reason: HOSPADM

## 2024-11-15 RX ORDER — NEOSTIGMINE METHYLSULFATE 1 MG/ML
INJECTION INTRAVENOUS AS NEEDED
Status: DISCONTINUED | OUTPATIENT
Start: 2024-11-15 | End: 2024-11-15 | Stop reason: SURG

## 2024-11-15 RX ORDER — POLYETHYLENE GLYCOL 3350 17 G/17G
17 POWDER, FOR SOLUTION ORAL DAILY
COMMUNITY

## 2024-11-15 RX ORDER — SODIUM CHLORIDE 9 MG/ML
INJECTION, SOLUTION INTRAVENOUS CONTINUOUS
Status: DISCONTINUED | OUTPATIENT
Start: 2024-11-15 | End: 2024-11-16

## 2024-11-15 RX ORDER — HYDROCODONE BITARTRATE AND ACETAMINOPHEN 5; 325 MG/1; MG/1
1 TABLET ORAL ONCE AS NEEDED
Status: DISCONTINUED | OUTPATIENT
Start: 2024-11-15 | End: 2024-11-15 | Stop reason: HOSPADM

## 2024-11-15 RX ORDER — ONDANSETRON 2 MG/ML
4 INJECTION INTRAMUSCULAR; INTRAVENOUS EVERY 6 HOURS PRN
Status: DISCONTINUED | OUTPATIENT
Start: 2024-11-15 | End: 2024-11-19

## 2024-11-15 RX ORDER — BENZONATATE 100 MG/1
200 CAPSULE ORAL 3 TIMES DAILY PRN
Status: DISCONTINUED | OUTPATIENT
Start: 2024-11-15 | End: 2024-11-19

## 2024-11-15 RX ORDER — ACETAMINOPHEN 500 MG
500 TABLET ORAL EVERY 4 HOURS PRN
Status: DISCONTINUED | OUTPATIENT
Start: 2024-11-15 | End: 2024-11-19

## 2024-11-15 RX ORDER — CEFAZOLIN SODIUM 1 G/3ML
INJECTION, POWDER, FOR SOLUTION INTRAMUSCULAR; INTRAVENOUS AS NEEDED
Status: DISCONTINUED | OUTPATIENT
Start: 2024-11-15 | End: 2024-11-15 | Stop reason: SURG

## 2024-11-15 RX ORDER — BISACODYL 5 MG/1
10 TABLET, DELAYED RELEASE ORAL EVERY 12 HOURS PRN
COMMUNITY

## 2024-11-15 RX ORDER — HYDRALAZINE HYDROCHLORIDE 10 MG/1
10 TABLET, FILM COATED ORAL EVERY 6 HOURS PRN
COMMUNITY

## 2024-11-15 RX ORDER — HYDROMORPHONE HYDROCHLORIDE 1 MG/ML
0.6 INJECTION, SOLUTION INTRAMUSCULAR; INTRAVENOUS; SUBCUTANEOUS EVERY 5 MIN PRN
Status: DISCONTINUED | OUTPATIENT
Start: 2024-11-15 | End: 2024-11-15 | Stop reason: HOSPADM

## 2024-11-15 RX ORDER — TRAMADOL HYDROCHLORIDE 50 MG/1
50 TABLET ORAL EVERY 6 HOURS SCHEDULED
Status: DISCONTINUED | OUTPATIENT
Start: 2024-11-16 | End: 2024-11-19

## 2024-11-15 RX ORDER — LISINOPRIL 5 MG/1
5 TABLET ORAL DAILY
Status: DISCONTINUED | OUTPATIENT
Start: 2024-11-16 | End: 2024-11-19

## 2024-11-15 RX ORDER — MEMANTINE HYDROCHLORIDE 10 MG/1
10 TABLET ORAL 2 TIMES DAILY
Status: DISCONTINUED | OUTPATIENT
Start: 2024-11-15 | End: 2024-11-19

## 2024-11-15 RX ORDER — LEVOTHYROXINE SODIUM 75 UG/1
75 TABLET ORAL
Status: DISCONTINUED | OUTPATIENT
Start: 2024-11-16 | End: 2024-11-19

## 2024-11-15 RX ORDER — PHENYLEPHRINE HCL 10 MG/ML
VIAL (ML) INJECTION AS NEEDED
Status: DISCONTINUED | OUTPATIENT
Start: 2024-11-15 | End: 2024-11-15 | Stop reason: SURG

## 2024-11-15 RX ORDER — BISACODYL 10 MG
10 SUPPOSITORY, RECTAL RECTAL
Status: DISCONTINUED | OUTPATIENT
Start: 2024-11-15 | End: 2024-11-19

## 2024-11-15 RX ORDER — ONDANSETRON 2 MG/ML
INJECTION INTRAMUSCULAR; INTRAVENOUS AS NEEDED
Status: DISCONTINUED | OUTPATIENT
Start: 2024-11-15 | End: 2024-11-15 | Stop reason: SURG

## 2024-11-15 RX ORDER — LIDOCAINE HYDROCHLORIDE 10 MG/ML
INJECTION, SOLUTION EPIDURAL; INFILTRATION; INTRACAUDAL; PERINEURAL AS NEEDED
Status: DISCONTINUED | OUTPATIENT
Start: 2024-11-15 | End: 2024-11-15 | Stop reason: SURG

## 2024-11-15 RX ORDER — ATORVASTATIN CALCIUM 10 MG/1
10 TABLET, FILM COATED ORAL NIGHTLY
COMMUNITY

## 2024-11-15 RX ORDER — IPRATROPIUM BROMIDE 42 UG/1
1 SPRAY, METERED NASAL 4 TIMES DAILY
Status: DISCONTINUED | OUTPATIENT
Start: 2024-11-15 | End: 2024-11-19

## 2024-11-15 RX ORDER — LIDOCAINE HYDROCHLORIDE AND EPINEPHRINE 10; 10 MG/ML; UG/ML
INJECTION, SOLUTION INFILTRATION; PERINEURAL AS NEEDED
Status: DISCONTINUED | OUTPATIENT
Start: 2024-11-15 | End: 2024-11-15 | Stop reason: HOSPADM

## 2024-11-15 RX ORDER — MORPHINE SULFATE 2 MG/ML
1 INJECTION, SOLUTION INTRAMUSCULAR; INTRAVENOUS EVERY 2 HOUR PRN
Status: DISCONTINUED | OUTPATIENT
Start: 2024-11-15 | End: 2024-11-19

## 2024-11-15 RX ADMIN — METOPROLOL TARTRATE 1 MG: 1 INJECTION, SOLUTION INTRAVENOUS at 18:59:00

## 2024-11-15 RX ADMIN — SODIUM CHLORIDE, SODIUM LACTATE, POTASSIUM CHLORIDE, CALCIUM CHLORIDE: 600; 310; 30; 20 INJECTION, SOLUTION INTRAVENOUS at 18:09:00

## 2024-11-15 RX ADMIN — PHENYLEPHRINE HCL 100 MCG: 10 MG/ML VIAL (ML) INJECTION at 18:36:00

## 2024-11-15 RX ADMIN — ROCURONIUM BROMIDE 50 MG: 10 INJECTION, SOLUTION INTRAVENOUS at 18:09:00

## 2024-11-15 RX ADMIN — CEFAZOLIN SODIUM 2 G: 1 INJECTION, POWDER, FOR SOLUTION INTRAMUSCULAR; INTRAVENOUS at 18:15:00

## 2024-11-15 RX ADMIN — GLYCOPYRROLATE 0.4 MG: 0.2 INJECTION, SOLUTION INTRAMUSCULAR; INTRAVENOUS at 21:14:00

## 2024-11-15 RX ADMIN — LIDOCAINE HYDROCHLORIDE 50 MG: 10 INJECTION, SOLUTION EPIDURAL; INFILTRATION; INTRACAUDAL; PERINEURAL at 18:09:00

## 2024-11-15 RX ADMIN — NEOSTIGMINE METHYLSULFATE 2 MG: 1 INJECTION INTRAVENOUS at 21:14:00

## 2024-11-15 RX ADMIN — LABETALOL HYDROCHLORIDE 5 MG: 5 INJECTION, SOLUTION INTRAVENOUS at 19:10:00

## 2024-11-15 RX ADMIN — ONDANSETRON 4 MG: 2 INJECTION INTRAMUSCULAR; INTRAVENOUS at 20:34:00

## 2024-11-15 NOTE — ED INITIAL ASSESSMENT (HPI)
Patient sent in by [facility] for hypertension. Blood pressure before meds this morning 182/112, after scheduled lisinopril 10 mg and PRN hydralazine 10 mg, no improvement to BP. BP en route with [facility] 172/103. Patient has baseline dementia, poor historian. VSS apart from elevated BP. Oriented x 1. Denies pain, states she is \"just enjoying my life\". On Eliquis BID per paperwork states \"DVT prophylaxis\".

## 2024-11-15 NOTE — H&P
[facility]     Mar Hernandez Patient Status:  Emergency    1940 MRN [id number]   [facility] ED EMERGENCY DEPARTMENT Attending Louise Polk MD   Hosp Day # 0 PCP Dorothy Owens MD     Chief Complaint: Elevated BP    Subjective:    History of Present Illness:   Mar Hernandez is a 84 year old female with PMHx severe dementia, anxiety, hypothyroidism, hypertension, hyperlipidemia and PE on eliquis who presents from [facility] with elevated blood pressure.  Patient is a poor historian due to severe dementia and her son and daughter-in-law are at bedside.  They do report she complained of abdominal soreness.  No reported fever, chills, nausea, vomiting, diarrhea or shortness of breath.  At the nursing home, blood pressure was noted to be elevated this morning and when EMS arrived it was 182/112.  She was given her home lisinopril along with as needed hydralazine prior to arrival.  In the ER, blood pressure is around 170 systolic.  Labs show lactic acid 3.78 WBC 23.9.  CT abdomen/pelvis concerning for acute cholecystitis.  She was given sepsis bolus and Zosyn and subsequently admitted with general surgery on consult.    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 She has never used smokeless tobacco. She reports that she does  not currently use alcohol after a past usage of about 1.0 standard drink of alcohol per week. She reports that she does not use drugs.     Allergies: Allergies[1]    Medications:  Medications Ordered Prior to Encounter[2]    Review of Systems:   A comprehensive review of systems was completed.    Pertinent positives and negatives noted in the HPI.    Objective:   Physical Exam:    BP (!) 175/96   Pulse 97   Temp 97.7 °F (36.5 °C) (Temporal)   Resp 26   Ht 5' 7\" (1.702 m)   Wt 170 lb (77.1 kg)   SpO2 93%   BMI 26.63 kg/m²   General: No acute distress, alert but with baseline dementia  Respiratory: No rhonchi, no wheezes  Cardiovascular: S1, S2. Regular rate and rhythm  Abdomen: Soft, RUQ tenderness, non-distended, positive bowel sounds  Extremities: No edema    Results:    Labs:      Labs Last 24 Hours:  Recent Labs   Lab 11/15/24  1010   RBC 5.07   HGB 15.4   HCT 46.0   MCV 90.7   MCH 30.4   MCHC 33.5   RDW 12.5   NEPRELIM 20.23*   WBC 23.9*   .0     Recent Labs   Lab 11/15/24  1010   *   BUN 18   CREATSERUM 0.98   EGFRCR 58*   CA 11.0*   ALB 4.4      K 3.8      CO2 25.0   ALKPHO 95   AST 23   ALT 28   BILT 0.9   TP 8.1     Lab Results   Component Value Date    INR 1.00 02/03/2024     Recent Labs   Lab 11/15/24  1010   TROPHS 7     No results for input(s): \"TROP\", \"PBNP\" in the last 168 hours.    No results for input(s): \"PCT\" in the last 168 hours.    Imaging: Imaging data reviewed in Epic.    Assessment & Plan:      #Acute cholecystitis  -NPO, IVF, pain control, PRN-antiemetics  -General surgery consulted    #Sepsis due to above  #Lactic acidosis  -S/p sepsis bolus in ER  -Trend lactic acid  -Follow blood cultures  -Zosyn    #Leukocytosis due to above  -Monitor    #Severe dementia with behavioral disturbances  #Anxiety  -Patient was in memory care since 2020 but transferred to skilled nursing after hip fracture surgery in January 2024  -Resume home donezepil, memantine, trazodone  and Seroquel    #Hypothyroidism  -Levothyroxine    #Hypertension  -Continue home lisinopril  -PRN hydralazine and labetalol ordered    #Hyperlipidemia  -Statin    #PE  -Hold Eliquis       Plan of care discussed with patient and her family.    Ambrose Chatterjee,     Supplementary Documentation:     The 21st Century Cures Act makes medical notes like these available to patients in the interest of transparency. Please be advised this is a medical document. Medical documents are intended to carry relevant information, facts as evident, and the clinical opinion of the practitioner. The medical note is intended as peer to peer communication and may appear blunt or direct. It is written in medical language and may contain abbreviations or verbiage that are unfamiliar.                     [1] No Known Allergies  [2]   No current facility-administered medications on file prior to encounter.     Current Outpatient Medications on File Prior to Encounter   Medication Sig Dispense Refill    QUEtiapine (SEROQUEL) 25 MG Oral Tab Take 1 tablet (25 mg total) by mouth nightly.      traZODone 50 MG Oral Tab Take 1 tablet (50 mg total) by mouth nightly.      FLUoxetine (PROZAC) 10 MG Oral Cap Take 1 capsule (10 mg total) by mouth daily. 90 capsule 3    fexofenadine 180 MG Oral Tab Take 1 tablet (180 mg total) by mouth daily. One tab po q am prn sinus headache or PND. 30 tablet 0    Ipratropium Bromide 0.06 % Nasal Solution 2 sprays by Nasal route 4 (four) times daily. 1 each 0    guaiFENesin  MG Oral Tablet 12 Hr Take 2 tablets (1,200 mg total) by mouth 2 (two) times daily. 60 tablet 5    Nystatin 043116 UNIT/GM External Powder Apply under both breast 3 times daily x30 days 60 g 1    MEMANTINE HCL 10 MG Oral Tab TAKE 1 TAB BY MOUTH TWICE DAILY 90 tablet 3    LEVOTHYROXINE SODIUM 75 MCG Oral Tab TAKE 1 TAB BY MOUTH EVERY MORNING BEFORE BREAKFAST 90 tablet 0    CALCIUM + VITAMIN D3 600-10 MG-MCG Oral Tab TAKE 1 TAB BY MOUTH TWICE  DAILY 90 tablet 3    VITAMIN B-12 250 MCG Oral Tab TAKE 1 TAB BY MOUTH DAILY (Patient taking differently: Take 2 tablets (500 mcg total) by mouth daily.) 90 tablet 3    simvastatin 20 MG Oral Tab Take 1 tablet (20 mg total) by mouth nightly. 90 tablet 2    Donepezil HCl 10 MG Oral Tab Take 1 tablet (10 mg total) by mouth daily. 90 tablet 3    Multiple Vitamins-Minerals (PRESERVISION AREDS) Oral Tab Take 1 capsule by mouth 2 (two) times daily. 180 tablet 3    acetaminophen 500 MG Oral Tab Take 1 tablet (500 mg total) by mouth every 6 (six) hours as needed for Pain. 90 tablet 3

## 2024-11-15 NOTE — ED PROVIDER NOTES
Patient Seen in: [facility] Emergency Department      History     Chief Complaint   Patient presents with    Hypertension     Stated Complaint:     Subjective:   HPI      84-year-old female with a past medical history as below including severe dementia, hypertension and PE on Eliquis brought by EMS from nursing home due to elevated blood pressure.  Per EMS report, blood pressure was elevated at 182/112 at the nursing home and she was given lisinopril along with as needed hydralazine prior to arrival.  Patient at baseline mental status per report.  Patient unable to provide any significant history due to dementia.  She denies any pain.    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    Skin surgery Left 04/27/2016    ED&C of SCCIS to L Upper Arm by SD    Skin surgery Left 04/27/2016    ED&C of SCCIS to L Upper Back by SD                Social History     Socioeconomic History    Marital status:    Tobacco Use    Smoking status: Never    Smokeless tobacco: Never   Substance and Sexual Activity    Alcohol use: Not Currently     Alcohol/week: 1.0 standard drink of alcohol     Types: 1 Glasses of wine per week     Comment: 1-2 per week    Drug use: No   Other Topics Concern    Caffeine Concern Yes     Comment: 1-2 per day    Exercise No    Seat Belt Yes     Social Drivers of Health     Food Insecurity: No Food Insecurity (2/3/2024)    Food Insecurity     Food Insecurity: Never true   Transportation Needs: No Transportation Needs (2/3/2024)    Transportation Needs     Lack of Transportation: No   Housing Stability: Low Risk  (2/3/2024)    Housing Stability     Housing Instability: No                  Physical Exam     ED Triage Vitals [11/15/24 1002]    BP (!) 178/87   Pulse 99   Resp 16   Temp 97.7 °F (36.5 °C)   Temp src Temporal   SpO2 94 %   O2 Device None (Room air)       Current Vitals:   Vital Signs  BP: (!) 176/100  Pulse: 100  Resp: 24  Temp: 97.7 °F (36.5 °C)  Temp src: Temporal  MAP (mmHg): (!) 121    Oxygen Therapy  SpO2: 92 %  O2 Device: None (Room air)        Physical Exam  Vitals and nursing note reviewed.   Constitutional:       Appearance: She is well-developed.   HENT:      Head: Normocephalic and atraumatic.      Mouth/Throat:      Mouth: Mucous membranes are moist.   Eyes:      General: No scleral icterus.  Cardiovascular:      Rate and Rhythm: Normal rate and regular rhythm.   Pulmonary:      Effort: Pulmonary effort is normal.      Breath sounds: Normal breath sounds.   Skin:     General: Skin is warm and dry.   Neurological:      General: No focal deficit present.      Mental Status: She is alert.      Cranial Nerves: No cranial nerve deficit.      Motor: No weakness.      Comments: Oriented x 1 to name  Moves all extremities to command   Psychiatric:         Mood and Affect: Mood normal.         Behavior: Behavior normal.             ED Course     Labs Reviewed   COMP METABOLIC PANEL (14) - Abnormal; Notable for the following components:       Result Value    Glucose 172 (*)     Calcium, Total 11.0 (*)     eGFR-Cr 58 (*)     Globulin  3.7 (*)     All other components within normal limits   CBC WITH DIFFERENTIAL WITH PLATELET - Abnormal; Notable for the following components:    WBC 23.9 (*)     Neutrophil Absolute Prelim 20.23 (*)     Neutrophil Absolute 20.23 (*)     Monocyte Absolute 2.25 (*)     All other components within normal limits   URINALYSIS WITH CULTURE REFLEX - Abnormal; Notable for the following components:    Protein Urine 30 (*)     RBC Urine 3-5 (*)     Squamous Epi. Cells Few (*)     All other components within normal limits   LACTIC ACID, PLASMA - Abnormal; Notable for the following components:    Lactic Acid 3.7 (*)      All other components within normal limits   TROPONIN I HIGH SENSITIVITY - Normal   SCAN SLIDE   LACTIC ACID REFLEX POST POSTIVE   BLOOD CULTURE   BLOOD CULTURE     EKG    Rate, intervals and axes as noted on EKG Report.  Rate: 97  Rhythm: Sinus Rhythm  Reading: Normal sinus rhythm, mild criteria for LVH, septal and inferior Q waves, no previous for comparison                CT ABDOMEN+PELVIS(CONTRAST ONLY)(CPT=74177)    Result Date: 11/15/2024  CONCLUSION:  1. Findings concerning for acute cholecystitis 2. Small right pleural effusion with bibasilar atelectatic change  This report was communicated by telephone to Dr. Polk at the dictation time shown below.   LOCATION:  [facility]   Dictated by (CST): Sen Bird MD on 11/15/2024 at 1:35 PM     Finalized by (CST): Sen Bird MD on 11/15/2024 at 1:39 PM        A total of 65 minutes of critical care time (exclusive of billable procedures) was administered to manage the patient's critical lab values and critical imaging findings due to her sepsis due to acute cholecystitis.  This involved direct patient intervention, complex decision making, and/or extensive discussions with the patient, family, and clinical staff.       MDM      84-year-old female with a past medical history as below including severe dementia, hypertension and PE on Eliquis brought by EMS from nursing home due to elevated blood pressure.      Differential includes but is not limited to essential hypertension, hypertensive urgency.  Will check labs to evaluate for endorgan damage      Labs show significantly elevated WBC 23.9 with left shift.  Chart review shows WBC was normal at 6.2 on 10/29/2024.  Labs are otherwise unremarkable with normal electrolytes, renal function, LFTs and UA.    Patient was reevaluated and continues to deny any pain but she does have tenderness with palpation to her right abdomen greatest in the right upper quadrant.  Blood cultures, lactic acid and CT abdomen/pelvis  ordered.    1:35 PM  Lactic acid elevated at 3.7.  Independent interpretation of CT abdomen/pelvis shows distended gallbladder with inflammatory changes.  Discussed with radiologist reports finding concerning for acute cholecystitis.    Patient treated for sepsis with 30 mL/kg fluid bolus and Zosyn.  General surgery Dr. Garcia paged and received call back from circulating nurse who states that she is in surgery and will inform her.  Discussed with hospitalist Dr. Chatterjee.      Admission disposition: 11/15/2024  1:43 PM         Medical Decision Making  Amount and/or Complexity of Data Reviewed  Independent Historian: EMS     Details: See HPI  Labs: ordered. Decision-making details documented in ED Course.  Radiology: ordered and independent interpretation performed. Decision-making details documented in ED Course.  ECG/medicine tests: ordered and independent interpretation performed. Decision-making details documented in ED Course.  Discussion of management or test interpretation with external provider(s): Radiology, general surgery, hospitalist    Risk  Parenteral controlled substances.  Decision regarding hospitalization.        Disposition and Plan     Clinical Impression:  1. Acute cholecystitis    2. Sepsis, due to unspecified organism, unspecified whether acute organ dysfunction present (HCC)         Disposition:  Admit  11/15/2024  1:43 pm    Follow-up:  No follow-up provider specified.        Medications Prescribed:  Current Discharge Medication List              Supplementary Documentation:     [facility]   [facility] of [facility]      Sepsis Reassessment Note    BP (!) 175/96   Pulse 97   Temp 97.7 °F (36.5 °C) (Temporal)   Resp 26   Ht 170.2 cm (5' 7\")   Wt 77.1 kg   SpO2 93%   BMI 26.63 kg/m²      I completed the sepsis reassessment at 2:31 PM      Cardiac:  Regularity: Regular  Rate: Normal  Heart Sounds: S1,S2    Lungs:   Right: Clear  Left: Clear    Peripheral Pulses:  Radial: Right 1+ or  Left 1+      Capillary Refill:  <3 Secs    Skin:  Temp/Moisture: Warm and Dry  Color: Normal      Louise Polk MD  11/15/2024  1:55 PM       Hospital Problems       Present on Admission  Date Reviewed: 2/19/2024            ICD-10-CM Noted POA    * (Principal) Acute cholecystitis K81.0 11/15/2024 Unknown

## 2024-11-15 NOTE — CONSULTS
[facility]  Report of Consultation    Mar Hernandez Patient Status:  Inpatient    1940 MRN [id number]   Location [facility] 3NW-A Attending Ambrose Chatterjee,    Hosp Day # 0 PCP Dorothy Owens MD     Requesting Physician:  Dr. Polk    Reason for Consultation:  Acute cholecystitis     Chief Complaint:  Abdominal pain    History of Present Illness:  Mar Hernandez is a 84 year old female who presents to [facility] via EMS from [facility] for high blood pressure and abdominal pain.    The patient is a very poor historian due to severe dementia.  Family is at bedside aiding in providing the HPI.    Family states the patient began complaining of abdominal pain today.  She denies nausea, vomiting, fevers or chills.  She denies changes in her bowel movements.    Upon presentation to the hospital, the patient was afebrile, slightly tachycardic at 100, hypertensive at 178/87.  CBC reveals leukocytosis at 23.9 with left shift of 20.23, hemoglobin 15.4, platelets 199,000.  Hyperglycemic at 172.  No electrolyte abnormalities.  No acute kidney injury.  No elevated liver enzymes.  Total bilirubin within normal limits at 0.9.  UA negative for UTI.  Lactic acid elevated at 3.7.    CT of the abdomen and pelvis reveals a markedly distended gallbladder with wall thickening and cholelithiasis.  There is inflammatory change along the inferior aspect of the gallbladder.  A gallstone is noted in the neck of the gallbladder.  Findings are consistent with acute cholecystitis.    The patient has a significant past surgical history of open hysterectomy in the 90s.  She has a significant past medical history of hypertension, hyperlipidemia, hypothyroidism, PE on Eliquis.    The patient underwent left hip hemiarthroplasty at [facility] on 24 with Dr. Heck.  Postoperatively, she was found to have acute PE and discharged home on Eliquis BID. The family is not sure when her last dose of Eliquis is.      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 She has never used smokeless tobacco. She reports that she does not currently use alcohol after a past usage of about 1.0 standard drink of alcohol per week. She reports that she does not use drugs.    Allergies:  Allergies[1]    Medications:  Medications Prior to Admission   Medication Sig    acetaminophen 500 MG Oral Tab Take 1 tablet (500 mg total) by mouth 2 (two) times daily.    acetaminophen 325 MG Oral Tab Take 2 tablets (650 mg total) by mouth every 6 (six) hours as needed for Pain or Fever.    atorvastatin 10 MG Oral Tab Take 1 tablet (10 mg total) by mouth at bedtime.    bisacodyl 5 MG Oral Tab EC Take 2 tablets (10 mg total) by mouth every 12 (twelve) hours as needed for constipation.    ELIQUIS 2.5 MG Oral Tab Take 1 tablet (2.5 mg total) by mouth 2 (two) times daily.    hydrALAZINE 10 MG Oral Tab Take 1 tablet (10 mg total) by mouth every 6 (six) hours as needed (for SBP > 160).    lisinopril 5 MG Oral Tab Take 1 tablet (5 mg total) by mouth daily.    polyethylene glycol, PEG 3350, 17 g Oral Powd Pack Take 17 g by mouth daily.    senna-docusate 8.6-50 MG Oral Tab Take 1 tablet by mouth 2 (two) times daily.    QUEtiapine 50 MG Oral Tab Take 1 tablet (50 mg total) by mouth at bedtime.    benzonatate 100 MG Oral Cap Take 1 capsule (100 mg total) by mouth  every 8 (eight) hours as needed for cough.    traZODone 25 mg Oral Tab Take 1 Partial Tablet (25 mg total) by mouth at bedtime.    VITAMIN B-12 250 MCG Oral Tab TAKE 1 TAB BY MOUTH DAILY    fexofenadine 180 MG Oral Tab Take 1 tablet (180 mg total) by mouth Q24H PRN (for sinus headaches, post nasal drip).    Ipratropium Bromide 0.06 % Nasal Solution 1 spray by Nasal route 4 (four) times daily.    MEMANTINE HCL 10 MG Oral Tab TAKE 1 TAB BY MOUTH TWICE DAILY    LEVOTHYROXINE SODIUM 75 MCG Oral Tab TAKE 1 TAB BY MOUTH EVERY MORNING BEFORE BREAKFAST    CALCIUM + VITAMIN D3 600-10 MG-MCG Oral Tab TAKE 1 TAB BY MOUTH TWICE DAILY    Donepezil HCl 10 MG Oral Tab Take 1 tablet (10 mg total) by mouth daily.    Multiple Vitamins-Minerals (PRESERVISION AREDS) Oral Tab Take 1 capsule by mouth 2 (two) times daily.         Current Facility-Administered Medications:     sodium chloride 0.9 % IV bolus 2,313 mL, 30 mL/kg, Intravenous, Continuous    piperacillin-tazobactam (Zosyn) 4.5 g in dextrose 5% 100 mL IVPB-ADDV, 4.5 g, Intravenous, Once    Review of Systems:  Pertinent items are noted in HPI.  Allergic/Immuno:  Review of patient's allergies performed.  Cardiovascular:  Negative for cool extremity and irregular heartbeat/palpitations  Constitutional:  Negative for decreased activity, fever, irritability and lethargy  Endocrine:  Negative for abnormal sleep patterns, increased activity, polydipsia and polyphagia  ENMT:  Negative for ear drainage, hearing loss and nasal drainage  Eyes:  Negative for eye discharge and vision loss  Gastrointestinal:  Positive for abdominal pain. Negative for constipation, decreased appetite, diarrhea and vomiting  Genitourinary:  Negative for dysuria and hematuria  Hema/Lymph:  Negative for easy bleeding and easy bruising  Integumentary:  Negative for pruritus and rash  Musculoskeletal:  Negative for bone/joint symptoms  Neurological:  Negative for gait disturbance  Psychiatric:  Negative for  inappropriate interaction and psychiatric symptoms  Respiratory:  Negative for cough, dyspnea and wheezing      Physical Exam:  Blood pressure (!) 176/100, pulse 100, temperature 97.7 °F (36.5 °C), temperature source Temporal, resp. rate 24, height 67\", weight 170 lb (77.1 kg), SpO2 92%.    General: Alert, orientated x1.  Cooperative.  No apparent distress.    HEENT: Exam is unremarkable.  Without scleral icterus.  Mucous membranes are moist. EOM are WNL.    Neck: No tenderness to palpitation.  Full range of motion to flexion and extension, lateral rotation and lateral flexion of cervical spine.  No JVD. Supple.     Lungs: Clear to auscultation bilaterally. No wheezes, no rales, no rhonchi. Good excursion of the diaphragms. No secondary use of accessory respiratory musculature.    Cardiac: Regular rate and rhythm. No murmur.    Abdomen:  Soft, distended, tender to palpation in epigastrium and RUQ, tympanic to percussion. No peritoneal signs. No guarding.     Extremities:  No lower extremity edema noted.  Without clubbing or cyanosis.      Skin: Normal texture and turgor.    Laboratory Data:  Recent Labs   Lab 11/15/24  1010   RBC 5.07   HGB 15.4   HCT 46.0   MCV 90.7   MCH 30.4   MCHC 33.5   RDW 12.5   NEPRELIM 20.23*   WBC 23.9*   .0       Recent Labs   Lab 11/15/24  1010   *   BUN 18   CREATSERUM 0.98   CA 11.0*   ALB 4.4      K 3.8      CO2 25.0   ALKPHO 95   AST 23   ALT 28   BILT 0.9   TP 8.1         No results for input(s): \"PTP\", \"INR\", \"PTT\" in the last 168 hours.      CT ABDOMEN+PELVIS(CONTRAST ONLY)(CPT=74177)    Result Date: 11/15/2024  CONCLUSION:  1. Findings concerning for acute cholecystitis 2. Small right pleural effusion with bibasilar atelectatic change  This report was communicated by telephone to Dr. Polk at the dictation time shown below.   LOCATION:  [facility]   Dictated by (CST): Sen Bird MD on 11/15/2024 at 1:35 PM     Finalized by (CST): Sen Bird MD on  11/15/2024 at 1:39 PM          BRITTA Thomson  11/15/2024  2:30 PM    Is this a shared or split note between Advanced Practice Provider and Physician? Yes      Medical Decision Making         Impression:  Patient Active Problem List   Diagnosis    Primary hypertension    Acquired hypothyroidism    Intermediate stage nonexudative age-related macular degeneration of both eyes    Severe dementia with agitation (HCC)    Physical deconditioning    Closed fracture of left hip, initial encounter (HCC)    Nausea and vomiting    Atelectasis    Hypoxia    Acute hypoxemic respiratory failure (HCC)    Pulmonary embolus (HCC)    Acute cholecystitis    Sepsis, due to unspecified organism, unspecified whether acute organ dysfunction present (HCC)       This is an 84-year-old woman with acute cholecystitis    Patient came into the hospital with abdominal pain  CT imaging was done and showed a dilated gallbladder with pericholecystic fat stranding  CT images were reviewed personally by me  Patient also found to have leukocytosis to 23  Patient has significant past medical history of severe dementia, late stage Alzheimer's  Patient also has a history of DVT and takes Eliquis, last dose was yesterday evening    Based on patient's imaging and leukocytosis, intervention to alleviate the infection of her gallbladder is indicated  Patient does have late stage Alzheimer's so cholecystostomy tube placement most likely is not appropriate  Surgery to remove the gallbladder give the patient highest chance of cure  I recommend proceeding to the OR with laparoscopic cholecystectomy with intraoperative cholangiogram  Patient will need reversal of her Eliquis  The procedure was discussed with the patient's daughter  Risks include bleeding, pain, infection, injury to anything abdomen, bile leak, injury to the common bile duct, and need for further intervention  Patient's daughter acknowledged understanding and agreed to the plan    Continue IV  antibiotics  Diet as tolerated postoperatively  Surgery will continue to follow  Rest of care per primary    Sumaya Garcia MD  [facility] General Surgery  11/15/2024  5:59 PM         [1] No Known Allergies

## 2024-11-15 NOTE — PROGRESS NOTES
NURSING ADMISSION NOTE      Patient admitted via Cart  Oriented to room.  Safety precautions initiated.  Bed in low position.  Call light in reach.    Patient arrived to the floor via cart from ER in stable condition at 1453.     ** 2 person skin check completed with JUNIOR Edwards. Redness noted to sacrum-pictures taken and are in flowsheets.    1650: Patient transported to surgery in stable condition with Kcentra infusing.

## 2024-11-15 NOTE — ED QUICK NOTES
Daughter in law Smitha at bedside, updated on plan of care. Denies further questions/concerns at this time.

## 2024-11-15 NOTE — ED QUICK NOTES
Orders for admission, patient is aware of plan and ready to go upstairs. Any questions, please call ED RN Diana at extension [phone].     Patient Covid vaccination status: Fully vaccinated     COVID Test Ordered in ED: None    COVID Suspicion at Admission: N/A    Running Infusions:    sodium chloride 2,313 mL (11/15/24 1344)        Mental Status/LOC at time of transport: oriented to self, baseline dementia    Other pertinent information: family at bedside, ongoing sepsis bolus  CIWA score: N/A   NIH score:  N/A

## 2024-11-15 NOTE — ED QUICK NOTES
Rounding Completed    Plan of Care reviewed. Waiting for continued blood pressure monitoring.  Elimination needs assessed.  Provided warm blanket and verbal reassurance.    Bed is locked and in lowest position. Call light within reach.

## 2024-11-15 NOTE — ED QUICK NOTES
Patient reporting pain to abdomen upon awakening. MD notified, orders received for morphine. RN spoke with patient's daughter in law who stated patient has tolerated morphine in the past and is ok to receive it.

## 2024-11-15 NOTE — ED QUICK NOTES
RN spoke with Shelli Khan at [facility] to provide update. Per Shelli, patient's baseline BP is systolic in the 150s, diastolic in the 80s.     Shelli's direct line for updates: [phone]

## 2024-11-16 ENCOUNTER — APPOINTMENT (OUTPATIENT)
Dept: NUCLEAR MEDICINE | Facility: HOSPITAL | Age: 84
End: 2024-11-16
Payer: MEDICARE

## 2024-11-16 ENCOUNTER — APPOINTMENT (OUTPATIENT)
Dept: NUCLEAR MEDICINE | Facility: HOSPITAL | Age: 84
DRG: 853 | End: 2024-11-16
Payer: MEDICARE

## 2024-11-16 LAB
ANION GAP SERPL CALC-SCNC: 6 MMOL/L (ref 0–18)
BASOPHILS # BLD AUTO: 0.02 X10(3) UL (ref 0–0.2)
BASOPHILS NFR BLD AUTO: 0.1 %
BUN BLD-MCNC: 19 MG/DL (ref 9–23)
CALCIUM BLD-MCNC: 9.1 MG/DL (ref 8.7–10.4)
CHLORIDE SERPL-SCNC: 110 MMOL/L (ref 98–112)
CO2 SERPL-SCNC: 24 MMOL/L (ref 21–32)
CREAT BLD-MCNC: 1.1 MG/DL
EGFRCR SERPLBLD CKD-EPI 2021: 50 ML/MIN/1.73M2 (ref 60–?)
EOSINOPHIL # BLD AUTO: 0 X10(3) UL (ref 0–0.7)
EOSINOPHIL NFR BLD AUTO: 0 %
ERYTHROCYTE [DISTWIDTH] IN BLOOD BY AUTOMATED COUNT: 13 %
GLUCOSE BLD-MCNC: 111 MG/DL (ref 70–99)
GLUCOSE BLD-MCNC: 131 MG/DL (ref 70–99)
HCT VFR BLD AUTO: 38.1 %
HGB BLD-MCNC: 12.8 G/DL
IMM GRANULOCYTES # BLD AUTO: 0.15 X10(3) UL (ref 0–1)
IMM GRANULOCYTES NFR BLD: 0.8 %
LACTATE SERPL-SCNC: 2.3 MMOL/L (ref 0.5–2)
LYMPHOCYTES # BLD AUTO: 1.81 X10(3) UL (ref 1–4)
LYMPHOCYTES NFR BLD AUTO: 9.4 %
MCH RBC QN AUTO: 31 PG (ref 26–34)
MCHC RBC AUTO-ENTMCNC: 33.6 G/DL (ref 31–37)
MCV RBC AUTO: 92.3 FL
MONOCYTES # BLD AUTO: 1.91 X10(3) UL (ref 0.1–1)
MONOCYTES NFR BLD AUTO: 10 %
NEUTROPHILS # BLD AUTO: 15.3 X10 (3) UL (ref 1.5–7.7)
NEUTROPHILS # BLD AUTO: 15.3 X10(3) UL (ref 1.5–7.7)
NEUTROPHILS NFR BLD AUTO: 79.7 %
OSMOLALITY SERPL CALC.SUM OF ELEC: 294 MOSM/KG (ref 275–295)
PLATELET # BLD AUTO: 154 10(3)UL (ref 150–450)
POTASSIUM SERPL-SCNC: 4.1 MMOL/L (ref 3.5–5.1)
RBC # BLD AUTO: 4.13 X10(6)UL
SODIUM SERPL-SCNC: 140 MMOL/L (ref 136–145)
WBC # BLD AUTO: 19.2 X10(3) UL (ref 4–11)

## 2024-11-16 PROCEDURE — 99232 SBSQ HOSP IP/OBS MODERATE 35: CPT | Performed by: INTERNAL MEDICINE

## 2024-11-16 PROCEDURE — 78830 RP LOCLZJ TUM SPECT W/CT 1: CPT

## 2024-11-16 PROCEDURE — 78226 HEPATOBILIARY SYSTEM IMAGING: CPT

## 2024-11-16 RX ORDER — DOXEPIN HYDROCHLORIDE 50 MG/1
1 CAPSULE ORAL DAILY
Status: DISCONTINUED | OUTPATIENT
Start: 2024-11-17 | End: 2024-11-19

## 2024-11-16 RX ORDER — SODIUM CHLORIDE 9 MG/ML
INJECTION, SOLUTION INTRAVENOUS CONTINUOUS
Status: ACTIVE | OUTPATIENT
Start: 2024-11-16 | End: 2024-11-17

## 2024-11-16 RX ORDER — ENOXAPARIN SODIUM 100 MG/ML
40 INJECTION SUBCUTANEOUS DAILY
Status: DISCONTINUED | OUTPATIENT
Start: 2024-11-16 | End: 2024-11-19

## 2024-11-16 NOTE — ANESTHESIA POSTPROCEDURE EVALUATION
[facility]    Mar Hernandez Patient Status:  Inpatient   Age/Gender 84 year old female MRN [id number]   Location [facility] SURGERY Attending Ambrose Chatterjee,    Hosp Day # 0 PCP Dorothy Owens MD       Anesthesia Post-op Note    LAPAROSCOPIC CHOLECYSTECTOMY WITH INTRAOPERATIVE CHOLANGIOGRAM    Procedure Summary       Date: 11/15/24 Room / Location:  MAIN OR 08 / [facility] MAIN OR    Anesthesia Start: 1800 Anesthesia Stop: 2142    Procedure: LAPAROSCOPIC CHOLECYSTECTOMY WITH INTRAOPERATIVE CHOLANGIOGRAM (Abdomen) Diagnosis:       Cholecystitis      (Cholecystitis [K81.9])    Surgeons: Sumaya Garcia MD Anesthesiologist: Thais Medrano MD    Anesthesia Type: general ASA Status: 3            Anesthesia Type: general    Vitals Value Taken Time   /91 11/15/24 2154   Temp 98.1 °F (36.7 °C) 11/15/24 2138   Pulse 96 11/15/24 2208   Resp 19 11/15/24 2208   SpO2 98 % 11/15/24 2208   Vitals shown include unfiled device data.    Patient Location: PACU    Anesthesia Type: general    Airway Patency: patent    Postop Pain Control: adequate    Mental Status: mildly sedated but able to meaningfully participate in the post-anesthesia evaluation    Nausea/Vomiting: none    Cardiopulmonary/Hydration status: stable euvolemic    Complications: no apparent anesthesia related complications    Postop vital signs: stable    Dental Exam: Unchanged from Preop    Patient to be discharged from PACU when criteria met.

## 2024-11-16 NOTE — DIETARY NOTE
[facility]   part of [facility]  NUTRITION ASSESSMENT    Pt does not meet malnutrition criteria at this time.    NUTRITION INTERVENTION:    Meal and Snacks - ADAT per surgery; monitor patient po intake. Encourage adequate po of appropriate diet.  Medical Food Supplements - RD added Ensure Clear apple TID while on clear liquids; adjust to Ensure Plus HP chocolate BID once diet advanced. Rationale/use for oral supplements discussed.  Vitamin and Mineral Supplements - Recommend adding Multivitamin with minerals  Coordination of Nutrition Care - Recommend GI/Surgery consult for nutrition support/diet advancement     PATIENT STATUS: 84 year old female admitted on 11/15 presents with acute cholecystitis. Pt went to OR yesterday for laparoscopic cholecystectomy and cholangiogram. Pt screened d/t consult for \"decreased appetite and weight loss\". Visited pt at bedside with son present. Pt with dementia so hx provided by son. He reports pt has had minimal appetite lately - visits her during dinner time at NH and reports she only takes bites of food. Unsure if she has lost weight; per chart review, only 8 lb wt loss in 9 months recorded. No GI symptoms noted with last BM PTA. No chewing or swallowing difficulties and NKFA. Offered ONS and son thinks pt would prefer apple Ensure Clear while on clear liquids and chocolate Ensure once diet advanced. All questions answered at this time.    PMH: severe dementia, anxiety, hypothyroidism, hypertension, hyperlipidemia and PE    ANTHROPOMETRICS:  Ht: 170.2 cm (5' 7\")  Wt: 77.1 kg (170 lb).   BMI: Body mass index is 26.63 kg/m².  IBW: 61.4 kg    WEIGHT HISTORY:   Patient Weight(s) for the past 336 hrs:   Weight   11/15/24 1515 77.1 kg (170 lb)   11/15/24 1002 77.1 kg (170 lb)       Wt Readings from Last 10 Encounters:   11/15/24 77.1 kg (170 lb)   02/03/24 80.7 kg (178 lb)   09/28/20 80.6 kg (177 lb 9.6 oz)   06/23/20 82.7 kg (182 lb 4.8 oz)   05/14/20 83.2 kg (183 lb 8 oz)    10/30/19 80 kg (176 lb 4.8 oz)   07/18/19 79.7 kg (175 lb 12.8 oz)   03/21/19 77.6 kg (171 lb)   12/06/18 76.2 kg (168 lb)   10/18/18 75.4 kg (166 lb 4.8 oz)        NUTRITION:  Diet:       Procedures    Clear liquid diet Is Patient on Accuchecks? No      Food Allergies: No  Cultural/Ethnic/Restorationist Preferences Addressed: Yes    Percent Meals Eaten (last 3 days)       Date/Time Percent Meals Eaten (%)    11/15/24 1535 0 %            GI SYSTEM REVIEW: WNL  Skin/Wounds: sacral wound    NUTRITION RELATED PHYSICAL FINDINGS:     1. Body Fat/Muscle Mass: no wasting noted / well nourished     2. Fluid Accumulation: none per RN documentation    NUTRITION PRESCRIPTION:  77.1 kg Actual Body Weight  Calories: 9422-2316 calories/day (23-28 kcal/kg)  Protein:  grams protein/day (1.0-1.5 gm/kg)  Fluid: ~1 ml/kcal or per MD discretion    NUTRITION DIAGNOSIS/PROBLEM:  Inadequate oral intake related to insufficient appetite resulting in inadequate nutrition intake as evidenced by documented/reported insufficient oral intake    MONITOR AND EVALUATE/NUTRITION GOALS:  PO intake of 75% of meals TID - New  PO intake of 75% of oral nutrition supplement/s - New  Weight stable within 1 to 2 lbs during admission - New      MEDICATIONS:  Synthroid, abx    LABS:  Reviewed     Pt is at Moderate nutrition risk    Denice Sheth RD, LDN, CNSC  Clinical Dietitian  Spectra: [phone]

## 2024-11-16 NOTE — ANESTHESIA PROCEDURE NOTES
Airway  Date/Time: 11/15/2024 6:12 PM  Urgency: elective      General Information and Staff    Patient location during procedure: OR  Anesthesiologist: Thais Medrano MD  Performed: anesthesiologist   Performed by: Thais Medrano MD  Authorized by: Thais Medrano MD      Indications and Patient Condition  Indications for airway management: anesthesia  Sedation level: deep  Preoxygenated: yes  Patient position: sniffing  Mask difficulty assessment: 1 - vent by mask    Final Airway Details  Final airway type: endotracheal airway      Successful airway: ETT  Cuffed: yes   Successful intubation technique: Video laryngoscopy  Endotracheal tube insertion site: oral  Blade: GlideScope  Blade size: #3  ETT size (mm): 7.0    Placement verified by: capnometry   Measured from: lips  ETT to lips (cm): 21  Number of attempts at approach: 1

## 2024-11-16 NOTE — PLAN OF CARE
Alert to self, Lethargic after surgery. Tele in place, SR. Incontinent with brief in place. IVF with abx infusing. Stand and pivot to wheelchair at baseline. 5 lap sites with skin glue intact. RLQ NEMO drain with green output. Safety measures in place. All current need met. Call light inr each

## 2024-11-16 NOTE — ANESTHESIA PREPROCEDURE EVALUATION
PRE-OP EVALUATION    Patient Name: Mar Hernandez    Admit Diagnosis: Acute cholecystitis [K81.0]  Sepsis, due to unspecified organism, unspecified whether acute organ dysfunction present (HCC) [A41.9]    Pre-op Diagnosis: Cholecystitis [K81.9]    LAPAROSCOPIC CHOLECYSTECTOMY WITH INTRAOPERATIVE CHOLANGIOGRAM    Anesthesia Procedure: LAPAROSCOPIC CHOLECYSTECTOMY WITH INTRAOPERATIVE CHOLANGIOGRAM (Abdomen)    Surgeons and Role:     * Sumaya Garcia MD - Primary    Pre-op vitals reviewed.  Temp: 98.1 °F (36.7 °C)  Pulse: 97  Resp: 20  BP: 165/91  SpO2: 93 %  Body mass index is 26.63 kg/m².    Current medications reviewed.  Hospital Medications:   [COMPLETED] iopamidol 76% (ISOVUE-370) injection for power injector  100 mL Intravenous ONCE PRN    [COMPLETED] morphINE PF 4 MG/ML injection 2 mg  2 mg Intravenous Once    [] sodium chloride 0.9 % IV bolus 2,313 mL  30 mL/kg Intravenous Continuous    [COMPLETED] piperacillin-tazobactam (Zosyn) 4.5 g in dextrose 5% 100 mL IVPB-ADDV  4.5 g Intravenous Once    [Transfer Hold] atorvastatin (Lipitor) tab 10 mg  10 mg Oral Nightly    [Transfer Hold] donepezil (Aricept) tab 10 mg  10 mg Oral Daily    [Transfer Hold] ipratropium (Atrovent) 0.06 % nasal solution 1 spray  1 spray Nasal QID    [Transfer Hold] levothyroxine (Synthroid) tab 75 mcg  75 mcg Oral Before breakfast    [Transfer Hold] lisinopril (Prinivil; Zestril) tab 5 mg  5 mg Oral Daily    [Transfer Hold] hydrALAzine (Apresoline) 20 mg/mL injection 10 mg  10 mg Intravenous Q6H PRN    [Transfer Hold] labetalol (Trandate) 5 mg/mL injection 10 mg  10 mg Intravenous Q4H PRN    [Transfer Hold] memantine (Namenda) tab 10 mg  10 mg Oral BID    [Transfer Hold] QUEtiapine (SEROquel) tab 50 mg  50 mg Oral Nightly    [Transfer Hold] traZODone (Desyrel) partial tab 25 mg  25 mg Oral Nightly    sodium chloride 0.9% infusion   Intravenous Continuous    [Transfer Hold] acetaminophen (Tylenol Extra Strength) tab 500 mg  500 mg  Oral Q4H PRN    [Transfer Hold] melatonin tab 3 mg  3 mg Oral Nightly PRN    [Transfer Hold] HYDROcodone-acetaminophen (Norco) 5-325 MG per tab 1 tablet  1 tablet Oral Q4H PRN    [Transfer Hold] morphINE PF 2 MG/ML injection 0.5 mg  0.5 mg Intravenous Q2H PRN    Or    [Transfer Hold] morphINE PF 2 MG/ML injection 1 mg  1 mg Intravenous Q2H PRN    Or    [Transfer Hold] morphINE PF 2 MG/ML injection 2 mg  2 mg Intravenous Q2H PRN    [Transfer Hold] polyethylene glycol (PEG 3350) (Miralax) 17 g oral packet 17 g  17 g Oral Daily PRN    [Transfer Hold] sennosides (Senokot) tab 17.2 mg  17.2 mg Oral Nightly PRN    [Transfer Hold] bisacodyl (Dulcolax) 10 MG rectal suppository 10 mg  10 mg Rectal Daily PRN    [Transfer Hold] fleet enema (Fleet) rectal enema 133 mL  1 enema Rectal Once PRN    [Transfer Hold] ondansetron (Zofran) 4 MG/2ML injection 4 mg  4 mg Intravenous Q6H PRN    [Transfer Hold] benzonatate (Tessalon) cap 200 mg  200 mg Oral TID PRN    [Transfer Hold] guaiFENesin (Robitussin) 100 MG/5 ML oral liquid 200 mg  200 mg Oral Q4H PRN    [Transfer Hold] glycerin-hypromellose- (Artificial Tears) 0.2-0.2-1 % ophthalmic solution 1 drop  1 drop Both Eyes QID PRN    [Transfer Hold] sodium chloride (Saline Mist) 0.65 % nasal solution 1 spray  1 spray Each Nare Q3H PRN    piperacillin-tazobactam (Zosyn) 3.375 g in dextrose 5% 100 mL IVPB-ADDV  3.375 g Intravenous Q8H    [Transfer Hold] acetaminophen (Ofirmev) 10 mg/mL infusion premix 1,000 mg  1,000 mg Intravenous Q6H    [COMPLETED] prothrombin complex conc (human) (Kcentra) 2,106 Units in 80 mL infusion  2,106 Units Intravenous Once    phenylephrine (Sanford-Synephrine) 10 MG/ML injection   Intravenous PRN       Outpatient Medications:   Prescriptions Prior to Admission[1]    Allergies: Patient has no known allergies.      Anesthesia Evaluation    Patient summary reviewed.    Anesthetic Complications  (-) history of anesthetic complications          GI/Hepatic/Renal                                 Cardiovascular              Unable to assess MET.    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    Skin surgery Left 04/27/2016    ED&C of SCCIS to L Upper Arm by SD    Skin surgery Left 04/27/2016    ED&C of SCCIS to L Upper Back by SD     Social History     Socioeconomic History    Marital status:    Tobacco Use    Smoking status: Never    Smokeless tobacco: Never   Substance and Sexual Activity    Alcohol use: Not Currently     Alcohol/week: 1.0 standard drink of alcohol     Types: 1 Glasses of wine per week     Comment: 1-2 per week    Drug use: No   Other Topics Concern    Caffeine Concern Yes     Comment: 1-2 per day    Exercise No    Seat Belt Yes     History   Drug Use No     Available pre-op labs reviewed.  Lab Results   Component Value Date    WBC 23.9 (H) 11/15/2024    RBC 5.07 11/15/2024    HGB 15.4 11/15/2024    HCT 46.0 11/15/2024    MCV 90.7 11/15/2024    MCH 30.4 11/15/2024    MCHC 33.5 11/15/2024    RDW 12.5 11/15/2024    .0 11/15/2024     Lab Results   Component Value Date     11/15/2024    K 3.8 11/15/2024     11/15/2024    CO2 25.0 11/15/2024    BUN 18 11/15/2024    CREATSERUM 0.98 11/15/2024     (H) 11/15/2024    CA 11.0 (H) 11/15/2024            Airway  Comment: Mass in the hard palate was noted. Daughter in law was informed. Will use the glidescope to avoid it    Mallampati: III  Mouth opening: 3 FB  TM distance: 4 - 6 cm  Neck ROM: full Cardiovascular      Rhythm: regular        Dental  Comment: Poor dentition            Pulmonary    Pulmonary exam normal.                 Other findings              ASA: 3   Plan: general  NPO status verified and           Plan/risks discussed with: legal guardian            Patient with advanced Alzheimer. HTN. A&O x1. Daughter in low was informed about the R/B/A and all questions answered.     Present on Admission:  **None**             [1]   Medications Prior to Admission   Medication Sig Dispense Refill Last Dose/Taking    acetaminophen 500 MG Oral Tab Take 1 tablet (500 mg total) by mouth 2 (two) times daily.   11/14/2024 at  5:00 PM    acetaminophen 325 MG Oral Tab Take 2 tablets (650 mg total) by mouth every 6 (six) hours as needed for Pain or Fever.   Taking As Needed    atorvastatin 10 MG Oral Tab Take 1 tablet (10 mg total) by mouth at bedtime.   11/14/2024 at  9:00 PM    bisacodyl 5 MG Oral Tab EC Take 2 tablets (10 mg total) by mouth every 12 (twelve) hours as needed for constipation.   Taking As Needed    ELIQUIS 2.5 MG Oral Tab Take 1 tablet (2.5 mg total) by mouth 2 (two) times daily.   11/14/2024 at  5:00 PM    hydrALAZINE 10 MG Oral Tab Take 1 tablet (10 mg total) by mouth every 6 (six) hours as needed (for SBP > 160).   11/15/2024 at  8:15 AM    lisinopril 5 MG Oral Tab Take 1 tablet (5 mg total) by mouth daily.   11/15/2024 at  8:15 AM    polyethylene glycol, PEG 3350, 17 g Oral Powd Pack Take 17 g by mouth daily.   11/14/2024 at  9:00 AM    senna-docusate 8.6-50 MG Oral Tab Take 1 tablet by mouth 2 (two) times daily.   11/14/2024 at  5:00 PM    QUEtiapine 50 MG Oral Tab Take 1 tablet (50 mg total) by mouth at bedtime.   11/14/2024 at  8:00 PM    benzonatate 100 MG Oral Cap Take 1 capsule (100 mg total) by mouth every 8 (eight) hours as needed for cough.   Taking As Needed    traZODone 25 mg Oral Tab Take 1 Partial Tablet (25 mg total) by mouth at bedtime.   11/14/2024 at  8:00 PM    fexofenadine 180 MG Oral Tab Take 1 tablet (180 mg  total) by mouth Q24H PRN (for sinus headaches, post nasal drip). 30 tablet 0 Taking As Needed    Ipratropium Bromide 0.06 % Nasal Solution 1 spray by Nasal route 4 (four) times daily. 1 each 0 11/14/2024 at  9:00 PM    MEMANTINE HCL 10 MG Oral Tab TAKE 1 TAB BY MOUTH TWICE DAILY 90 tablet 3 11/14/2024 at  5:00 PM    LEVOTHYROXINE SODIUM 75 MCG Oral Tab TAKE 1 TAB BY MOUTH EVERY MORNING BEFORE BREAKFAST 90 tablet 0 11/15/2024 at  6:00 AM    CALCIUM + VITAMIN D3 600-10 MG-MCG Oral Tab TAKE 1 TAB BY MOUTH TWICE DAILY 90 tablet 3 11/14/2024 at  5:00 PM    VITAMIN B-12 250 MCG Oral Tab TAKE 1 TAB BY MOUTH DAILY 90 tablet 3 11/14/2024 at  9:00 AM    Donepezil HCl 10 MG Oral Tab Take 1 tablet (10 mg total) by mouth daily. 90 tablet 3 11/14/2024 at  9:00 AM    Multiple Vitamins-Minerals (PRESERVISION AREDS) Oral Tab Take 1 capsule by mouth 2 (two) times daily. 180 tablet 3 11/14/2024 at  5:00 PM

## 2024-11-16 NOTE — PROGRESS NOTES
[facility]   part of [facility]     Hospitalist Progress Note     Mar Hernandez Patient Status:  Inpatient    1940 MRN [id number]   Location [facility] 3NW-A Attending Ambrose Chatterjee,    Hosp Day # 1 PCP Dorothy Owens MD     Chief Complaint: Elevated BP, abdominal pain    Subjective:     Patient reports a headache and feeling tired, no other acute complaints.     Objective:    Review of Systems:   A comprehensive review of systems was completed; pertinent positive and negatives stated in subjective.    Vital signs:  Temp:  [97.4 °F (36.3 °C)-98.7 °F (37.1 °C)] 97.4 °F (36.3 °C)  Pulse:  [] 75  Resp:  [14-26] 19  BP: (122-177)/() 122/67  SpO2:  [92 %-100 %] 100 %    Physical Exam:    General: No acute distress, alert but with baseline dementia  Respiratory: No rhonchi, no wheezes  Cardiovascular: S1, S2. Regular rate and rhythm  Abdomen: Soft, post op tenderness, non-distended, positive bowel sounds. Abdominal binder in place. RLQ NEMO drain with green output  Extremities: No edema    Diagnostic Data:    Labs:  Recent Labs   Lab 11/15/24  1010 24  0558   WBC 23.9* 19.2*   HGB 15.4 12.8   MCV 90.7 92.3   .0 154.0     Recent Labs   Lab 11/15/24  1010 24  0558   * 131*   BUN 18 19   CREATSERUM 0.98 1.10*   CA 11.0* 9.1   ALB 4.4  --     140   K 3.8 4.1    110   CO2 25.0 24.0   ALKPHO 95  --    AST 23  --    ALT 28  --    BILT 0.9  --    TP 8.1  --      Estimated Creatinine Clearance: 38.3 mL/min (A) (based on SCr of 1.1 mg/dL (H)).    Recent Labs   Lab 11/15/24  1010   TROPHS 7     No results for input(s): \"PTP\", \"INR\" in the last 168 hours.     Microbiology  No results found for this visit on 11/15/24.    Imaging: Reviewed in Epic.    Medications:    atorvastatin  10 mg Oral Nightly    donepezil  10 mg Oral Daily    ipratropium  1 spray Nasal QID    levothyroxine  75 mcg Oral Before breakfast    lisinopril  5 mg Oral Daily    memantine  10 mg Oral  BID    QUEtiapine  50 mg Oral Nightly    traZODone  25 mg Oral Nightly    piperacillin-tazobactam  3.375 g Intravenous Q8H    acetaminophen  1,000 mg Intravenous Q6H    ketorolac  15 mg Intravenous Q6H    traMADol  50 mg Oral 4 times per day       Assessment & Plan:      #Acute gangrenous cholecystitis  -S/p laparoscopic cholecystectomy by general surgery on 11/15  -Pain control, PRN-antiemetics  -CLD  -Continue zosyn, follow blood and intra-op cultures  -General surgery consulted    #Sepsis due to above  #Lactic acidosis  -S/p sepsis bolus in ER  -Lactic acid trended down, can stop trending  -Follow cultures  -Zosyn     #Leukocytosis due to above  -Monitor, better today     #Severe dementia with behavioral disturbances  #Anxiety  -Patient was in memory care since 2020 but transferred to skilled nursing after hip fracture surgery in January 2024  -Resume home donezepil, memantine, trazodone and Seroquel     #Hypothyroidism  -Levothyroxine     #Hypertension  -Continue home lisinopril  -PRN hydralazine and labetalol ordered     #Hyperlipidemia  -Statin     #PE in Feb 2024, likely provoked after hip surgery  -Patient has completed >6 month anticoagulation after hip surgery. Will stop Eliquis going forward. Son also agrees. We discussed risk/benefits and he wishes to stop  -Lovenox for DVT ppx      Ambrose Chatterjee,     Supplementary Documentation:     Quality:  DVT Mechanical Prophylaxis:   SCDs, Early ambuation  DVT Pharmacologic Prophylaxis   Medication   None     Code Status: DNAR/Selective Treatment  Ott: No urinary catheter in place  DELICIA: 1-2 days    Discharge is dependent on: Clinical state, cultures, general surgery recs  At this point Ms. Hernandez is [facility] be [facility]    The 21st Century Cures Act makes medical notes like these available to patients in the interest of transparency. Please be advised this is a medical document. Medical documents are intended to carry relevant information, facts as  evident, and the clinical opinion of the practitioner. The medical note is intended as peer to peer communication and may appear blunt or direct. It is written in medical language and may contain abbreviations or verbiage that are unfamiliar.              **Certification    PHYSICIAN Certification of Need for Inpatient Hospitalization - Initial Certification    Patient will require inpatient services that will reasonably be expected to span two midnight's based on the clinical documentation in H+P.   Based on patients current state of illness, I anticipate that, after discharge, patient will require TBD.

## 2024-11-16 NOTE — PROGRESS NOTES
Alert and oriented to self. VSS. DONOVAN GIVENS. Son at bedside.   Diet: NPO for hida scan to evaluate for bile leak.   IVF SL with IV ABX per MAR. IV tylenol for pain.   DVT prophylaxis with Lovenox.   Stop eliquis per hospitalist indefinitely.   5 lap sites with skin glue. NEMO RLQ yellow/brownish color.  ABD binder in place.   Briefed incontinent. Purewick in place.   Wheelchair stand and pivot. PT/OT to see.   Safety precautions in place.   Call light in reach.

## 2024-11-16 NOTE — OPERATIVE REPORT
OPERATIVE NOTE    Mar Hernandez Location: OR   [facility] [id number] MRN [id number]   Admission Date 11/15/2024 Operation Date 11/15/2024   Attending Physician Ambrose Chatterjee DO Operating Physician Sumaya Garcia MD     PREOPERATIVE DIAGNOSIS  Acute cholecystitis  Late stage Alzheimer's disease  DVT on Eliquis    POSTOPERATIVE DIAGNOSIS  Acute gangrenous cholecystitis  Late stage Alzheimer's disease  DVT on Eliquis    PROCEDURE PERFORMED  Laparoscopic cholecystectomy  Transversus abdominis plane block  Intra-operative cholangiogram  Placement of Surgicel  Placement of fibular  Placement of drain    SURGEON  Sumaya Garcia MD    ASSISTANTS  Brandon Roman MD his assistance was necessary for the case especially in the careful dissection at the hilum  BRITTA Freeman his assistance was necessary for the conduct of this case especially in the intra-abdominal dissection and cholangiogram  ricardo Ellis assist his assistance was necessary for the conduct of this case especially in the abdominal closure    ANESTHESIA  General    INDICATIONS   84 year old female presented to the hospital with abdominal pain.  Patient has late stage Alzheimer's disease so duration of abdominal pain was unclear.  In the emergency room, patient was found to be septic with leukocytosis of 23.  CT imaging was performed and showed a severely distended gallbladder with pericholecystic fat stranding consistent with acute cholecystitis.  On physical exam, patient was exquisitely tender to palpation in the right upper quadrant.  Patient also had a history of DVT on Eliquis, last dose was yesterday evening.  Due to patient's history of dementia and sepsis due to an acutely inflamed and infected gallbladder, cholecystectomy was indicated.  Kcentra was administered to reverse the effects of Eliquis.  The procedure and the risks were discussed with the patient's daughter who acknowledged understanding and  consented.    FINDINGS  Intra-abdominal adhesions from the inflamed gallbladder with some fibrinous exudate, very distended gallbladder with gangrenous changes, gallbladder took up most of the right upper quadrant, limiting visualization until decompression was completed, successful cholangiogram with identification of the cystic duct and common bile duct with good flow of contrast into the duodenum    FINDINGS/DESCRIPTION OF PROCEDURE  After informed consent, patient was brought to the operating room and placed in supine position with arms out. Bilateral SCDs were placed and pre-operative antibiotics administered. Anesthesia was induced without any complication. Patient was prepped and draped in the usual sterile fashion. Time out was performed confirming patient, procedure, and site.    The Veress needle was used to gain pneumoperitoneum. Using blade, a curvilinear supraumbilical incision was made. Veress needle was inserted just under the umbilical stalk with audible clicks. On aspiration, there was no bowel contents.  Saline flowed easily confirming presence in the abdomen. Pneumoperitoneum was created with CO2. An 11mm port was inserted. Upon entrance, no injury was noted to omentum or bowel at site of entrance.  There were obvious adhesions to the omentum along the midline and in the right upper quadrant, limiting visualization.  On the left part of the abdomen, there were no adhesions.  I decided to place two 5 mm port along the left upper and left lower quadrant to help aid in taking down the adhesions.  Using blade, incisions were made and under direct visualization 5 mm ports were placed.  Once I was able to see the right side of the abdomen, I noted adhesions from the omentum to the abdominal wall as well as the gallbladder.  Adhesions are most likely due to acutely inflamed gallbladder which was large and took up the entire right upper quadrant.  Using LigaSure, I carefully took down adhesions to the  falciform ligament as well as the anterior abdominal wall on the right side to aid in port placement.  Once the right lateral aspect of the abdomen was cleared, we proceeded to place two 5 mm ports on the right at lateral aspect of the abdomen triangulating toward the location of the gallbladder.  The gallbladder was needle decompressed.  A sample of the bowel was sent for culture.  Once the gallbladder was decompressed, visualization of the right upper quadrant was improved.  I was finally able to place my last port in the subxiphoid position under direct visualization.  A transversus abdominis plane block was performed and then the case proceeded.    Gallbladder was retracted above the liver.  Using blunt dissection with the suction , the infundibulum was identified.  Patient had thickened peritoneum and fat along the gallbladder.  Using LigaSure, the sac was divided revealing the edge of the gallbladder.  Using careful dissection with Maryland's, Kitners, and suction, the hilum and cystic structures were identified.  Due to the inflamed nature of the gallbladder, dissection did take longer than normal.  Cystic duct and artery were identified, clearly seeing the critical view of safety.  In dissection, the cystic duct did have a duct anatomy with some spillage of bile but this was right at the edge of the infundibulum and cystic duct.  Using this as our opening, we were able to place the cholangiogram catheter for the cholangiogram.  The infundibulum was occluded just proximal.  Cholangiogram was done and showed normal CBD and cystic duct with good flow of contrast into the duodenum. Catheter was removed and the cystic duct ligated with an Endoloop x 2.  The second Endoloop did clearly go around the lumen of the cystic duct.  The cystic artery was then clipped and divided. The gallbladder was then resected from the fossa and placed in endocatch bag. The gallbladder fossa was irrigated until clear.  Hemostasis was achieved with Surgicel placement and fibular placement.  A 19 [place] Rhett drain was placed in the right upper quadrant and anchored to the abdominal wall with a 2-0 nylon.  Gallbladder was extracted from abdominal cavity and sent for pathology.  Pneumoperitoneum was evacuated.  Fascia at the umbilicus was closed with 0 Vicryl.  Skin incisions were closed with 4-o monocryl. Incisions were washed and dressed with dermabond.    At the end of the case, all counts were correct. Patient tolerated procedure well. Patient was awakened and transferred to PACU in a hemodynamically stable condition.     SPECIMENS REMOVED  Gallbladder    ESTIMATED BLOOD LOSS  50 ml    COMPLICATIONS  None    Sumaya Garcia MD

## 2024-11-16 NOTE — PROGRESS NOTES
[facility]  Progress Note    Mar Hernandez Patient Status:  Inpatient    1940 MRN [id number]   Location [facility] 3NW-A Attending Ambrose Chatterjee,    Hosp Day # 1 PCP Dorothy Owens MD     Subjective:  He is seen and examined resting in bed with her son at bedside who aids in history. She reports no nausea or vomiting. She denies abdominal pain. ERIK drain with 170 mL of output     WBC 19.2. Lactic acid elevated 2.3.   Objective/Physical Exam:  /67 (BP Location: Left arm)   Pulse 75   Temp 97.4 °F (36.3 °C) (Oral)   Resp 19   Ht 67\"   Wt 170 lb (77.1 kg)   SpO2 100%   BMI 26.63 kg/m²     Intake/Output Summary (Last 24 hours) at 2024 1413  Last data filed at 2024 1300  Gross per 24 hour   Intake 3548 ml   Output 310 ml   Net 3238 ml         General:awake, poor historian  Pulm: no respiratory distress, no increased work of breathing  Abdomen:  Soft, non-distended,appropraite incisional tenderness, with no rebound or guarding.  No peritoneal signs.   Incision:  Clean, dry, intact without erythema.    Drains: Erik drain with bilious output    Labs:  Lab Results   Component Value Date    WBC 19.2 2024    HGB 12.8 2024    HCT 38.1 2024    .0 2024      Lab Results   Component Value Date    INR 1.00 2024     Lab Results   Component Value Date     2024    K 4.1 2024     2024    CO2 24.0 2024    BUN 19 2024    CREATSERUM 1.10 2024     2024    CA 9.1 2024            Assessment:  Patient Active Problem List   Diagnosis    Primary hypertension    Acquired hypothyroidism    Intermediate stage nonexudative age-related macular degeneration of both eyes    Severe dementia with agitation (HCC)    Physical deconditioning    Closed fracture of left hip, initial encounter (HCC)    Nausea and vomiting    Atelectasis    Hypoxia    Acute hypoxemic respiratory failure (HCC)    Pulmonary embolus  (HCC)    Acute cholecystitis    Sepsis, due to unspecified organism, unspecified whether acute organ dysfunction present (HCC)       POD 1 laparoscopic cholecystectomy       Plan:  NPO  STAT HIDA to evaluate for bile leak; further recommendations pending results  Analgesics and antiemetics as needed  Encourage ambulation and up to chair  Continue IV antibiotics- Zosyn  DVT prophylaxis with lovenox     Mary Brenner PA-C  11/16/2024  2:13 PM    This note was initiated by Mary Brenner.  The PA saw the patient in conjunction with me. The PA performed a history, exam, and developed the assessment and plan in conjunction with me. I agree with the above note and have made changes which reflect my own history and physical, if necessary.    On physical exam, NEMO drain with bilious output concerning for bile leak  Given extensive gangrenous acute cholecystitis and difficult dissection, bile leak not unexpected, most likely from the edematous cystic duct  Will obtain HIDA scan  Will most likely need GI consult for ERCP  Continue clear liquids as tolerated  Surgery will continue to follow  Continue care per primary    Sumaya Garcia MD  [facility]  11/16/2024  6:42 PM

## 2024-11-17 LAB
ALBUMIN SERPL-MCNC: 3.3 G/DL (ref 3.2–4.8)
ALBUMIN/GLOB SERPL: 1.4 {RATIO} (ref 1–2)
ALP LIVER SERPL-CCNC: 73 U/L
ALT SERPL-CCNC: 13 U/L
ANION GAP SERPL CALC-SCNC: 4 MMOL/L (ref 0–18)
AST SERPL-CCNC: 14 U/L (ref ?–34)
BASOPHILS # BLD AUTO: 0.03 X10(3) UL (ref 0–0.2)
BASOPHILS NFR BLD AUTO: 0.2 %
BILIRUB SERPL-MCNC: 0.5 MG/DL (ref 0.2–1.1)
BUN BLD-MCNC: 22 MG/DL (ref 9–23)
CALCIUM BLD-MCNC: 9.1 MG/DL (ref 8.7–10.4)
CHLORIDE SERPL-SCNC: 109 MMOL/L (ref 98–112)
CO2 SERPL-SCNC: 25 MMOL/L (ref 21–32)
CREAT BLD-MCNC: 0.9 MG/DL
EGFRCR SERPLBLD CKD-EPI 2021: 64 ML/MIN/1.73M2 (ref 60–?)
EOSINOPHIL # BLD AUTO: 0.04 X10(3) UL (ref 0–0.7)
EOSINOPHIL NFR BLD AUTO: 0.3 %
ERYTHROCYTE [DISTWIDTH] IN BLOOD BY AUTOMATED COUNT: 13 %
GLOBULIN PLAS-MCNC: 2.4 G/DL (ref 2–3.5)
GLUCOSE BLD-MCNC: 113 MG/DL (ref 70–99)
HCT VFR BLD AUTO: 37.5 %
HGB BLD-MCNC: 12.1 G/DL
IMM GRANULOCYTES # BLD AUTO: 0.07 X10(3) UL (ref 0–1)
IMM GRANULOCYTES NFR BLD: 0.5 %
LYMPHOCYTES # BLD AUTO: 1.49 X10(3) UL (ref 1–4)
LYMPHOCYTES NFR BLD AUTO: 11.2 %
MAGNESIUM SERPL-MCNC: 1.9 MG/DL (ref 1.6–2.6)
MCH RBC QN AUTO: 30.5 PG (ref 26–34)
MCHC RBC AUTO-ENTMCNC: 32.3 G/DL (ref 31–37)
MCV RBC AUTO: 94.5 FL
MONOCYTES # BLD AUTO: 1 X10(3) UL (ref 0.1–1)
MONOCYTES NFR BLD AUTO: 7.5 %
NEUTROPHILS # BLD AUTO: 10.66 X10 (3) UL (ref 1.5–7.7)
NEUTROPHILS # BLD AUTO: 10.66 X10(3) UL (ref 1.5–7.7)
NEUTROPHILS NFR BLD AUTO: 80.3 %
OSMOLALITY SERPL CALC.SUM OF ELEC: 290 MOSM/KG (ref 275–295)
PLATELET # BLD AUTO: 175 10(3)UL (ref 150–450)
POTASSIUM SERPL-SCNC: 3.6 MMOL/L (ref 3.5–5.1)
PROT SERPL-MCNC: 5.7 G/DL (ref 5.7–8.2)
RBC # BLD AUTO: 3.97 X10(6)UL
SODIUM SERPL-SCNC: 138 MMOL/L (ref 136–145)
WBC # BLD AUTO: 13.3 X10(3) UL (ref 4–11)

## 2024-11-17 PROCEDURE — 99232 SBSQ HOSP IP/OBS MODERATE 35: CPT | Performed by: INTERNAL MEDICINE

## 2024-11-17 RX ORDER — SODIUM CHLORIDE 9 MG/ML
INJECTION, SOLUTION INTRAVENOUS CONTINUOUS
Status: ACTIVE | OUTPATIENT
Start: 2024-11-17 | End: 2024-11-18

## 2024-11-17 NOTE — PROGRESS NOTES
[facility]  Progress Note    Mar Hernandez Patient Status:  Inpatient    1940 MRN [id number]   Location [facility] 3NW-A Attending Ambrose Chatterjee,    Hosp Day # 2 PCP Dorothy Owens MD     Subjective:  She is seen and examined resting in bed with son at bedside who aids in history. HIDA + for bile leak yesterday. She reports pain with palpation.    Leukocytosis improving  19.2 -->13.3  Objective/Physical Exam:  BP (!) 161/84 (BP Location: Right arm)   Pulse 96   Temp 98 °F (36.7 °C) (Oral)   Resp 18   Ht 67\"   Wt 170 lb (77.1 kg)   SpO2 90%   BMI 26.63 kg/m²     Intake/Output Summary (Last 24 hours) at 2024 1244  Last data filed at 2024 1216  Gross per 24 hour   Intake 900 ml   Output 660 ml   Net 240 ml         General: Awake, Alert,  Cooperative.  No apparent distress.  Pulm: no respiratory distress, no increased work of breathing  Abdomen:  Soft, non-distended, appropriate incisional tenderness, with no rebound or guarding.  No peritoneal signs.  Incision:  Clean, dry, intact without erythema. Dermabond in place   Drains: NEMO drain with bilious drainage    Labs:  Lab Results   Component Value Date    WBC 13.3 2024    HGB 12.1 2024    HCT 37.5 2024    .0 2024      Lab Results   Component Value Date    INR 1.00 2024     Lab Results   Component Value Date     2024    K 3.6 2024     2024    CO2 25.0 2024    BUN 22 2024    CREATSERUM 0.90 2024     2024    CA 9.1 2024    ALKPHO 73 2024    ALT 13 2024    AST 14 2024    BILT 0.5 2024    ALB 3.3 2024    TP 5.7 2024            Assessment:  Patient Active Problem List   Diagnosis    Primary hypertension    Acquired hypothyroidism    Intermediate stage nonexudative age-related macular degeneration of both eyes    Severe dementia with agitation (HCC)    Physical deconditioning    Closed fracture of  left hip, initial encounter (HCC)    Nausea and vomiting    Atelectasis    Hypoxia    Acute hypoxemic respiratory failure (HCC)    Pulmonary embolus (HCC)    Acute cholecystitis    Sepsis, due to unspecified organism, unspecified whether acute organ dysfunction present (HCC)       POD 2 laparoscopic cholecystectomy  Bile leak     Plan:  Okay for clear liquid diet from a general surgery standpoint if okay with GI  Consult GI for + bile leak; will need ERCP  Analgesics and antiemetics as needed  Continue IV antibiotics- Zosyn  Continue drain care  DVT prophylaxis with lovenox     Mary Brenner PA-C  11/17/2024  12:44 PM    This note was initiated by Mary Brenner.  The PA saw the patient in conjunction with me. The PA performed a history, exam, and developed the assessment and plan in conjunction with me. I agree with the above note and have made changes which reflect my own history and physical, if necessary.    Hide a positive  NEMO still with bilious output  GI consult for ERCP and stent  Continue clears, advance as tolerated to soft once ERCP has been performed  Continue pain control as needed  Ambulate and mobilize as tolerated  Continue care per primary    Sumaya Garcia MD  [facility]  11/17/2024  2:37 PM

## 2024-11-17 NOTE — CONSULTS
[facility]                       Gastroenterology Consultation-[facility]    Mar Hernandez Patient Status:  Inpatient    1940 MRN [id number]   Location [facility] 3NW-A Attending Ambrose Chatterjee,    Hosp Day # 2 PCP Dorothy Owens MD         Reason for consultation: bile leak  HPI: Ms. Hernandez is an 85 yo F with h/o PE on Eliquis, Alzheimer's, who presented to ER with abdominal pain and was found to have acute cholecystitis s/p cholecystectomy 11/15 c/b bile leak. She had increased output from NEMO drain and thus HIDA was ordered which confirmed bile leak. She has been afebrile. She denies significant abdominal pain but does have some tenderness on exam.   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    Skin surgery Left 2016    ED&C of SCCIS to L Upper Arm by SD    Skin surgery Left 2016    ED&C of SCCIS to L Upper Back by SD     Medications:    enoxaparin (Lovenox) 40 MG/0.4ML SUBQ injection 40 mg  40 mg Subcutaneous Daily    multivitamin (Tab-A-Carole/Beta Carotene) tab 1 tablet  1 tablet Oral Daily    [] sodium chloride 0.9% infusion   Intravenous Continuous    [COMPLETED] iopamidol 76% (ISOVUE-370) injection for power injector  100 mL Intravenous ONCE PRN    [COMPLETED] morphINE PF 4 MG/ML injection 2 mg  2 mg Intravenous Once    [] sodium chloride 0.9 % IV bolus 2,313 mL  30 mL/kg Intravenous Continuous    [COMPLETED] piperacillin-tazobactam (Zosyn) 4.5 g in dextrose 5% 100 mL IVPB-ADDV  4.5 g Intravenous Once    atorvastatin (Lipitor) tab 10 mg  10 mg Oral Nightly    donepezil (Aricept) tab 10 mg  10 mg Oral Daily    ipratropium  (Atrovent) 0.06 % nasal solution 1 spray  1 spray Nasal QID    levothyroxine (Synthroid) tab 75 mcg  75 mcg Oral Before breakfast    lisinopril (Prinivil; Zestril) tab 5 mg  5 mg Oral Daily    hydrALAzine (Apresoline) 20 mg/mL injection 10 mg  10 mg Intravenous Q6H PRN    labetalol (Trandate) 5 mg/mL injection 10 mg  10 mg Intravenous Q4H PRN    memantine (Namenda) tab 10 mg  10 mg Oral BID    QUEtiapine (SEROquel) tab 50 mg  50 mg Oral Nightly    traZODone (Desyrel) partial tab 25 mg  25 mg Oral Nightly    acetaminophen (Tylenol Extra Strength) tab 500 mg  500 mg Oral Q4H PRN    melatonin tab 3 mg  3 mg Oral Nightly PRN    morphINE PF 2 MG/ML injection 0.5 mg  0.5 mg Intravenous Q2H PRN    Or    morphINE PF 2 MG/ML injection 1 mg  1 mg Intravenous Q2H PRN    Or    morphINE PF 2 MG/ML injection 2 mg  2 mg Intravenous Q2H PRN    polyethylene glycol (PEG 3350) (Miralax) 17 g oral packet 17 g  17 g Oral Daily PRN    sennosides (Senokot) tab 17.2 mg  17.2 mg Oral Nightly PRN    bisacodyl (Dulcolax) 10 MG rectal suppository 10 mg  10 mg Rectal Daily PRN    fleet enema (Fleet) rectal enema 133 mL  1 enema Rectal Once PRN    ondansetron (Zofran) 4 MG/2ML injection 4 mg  4 mg Intravenous Q6H PRN    benzonatate (Tessalon) cap 200 mg  200 mg Oral TID PRN    guaiFENesin (Robitussin) 100 MG/5 ML oral liquid 200 mg  200 mg Oral Q4H PRN    glycerin-hypromellose- (Artificial Tears) 0.2-0.2-1 % ophthalmic solution 1 drop  1 drop Both Eyes QID PRN    sodium chloride (Saline Mist) 0.65 % nasal solution 1 spray  1 spray Each Nare Q3H PRN    piperacillin-tazobactam (Zosyn) 3.375 g in dextrose 5% 100 mL IVPB-ADDV  3.375 g Intravenous Q8H    acetaminophen (Ofirmev) 10 mg/mL infusion premix 1,000 mg  1,000 mg Intravenous Q6H    [COMPLETED] prothrombin complex conc (human) (Kcentra) 2,106 Units in 80 mL infusion  2,106 Units Intravenous Once    ketorolac (Toradol) 15 MG/ML injection 15 mg  15 mg Intravenous Q6H    traMADol (Ultram)  tab 50 mg  50 mg Oral 4 times per day    [COMPLETED] labetalol (Trandate) 5 mg/mL injection 5 mg  5 mg Intravenous Once     Allergies: Allergies[1]  SocHx:    Social History     Socioeconomic History    Marital status:    Tobacco Use    Smoking status: Never    Smokeless tobacco: Never   Substance and Sexual Activity    Alcohol use: Not Currently     Alcohol/week: 1.0 standard drink of alcohol     Types: 1 Glasses of wine per week     Comment: 1-2 per week    Drug use: No   Other Topics Concern    Caffeine Concern Yes     Comment: 1-2 per day    Exercise No    Seat Belt Yes     Social Drivers of Health     Food Insecurity: No Food Insecurity (11/15/2024)    Food Insecurity     Food Insecurity: Never true   Transportation Needs: No Transportation Needs (11/15/2024)    Transportation Needs     Lack of Transportation: No   Housing Stability: Low Risk  (11/15/2024)    Housing Stability     Housing Instability: No      FamHx:   Family History   Problem Relation Age of Onset    Other (Other) Father         Aortic Aneurysm    Hypertension Brother       ROS:  In addition to the pertinent positives described above:            Infectious Disease: See hpi            Cardiovascular: No history of CAD, prior MI, chest pain, or palpitations            Respiratory: h/o PE            Hematologic: The patient reports no easy bruising, frequent gum bleeding or nose bleeding;  The patient has no history of known chronic anemia            Dermatologic: The patient reports no recent rashes or chronic skin disorders            Rheumatologic: +arthralgias, recent hip arthroplasty            Genitourinary:  The patient reports no history of recurrent urinary tract infections, hematuria, dysuria, or nephrolithiasis           Psychiatric: The patient reports no history of depression, anxiety, suicidal ideation, or homicidal ideation           Oncologic: The patient reports on history of prior solid tumor or hematologic malignancy            ENT: The patient reports no hoarseness of voice, hearing loss, sinus congestion, tinnitus           Neurologic: The patient reports no history of seizure, stroke, or frequent headaches    PE: /65 (BP Location: Right arm)   Pulse 88   Temp 98.1 °F (36.7 °C) (Oral)   Resp 14   Ht 5' 7\" (1.702 m)   Wt 170 lb (77.1 kg)   SpO2 91%   BMI 26.63 kg/m²   Gen: Able to speak in complete sentences  HENT: NCAT, EOMI, oropharynx is clear with moist mucosal membranes  Eyes: Sclerae are anicteric  Neck:  Supple without nuchal rigidity; No lymphadenopathy  CV: Regular rate and rhythm, with normal S1 and S2; No S3; No murmurs  Resp: Clear to auscultation bilaterally without wheezes; rubs, rhonchi, or rales  Abdomen: Soft, mild RUQ tenderness, non-distended with the presence of bowel sounds; No hepatosplenomegaly; no rebound or guarding; No ascites is clinically apparent; no tympany to percussion; NEMO drain with bilious fluid  Ext: No peripheral edema or cyanosis  Skin: Warm and dry  Psychiatric: Appropriate mood and congruent affect without obvious depression or anxiety  Labs:   Lab Results   Component Value Date    WBC 13.3 11/17/2024    HGB 12.1 11/17/2024    HCT 37.5 11/17/2024    .0 11/17/2024    CREATSERUM 0.90 11/17/2024    BUN 22 11/17/2024     11/17/2024    K 3.6 11/17/2024     11/17/2024    CO2 25.0 11/17/2024     11/17/2024    CA 9.1 11/17/2024    ALB 3.3 11/17/2024    ALKPHO 73 11/17/2024    BILT 0.5 11/17/2024    AST 14 11/17/2024    ALT 13 11/17/2024     Recent Labs   Lab 11/15/24  1010 11/16/24  0558 11/17/24  0546   * 131* 113*   BUN 18 19 22   CREATSERUM 0.98 1.10* 0.90   CA 11.0* 9.1 9.1    140 138   K 3.8 4.1 3.6    110 109   CO2 25.0 24.0 25.0     Recent Labs   Lab 11/17/24  0546   RBC 3.97   HGB 12.1   HCT 37.5   MCV 94.5   MCH 30.5   MCHC 32.3   RDW 13.0   NEPRELIM 10.66*   WBC 13.3*   .0       Recent Labs   Lab 11/15/24  1010 11/17/24  0546    ALT 28 13   AST 23 14       Imaging:   HIDA:  Impression   CONCLUSION:  There is a bile leak with the radiotracer collecting within the gallbladder fossa and then exiting through the drainage catheter.     CT A/P 11/15:  Impression   CONCLUSION:    1. Findings concerning for acute cholecystitis  2. Small right pleural effusion with bibasilar atelectatic change     Impression:   Bile leak s/p cholecystectomy 11/15: pt does have NEMO drain in place currently  H/o PE: on Eliquis (last was 11/15) but this was also reversed prior to surgery    Recommendations:   Recommend ERCP; tentatively plan for tomorrow  NPO  Empiric Abx  CBC, CMP in am    Spoke with patient's son Kana, who is POA. The potentially life-threatening complications of infection, sedation, bleeding, pancreatitis, and perforation were reviewed along with the possible need for surgical management, transfusions, or repeat endoscopy should one of these complications arise. He understands and is agreeable.           Thank you for the consultation, we will follow the patient with you.    Jaelyn Evans DO  11:17 AM  11/17/2024  [facility]  [phone]             [1] No Known Allergies

## 2024-11-17 NOTE — PLAN OF CARE
Received pt at 1930. Pt is A&O x 2, self and place; follows commands, pleasantly confused; daughter at bedside. Pt is on RA, VSS, NPO diet waiting on results of HS HIDA scan completed this evening. Pt is incontinent of bowel and bladder; currently with purewick in place. Pt denies pain and ambulates with total assist. IV access is patent infusing 0.9 NS @ 75 ml/hr. NOC safety plan in place; bed in low position, call light and personal items within reach, pt encouraged to call staff for assistance.

## 2024-11-17 NOTE — PROGRESS NOTES
[facility]   part of [facility]     Hospitalist Progress Note     Mar Hernandez Patient Status:  Inpatient    1940 MRN [id number]   Location [facility] 3NW-A Attending Ambrose Chatterjee,    Hosp Day # 2 PCP Dorothy Owens MD     Chief Complaint: Elevated BP, abdominal pain    Subjective:     Son at bedside who reports patient hasn't complained of pain. No fever. Denies complaints.     Objective:    Review of Systems:   A comprehensive review of systems was completed; pertinent positive and negatives stated in subjective.    Vital signs:  Temp:  [97.4 °F (36.3 °C)-98.7 °F (37.1 °C)] 98.1 °F (36.7 °C)  Pulse:  [88-95] 88  Resp:  [14-18] 14  BP: (122-159)/(65-81) 140/65  SpO2:  [88 %-91 %] 91 %    Physical Exam:    General: No acute distress, alert but with baseline dementia  Respiratory: No rhonchi, no wheezes  Cardiovascular: S1, S2. Regular rate and rhythm  Abdomen: Soft, non-distended, mild RUQ tenderness. Abdominal binder in place. RLQ NEMO drain with brownish fluid  Extremities: No edema    Diagnostic Data:    Labs:  Recent Labs   Lab 11/15/24  1010 24  0558 24  0546   WBC 23.9* 19.2* 13.3*   HGB 15.4 12.8 12.1   MCV 90.7 92.3 94.5   .0 154.0 175.0     Recent Labs   Lab 11/15/24  1010 24  0558 24  0546   * 131* 113*   BUN 18 19 22   CREATSERUM 0.98 1.10* 0.90   CA 11.0* 9.1 9.1   ALB 4.4  --  3.3    140 138   K 3.8 4.1 3.6    110 109   CO2 25.0 24.0 25.0   ALKPHO 95  --  73   AST 23  --  14   ALT 28  --  13   BILT 0.9  --  0.5   TP 8.1  --  5.7     Estimated Creatinine Clearance: 46.9 mL/min (based on SCr of 0.9 mg/dL).    Recent Labs   Lab 11/15/24  1010   TROPHS 7     No results for input(s): \"PTP\", \"INR\" in the last 168 hours.     Microbiology  Hospital Encounter on 11/15/24   1. Aerobic Bacterial Culture     Status: None (Preliminary result)    Collection Time: 11/15/24  7:05 PM    Specimen: Gallbladder; Body fluid, unspecified   Result  Value Ref Range    Aerobic Culture Result No Growth at 18-24 hrs. N/A    Aerobic Smear No WBCs seen N/A    Aerobic Smear No organisms seen N/A   2. Blood Culture     Status: None (Preliminary result)    Collection Time: 11/15/24 12:19 PM    Specimen: Blood,peripheral   Result Value Ref Range    Blood Culture Result No Growth 1 Day N/A     Imaging: Reviewed in Epic.    Medications:    enoxaparin  40 mg Subcutaneous Daily    multivitamin  1 tablet Oral Daily    atorvastatin  10 mg Oral Nightly    donepezil  10 mg Oral Daily    ipratropium  1 spray Nasal QID    levothyroxine  75 mcg Oral Before breakfast    lisinopril  5 mg Oral Daily    memantine  10 mg Oral BID    QUEtiapine  50 mg Oral Nightly    traZODone  25 mg Oral Nightly    piperacillin-tazobactam  3.375 g Intravenous Q8H    acetaminophen  1,000 mg Intravenous Q6H    ketorolac  15 mg Intravenous Q6H    traMADol  50 mg Oral 4 times per day       Assessment & Plan:      #Acute gangrenous cholecystitis complicated by bile leak  -S/p laparoscopic cholecystectomy by general surgery on 11/15  -NPO, IVF, pain control, PRN-antiemetics  -HIDA scan with bile leak, GI consulted   -Continue zosyn, follow blood and intra-op cultures  -General surgery consulted    #Sepsis due to above  #Lactic acidosis  -S/p sepsis bolus in ER  -Lactic acid trended down  -Follow cultures  -Zosyn     #Leukocytosis due to above  -Monitor, improving     #Severe dementia with behavioral disturbances  #Anxiety  -Patient was in memory care since 2020 but transferred to skilled nursing after hip fracture surgery in January 2024  -Resume home donezepil, memantine, trazodone and Seroquel     #Hypothyroidism  -Levothyroxine     #Hypertension  -Continue home lisinopril  -PRN hydralazine and labetalol ordered     #Hyperlipidemia  -Statin     #PE in Feb 2024, likely provoked after hip surgery  -Patient has completed >6 month anticoagulation after hip surgery. Stop Eliquis going forward. Son also agrees.  We discussed risk/benefits and he wishes to stop  -Lovenox for DVT ppx      Ambrose Chatterjee,     Supplementary Documentation:     Quality:  DVT Mechanical Prophylaxis:   SCDs, Early ambuation  DVT Pharmacologic Prophylaxis   Medication    enoxaparin (Lovenox) 40 MG/0.4ML SUBQ injection 40 mg     Code Status: DNAR/Selective Treatment  Ott: External urinary catheter in place  DELICIA: 1-2 days    Discharge is dependent on: Clinical state, cultures, consultant recs  At this point Ms. Hernandez is expected to be discharge to: [facility]    The 21st Century Cures Act makes medical notes like these available to patients in the interest of transparency. Please be advised this is a medical document. Medical documents are intended to carry relevant information, facts as evident, and the clinical opinion of the practitioner. The medical note is intended as peer to peer communication and may appear blunt or direct. It is written in medical language and may contain abbreviations or verbiage that are unfamiliar.

## 2024-11-17 NOTE — PLAN OF CARE
Pt A&Ox2, resting in bed with son at bedside.  Resp: RA, otherwise 1-2L O2 while sleeping (pt down to 88% otherwise)  Cardiac: NSR  GI/: PureWick in place  Activity/Safety: up wasst, walker & wheelchair  Skin: sacral redness, foam dressing in place  Pain: no pain reported at this time  Pt and updated on and agreeable to POC; IV fluids, IV abx, GI consult for poss ERCP

## 2024-11-17 NOTE — PHYSICAL THERAPY NOTE
PHYSICAL THERAPY EVALUATION - INPATIENT     Room Number: 304/304-A  Evaluation Date: 11/17/2024  Type of Evaluation: Initial  Physician Order: PT Eval and Treat    Presenting Problem: admit from [place] with hypertension. w/u concerning for acute cholecystitis. 11/15 s/p lap gunnar  Co-Morbidities : dementia, anxiety, hypothyroidism, HTN, HLD, PE on eliquis  Reason for Therapy: Mobility Dysfunction and Discharge Planning    PHYSICAL THERAPY ASSESSMENT   Patient is a 84 year old female admitted 11/15/2024 for hypertension, now s/p lap gunnar.  Prior to admission, per documentation, \"stand and pivot to wheelchair at baseline.\" Given pt's dementia, pt unable to provide hx regarding PLOF.  Patient is currently functioning below baseline with bed mobility, transfers, and gait.  Patient is requiring minimal assist and moderate assist as a result of the following impairments: decreased functional strength, decreased endurance/aerobic capacity, impaired seated/standing balance, and decreased muscular endurance.  Physical Therapy will continue to follow for duration of hospitalization.    Patient will benefit from continued skilled PT Services return to long term care with restorative therapies.    PLAN DURING HOSPITALIZATION  Nursing Mobility Recommendation : 1 Assist  PT Device Recommendation: Rolling walker  PT Treatment Plan: Bed mobility;Body mechanics;Endurance;Energy conservation;Patient education;Family education;Gait training;Neuromuscular re-educate;Range of motion;Strengthening;Transfer training;Balance training  Rehab Potential : Fair  Frequency (Obs): 3x/week     CURRENT GOALS    Goal #1 Patient is able to demonstrate supine - sit EOB @ level: supervision     Goal #2 Patient is able to demonstrate transfers Sit to/from Stand at assistance level: CGA with RW     Goal #3 Patient is able to ambulate 10 feet with assist device: walker - rolling at assistance level: minimum assistance     Goal #4    Goal #5     Goal #6    Goal Comments: Goals established on 2024      PHYSICAL THERAPY MEDICAL/SOCIAL HISTORY  History related to current admission: Patient is a 84 year old female admitted on 11/15/2024 from [facility] for hypertension. W/u concerning for acute cholecystitis. 11/15 s/p lap gunnar    HOME SITUATION  Type of Home: Skilled nursing facility                        Lives With: Staff 24 hours    Drives: No   Patient Regularly Uses: None      Prior Level of [place]: pt unable to provide hx 2' dementia. Per documentation, \"Stand and pivot to wheelchair at baseline\" pt reports she does not use RW.     SUBJECTIVE  \"I didn't want to fall\"  \"I want to know something about you\"    OBJECTIVE  Precautions: Bed/chair alarm  Fall Risk: High fall risk    WEIGHT BEARING RESTRICTION     PAIN ASSESSMENT  Ratin          COGNITION  Overall Cognitive Status:  Impaired  Attention Span:  attends with cues to redirect and difficulty attending to directions  Orientation Level:  disoriented to place, disoriented to time, disoriented to situation, and oriented to name, no   Memory:  impaired working memory  Following Commands:  follows one-step commands inconsistently, follows one step commands with increased time, and follows one step commands with repetition  Perseveration: perseverates during ADLs and perseverates during conversation  Safety Judgement:  decreased awareness of need for assistance  Awareness of Deficits:  decreased awareness of deficits    RANGE OF MOTION AND STRENGTH ASSESSMENT  Upper extremity ROM and strength are within functional limits     Lower extremity ROM is within functional limits     Lower extremity strength is within functional limits     BALANCE  Static Sitting: Fair  Dynamic Sitting: Fair  Static Standing: Poor +  Dynamic Standing: Poor    ADDITIONAL TESTS                                    ACTIVITY TOLERANCE                         O2 WALK       NEUROLOGICAL FINDINGS  Neurological  Findings: None                     AM-PAC '6-Clicks' INPATIENT SHORT FORM - BASIC MOBILITY  How much difficulty does the patient currently have...  Patient Difficulty: Turning over in bed (including adjusting bedclothes, sheets and blankets)?: A Little   Patient Difficulty: Sitting down on and standing up from a chair with arms (e.g., wheelchair, bedside commode, etc.): A Lot   Patient Difficulty: Moving from lying on back to sitting on the side of the bed?: A Little   How much help from another person does the patient currently need...   Help from Another: Moving to and from a bed to a chair (including a wheelchair)?: A Lot   Help from Another: Need to walk in hospital room?: A Lot   Help from Another: Climbing 3-5 steps with a railing?: Total     AM-PAC Score:  Raw Score: 13   Approx Degree of Impairment: 64.91%   Standardized Score (AM-PAC Scale): 36.74   CMS Modifier (G-Code): CL    FUNCTIONAL ABILITY STATUS  Gait Assessment   Functional Mobility/Gait Assessment  Gait Assistance: Moderate assistance  Distance (ft): 2  Assistive Device: Other (Comment) (gait belt + HHA)  Pattern: Within Functional Limits    Skilled Therapy Provided     Bed Mobility:  Rolling: Faustina  Sidelying > sit: Faustina, VC, incr time   Sit to supine: Faustina     Transfer Mobility:  Sit to stand: modA, HHA, gait belt, VC   Stand to sit: modA, HHA, gait belt, VC  Gait = modA    Therapist's Comments: pt educated on role of IP PT services, PT evaluation purpose, PT POC, PT goals, device recommendations, dc recommendations, and importance of mobility while hospitalized.   -vc throughout session for safety and sequencing. Incr time required to complete 2' incr cues needed as pt with difficulty following commands. While seated EOB, pt engaging in multi dir reaching without LOB. While standing, cues provided for upright and midline posture.     Exercise/Education Provided:  Bed mobility  Body mechanics  Energy conservation  Functional activity  tolerated  Neuromuscular re-educate  Posture  Strengthening  Transfer training    Patient End of Session: In bed;Needs met;Call light within reach;RN aware of session/findings;All patient questions and concerns addressed;Hospital anti-slip socks;SCDs in place;Alarm set      Patient Evaluation Complexity Level:  History Moderate - 1 or 2 personal factors and/or co-morbidities   Examination of body systems Moderate - addressing a total of 3 or more elements   Clinical Presentation Low- Stable   Clinical Decision Making Low Complexity       PT Session Time: 25 minutes  Therapeutic Activity: 15 minutes

## 2024-11-17 NOTE — H&P (VIEW-ONLY)
[facility]                       Gastroenterology Consultation-[facility]    Mar Hernandez Patient Status:  Inpatient    1940 MRN [id number]   Location [facility] 3NW-A Attending Ambrose Chatterjee,    Hosp Day # 2 PCP Dorothy Owens MD         Reason for consultation: bile leak  HPI: Ms. Hernandez is an 85 yo F with h/o PE on Eliquis, Alzheimer's, who presented to ER with abdominal pain and was found to have acute cholecystitis s/p cholecystectomy 11/15 c/b bile leak. She had increased output from NEMO drain and thus HIDA was ordered which confirmed bile leak. She has been afebrile. She denies significant abdominal pain but does have some tenderness on exam.   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    Skin surgery Left 2016    ED&C of SCCIS to L Upper Arm by SD    Skin surgery Left 2016    ED&C of SCCIS to L Upper Back by SD     Medications:    enoxaparin (Lovenox) 40 MG/0.4ML SUBQ injection 40 mg  40 mg Subcutaneous Daily    multivitamin (Tab-A-Carole/Beta Carotene) tab 1 tablet  1 tablet Oral Daily    [] sodium chloride 0.9% infusion   Intravenous Continuous    [COMPLETED] iopamidol 76% (ISOVUE-370) injection for power injector  100 mL Intravenous ONCE PRN    [COMPLETED] morphINE PF 4 MG/ML injection 2 mg  2 mg Intravenous Once    [] sodium chloride 0.9 % IV bolus 2,313 mL  30 mL/kg Intravenous Continuous    [COMPLETED] piperacillin-tazobactam (Zosyn) 4.5 g in dextrose 5% 100 mL IVPB-ADDV  4.5 g Intravenous Once    atorvastatin (Lipitor) tab 10 mg  10 mg Oral Nightly    donepezil (Aricept) tab 10 mg  10 mg Oral Daily    ipratropium  (Atrovent) 0.06 % nasal solution 1 spray  1 spray Nasal QID    levothyroxine (Synthroid) tab 75 mcg  75 mcg Oral Before breakfast    lisinopril (Prinivil; Zestril) tab 5 mg  5 mg Oral Daily    hydrALAzine (Apresoline) 20 mg/mL injection 10 mg  10 mg Intravenous Q6H PRN    labetalol (Trandate) 5 mg/mL injection 10 mg  10 mg Intravenous Q4H PRN    memantine (Namenda) tab 10 mg  10 mg Oral BID    QUEtiapine (SEROquel) tab 50 mg  50 mg Oral Nightly    traZODone (Desyrel) partial tab 25 mg  25 mg Oral Nightly    acetaminophen (Tylenol Extra Strength) tab 500 mg  500 mg Oral Q4H PRN    melatonin tab 3 mg  3 mg Oral Nightly PRN    morphINE PF 2 MG/ML injection 0.5 mg  0.5 mg Intravenous Q2H PRN    Or    morphINE PF 2 MG/ML injection 1 mg  1 mg Intravenous Q2H PRN    Or    morphINE PF 2 MG/ML injection 2 mg  2 mg Intravenous Q2H PRN    polyethylene glycol (PEG 3350) (Miralax) 17 g oral packet 17 g  17 g Oral Daily PRN    sennosides (Senokot) tab 17.2 mg  17.2 mg Oral Nightly PRN    bisacodyl (Dulcolax) 10 MG rectal suppository 10 mg  10 mg Rectal Daily PRN    fleet enema (Fleet) rectal enema 133 mL  1 enema Rectal Once PRN    ondansetron (Zofran) 4 MG/2ML injection 4 mg  4 mg Intravenous Q6H PRN    benzonatate (Tessalon) cap 200 mg  200 mg Oral TID PRN    guaiFENesin (Robitussin) 100 MG/5 ML oral liquid 200 mg  200 mg Oral Q4H PRN    glycerin-hypromellose- (Artificial Tears) 0.2-0.2-1 % ophthalmic solution 1 drop  1 drop Both Eyes QID PRN    sodium chloride (Saline Mist) 0.65 % nasal solution 1 spray  1 spray Each Nare Q3H PRN    piperacillin-tazobactam (Zosyn) 3.375 g in dextrose 5% 100 mL IVPB-ADDV  3.375 g Intravenous Q8H    acetaminophen (Ofirmev) 10 mg/mL infusion premix 1,000 mg  1,000 mg Intravenous Q6H    [COMPLETED] prothrombin complex conc (human) (Kcentra) 2,106 Units in 80 mL infusion  2,106 Units Intravenous Once    ketorolac (Toradol) 15 MG/ML injection 15 mg  15 mg Intravenous Q6H    traMADol (Ultram)  tab 50 mg  50 mg Oral 4 times per day    [COMPLETED] labetalol (Trandate) 5 mg/mL injection 5 mg  5 mg Intravenous Once     Allergies: Allergies[1]  SocHx:    Social History     Socioeconomic History    Marital status:    Tobacco Use    Smoking status: Never    Smokeless tobacco: Never   Substance and Sexual Activity    Alcohol use: Not Currently     Alcohol/week: 1.0 standard drink of alcohol     Types: 1 Glasses of wine per week     Comment: 1-2 per week    Drug use: No   Other Topics Concern    Caffeine Concern Yes     Comment: 1-2 per day    Exercise No    Seat Belt Yes     Social Drivers of Health     Food Insecurity: No Food Insecurity (11/15/2024)    Food Insecurity     Food Insecurity: Never true   Transportation Needs: No Transportation Needs (11/15/2024)    Transportation Needs     Lack of Transportation: No   Housing Stability: Low Risk  (11/15/2024)    Housing Stability     Housing Instability: No      FamHx:   Family History   Problem Relation Age of Onset    Other (Other) Father         Aortic Aneurysm    Hypertension Brother       ROS:  In addition to the pertinent positives described above:            Infectious Disease: See hpi            Cardiovascular: No history of CAD, prior MI, chest pain, or palpitations            Respiratory: h/o PE            Hematologic: The patient reports no easy bruising, frequent gum bleeding or nose bleeding;  The patient has no history of known chronic anemia            Dermatologic: The patient reports no recent rashes or chronic skin disorders            Rheumatologic: +arthralgias, recent hip arthroplasty            Genitourinary:  The patient reports no history of recurrent urinary tract infections, hematuria, dysuria, or nephrolithiasis           Psychiatric: The patient reports no history of depression, anxiety, suicidal ideation, or homicidal ideation           Oncologic: The patient reports on history of prior solid tumor or hematologic malignancy            ENT: The patient reports no hoarseness of voice, hearing loss, sinus congestion, tinnitus           Neurologic: The patient reports no history of seizure, stroke, or frequent headaches    PE: /65 (BP Location: Right arm)   Pulse 88   Temp 98.1 °F (36.7 °C) (Oral)   Resp 14   Ht 5' 7\" (1.702 m)   Wt 170 lb (77.1 kg)   SpO2 91%   BMI 26.63 kg/m²   Gen: Able to speak in complete sentences  HENT: NCAT, EOMI, oropharynx is clear with moist mucosal membranes  Eyes: Sclerae are anicteric  Neck:  Supple without nuchal rigidity; No lymphadenopathy  CV: Regular rate and rhythm, with normal S1 and S2; No S3; No murmurs  Resp: Clear to auscultation bilaterally without wheezes; rubs, rhonchi, or rales  Abdomen: Soft, mild RUQ tenderness, non-distended with the presence of bowel sounds; No hepatosplenomegaly; no rebound or guarding; No ascites is clinically apparent; no tympany to percussion; NEMO drain with bilious fluid  Ext: No peripheral edema or cyanosis  Skin: Warm and dry  Psychiatric: Appropriate mood and congruent affect without obvious depression or anxiety  Labs:   Lab Results   Component Value Date    WBC 13.3 11/17/2024    HGB 12.1 11/17/2024    HCT 37.5 11/17/2024    .0 11/17/2024    CREATSERUM 0.90 11/17/2024    BUN 22 11/17/2024     11/17/2024    K 3.6 11/17/2024     11/17/2024    CO2 25.0 11/17/2024     11/17/2024    CA 9.1 11/17/2024    ALB 3.3 11/17/2024    ALKPHO 73 11/17/2024    BILT 0.5 11/17/2024    AST 14 11/17/2024    ALT 13 11/17/2024     Recent Labs   Lab 11/15/24  1010 11/16/24  0558 11/17/24  0546   * 131* 113*   BUN 18 19 22   CREATSERUM 0.98 1.10* 0.90   CA 11.0* 9.1 9.1    140 138   K 3.8 4.1 3.6    110 109   CO2 25.0 24.0 25.0     Recent Labs   Lab 11/17/24  0546   RBC 3.97   HGB 12.1   HCT 37.5   MCV 94.5   MCH 30.5   MCHC 32.3   RDW 13.0   NEPRELIM 10.66*   WBC 13.3*   .0       Recent Labs   Lab 11/15/24  1010 11/17/24  0546    ALT 28 13   AST 23 14       Imaging:   HIDA:  Impression   CONCLUSION:  There is a bile leak with the radiotracer collecting within the gallbladder fossa and then exiting through the drainage catheter.     CT A/P 11/15:  Impression   CONCLUSION:    1. Findings concerning for acute cholecystitis  2. Small right pleural effusion with bibasilar atelectatic change     Impression:   Bile leak s/p cholecystectomy 11/15: pt does have NEMO drain in place currently  H/o PE: on Eliquis (last was 11/15) but this was also reversed prior to surgery    Recommendations:   Recommend ERCP; tentatively plan for tomorrow  NPO  Empiric Abx  CBC, CMP in am    Spoke with patient's son Kana, who is POA. The potentially life-threatening complications of infection, sedation, bleeding, pancreatitis, and perforation were reviewed along with the possible need for surgical management, transfusions, or repeat endoscopy should one of these complications arise. He understands and is agreeable.           Thank you for the consultation, we will follow the patient with you.    Jaelyn Evans DO  11:17 AM  11/17/2024  [facility]  [phone]             [1] No Known Allergies

## 2024-11-18 ENCOUNTER — APPOINTMENT (OUTPATIENT)
Dept: GENERAL RADIOLOGY | Facility: HOSPITAL | Age: 84
End: 2024-11-18
Attending: INTERNAL MEDICINE
Payer: MEDICARE

## 2024-11-18 ENCOUNTER — ANESTHESIA (OUTPATIENT)
Dept: ENDOSCOPY | Facility: HOSPITAL | Age: 84
End: 2024-11-18
Payer: MEDICARE

## 2024-11-18 ENCOUNTER — ANESTHESIA EVENT (OUTPATIENT)
Dept: ENDOSCOPY | Facility: HOSPITAL | Age: 84
End: 2024-11-18
Payer: MEDICARE

## 2024-11-18 ENCOUNTER — APPOINTMENT (OUTPATIENT)
Dept: GENERAL RADIOLOGY | Facility: HOSPITAL | Age: 84
DRG: 853 | End: 2024-11-18
Attending: INTERNAL MEDICINE
Payer: MEDICARE

## 2024-11-18 LAB
ALBUMIN SERPL-MCNC: 3.1 G/DL (ref 3.2–4.8)
ALBUMIN/GLOB SERPL: 1.4 {RATIO} (ref 1–2)
ALP LIVER SERPL-CCNC: 70 U/L
ALT SERPL-CCNC: 8 U/L
ANION GAP SERPL CALC-SCNC: 5 MMOL/L (ref 0–18)
AST SERPL-CCNC: 12 U/L (ref ?–34)
BASOPHILS # BLD AUTO: 0.03 X10(3) UL (ref 0–0.2)
BASOPHILS NFR BLD AUTO: 0.2 %
BILIRUB SERPL-MCNC: 0.5 MG/DL (ref 0.2–1.1)
BUN BLD-MCNC: 17 MG/DL (ref 9–23)
CALCIUM BLD-MCNC: 8.9 MG/DL (ref 8.7–10.4)
CHLORIDE SERPL-SCNC: 112 MMOL/L (ref 98–112)
CO2 SERPL-SCNC: 22 MMOL/L (ref 21–32)
CREAT BLD-MCNC: 0.74 MG/DL
EGFRCR SERPLBLD CKD-EPI 2021: 81 ML/MIN/1.73M2 (ref 60–?)
EOSINOPHIL # BLD AUTO: 0.15 X10(3) UL (ref 0–0.7)
EOSINOPHIL NFR BLD AUTO: 1.2 %
ERYTHROCYTE [DISTWIDTH] IN BLOOD BY AUTOMATED COUNT: 12.8 %
GLOBULIN PLAS-MCNC: 2.2 G/DL (ref 2–3.5)
GLUCOSE BLD-MCNC: 93 MG/DL (ref 70–99)
HCT VFR BLD AUTO: 35.1 %
HGB BLD-MCNC: 11.3 G/DL
IMM GRANULOCYTES # BLD AUTO: 0.11 X10(3) UL (ref 0–1)
IMM GRANULOCYTES NFR BLD: 0.9 %
LYMPHOCYTES # BLD AUTO: 1.36 X10(3) UL (ref 1–4)
LYMPHOCYTES NFR BLD AUTO: 10.8 %
MCH RBC QN AUTO: 30.1 PG (ref 26–34)
MCHC RBC AUTO-ENTMCNC: 32.2 G/DL (ref 31–37)
MCV RBC AUTO: 93.6 FL
MONOCYTES # BLD AUTO: 1.14 X10(3) UL (ref 0.1–1)
MONOCYTES NFR BLD AUTO: 9 %
NEUTROPHILS # BLD AUTO: 9.82 X10 (3) UL (ref 1.5–7.7)
NEUTROPHILS # BLD AUTO: 9.82 X10(3) UL (ref 1.5–7.7)
NEUTROPHILS NFR BLD AUTO: 77.9 %
OSMOLALITY SERPL CALC.SUM OF ELEC: 289 MOSM/KG (ref 275–295)
PLATELET # BLD AUTO: 166 10(3)UL (ref 150–450)
POTASSIUM SERPL-SCNC: 3.4 MMOL/L (ref 3.5–5.1)
PROT SERPL-MCNC: 5.3 G/DL (ref 5.7–8.2)
RBC # BLD AUTO: 3.75 X10(6)UL
SODIUM SERPL-SCNC: 139 MMOL/L (ref 136–145)
WBC # BLD AUTO: 12.6 X10(3) UL (ref 4–11)

## 2024-11-18 PROCEDURE — 0F798DZ DILATION OF COMMON BILE DUCT WITH INTRALUMINAL DEVICE, VIA NATURAL OR ARTIFICIAL OPENING ENDOSCOPIC: ICD-10-PCS | Performed by: INTERNAL MEDICINE

## 2024-11-18 PROCEDURE — 74328 X-RAY BILE DUCT ENDOSCOPY: CPT | Performed by: INTERNAL MEDICINE

## 2024-11-18 PROCEDURE — 99233 SBSQ HOSP IP/OBS HIGH 50: CPT | Performed by: INTERNAL MEDICINE

## 2024-11-18 PROCEDURE — BF101ZZ FLUOROSCOPY OF BILE DUCTS USING LOW OSMOLAR CONTRAST: ICD-10-PCS | Performed by: INTERNAL MEDICINE

## 2024-11-18 DEVICE — COTTON-LEUNG BILIARY STENT
Type: IMPLANTABLE DEVICE | Status: FUNCTIONAL
Brand: COTTON-LEUNG

## 2024-11-18 RX ORDER — SODIUM CHLORIDE 9 MG/ML
INJECTION, SOLUTION INTRAVENOUS CONTINUOUS
Status: ACTIVE | OUTPATIENT
Start: 2024-11-18 | End: 2024-11-18

## 2024-11-18 RX ORDER — POTASSIUM CHLORIDE 1500 MG/1
40 TABLET, EXTENDED RELEASE ORAL ONCE
Status: COMPLETED | OUTPATIENT
Start: 2024-11-18 | End: 2024-11-18

## 2024-11-18 RX ORDER — LIDOCAINE HYDROCHLORIDE 10 MG/ML
INJECTION, SOLUTION EPIDURAL; INFILTRATION; INTRACAUDAL; PERINEURAL AS NEEDED
Status: DISCONTINUED | OUTPATIENT
Start: 2024-11-18 | End: 2024-11-18 | Stop reason: SURG

## 2024-11-18 RX ORDER — PANTOPRAZOLE SODIUM 40 MG/1
40 TABLET, DELAYED RELEASE ORAL
Status: DISCONTINUED | OUTPATIENT
Start: 2024-11-18 | End: 2024-11-19

## 2024-11-18 RX ORDER — SODIUM CHLORIDE, SODIUM LACTATE, POTASSIUM CHLORIDE, CALCIUM CHLORIDE 600; 310; 30; 20 MG/100ML; MG/100ML; MG/100ML; MG/100ML
INJECTION, SOLUTION INTRAVENOUS CONTINUOUS PRN
Status: DISCONTINUED | OUTPATIENT
Start: 2024-11-18 | End: 2024-11-18 | Stop reason: SURG

## 2024-11-18 RX ADMIN — LIDOCAINE HYDROCHLORIDE 100 MG: 10 INJECTION, SOLUTION EPIDURAL; INFILTRATION; INTRACAUDAL; PERINEURAL at 16:03:00

## 2024-11-18 RX ADMIN — SODIUM CHLORIDE, SODIUM LACTATE, POTASSIUM CHLORIDE, CALCIUM CHLORIDE: 600; 310; 30; 20 INJECTION, SOLUTION INTRAVENOUS at 15:58:00

## 2024-11-18 NOTE — PROGRESS NOTES
A&Ox1. RA-2L nc. VSS.   Tele- ST(100s)  Lap sitesx5- skin glue and intact.   NEMO drain- brown drainage.   Purewick in place. Mepilex in place on her sacrum.   Pain managed per MAR.   NPO for ERCP tomorrow.

## 2024-11-18 NOTE — ANESTHESIA PREPROCEDURE EVALUATION
PRE-OP EVALUATION    Patient Name: Mar Hernandez    Admit Diagnosis: Acute cholecystitis [K81.0]  Sepsis, due to unspecified organism, unspecified whether acute organ dysfunction present (HCC) [A41.9]    Pre-op Diagnosis: Bile leak [K83.9]    ENDOSCOPIC RETROGRADE CHOLANGIOPANCREATOGRAPHY (ERCP)    Anesthesia Procedure: ENDOSCOPIC RETROGRADE CHOLANGIOPANCREATOGRAPHY (ERCP)    Surgeons and Role:     * Martínez Blakely, DO - Primary    Pre-op vitals reviewed.  Temp: 96.6 °F (35.9 °C)  Pulse: 87  Resp: 14  BP: 140/85  SpO2: 94 %  Body mass index is 26.63 kg/m².    Current medications reviewed.  Hospital Medications:   [COMPLETED] potassium chloride (Klor-Con M20) tab 40 mEq  40 mEq Oral Once    sodium chloride 0.9% infusion   Intravenous Continuous    [] sodium chloride 0.9% infusion   Intravenous Continuous    enoxaparin (Lovenox) 40 MG/0.4ML SUBQ injection 40 mg  40 mg Subcutaneous Daily    multivitamin (Tab-A-Carole/Beta Carotene) tab 1 tablet  1 tablet Oral Daily    [] sodium chloride 0.9% infusion   Intravenous Continuous    [COMPLETED] iopamidol 76% (ISOVUE-370) injection for power injector  100 mL Intravenous ONCE PRN    [COMPLETED] morphINE PF 4 MG/ML injection 2 mg  2 mg Intravenous Once    [] sodium chloride 0.9 % IV bolus 2,313 mL  30 mL/kg Intravenous Continuous    [COMPLETED] piperacillin-tazobactam (Zosyn) 4.5 g in dextrose 5% 100 mL IVPB-ADDV  4.5 g Intravenous Once    atorvastatin (Lipitor) tab 10 mg  10 mg Oral Nightly    donepezil (Aricept) tab 10 mg  10 mg Oral Daily    ipratropium (Atrovent) 0.06 % nasal solution 1 spray  1 spray Nasal QID    levothyroxine (Synthroid) tab 75 mcg  75 mcg Oral Before breakfast    lisinopril (Prinivil; Zestril) tab 5 mg  5 mg Oral Daily    hydrALAzine (Apresoline) 20 mg/mL injection 10 mg  10 mg Intravenous Q6H PRN    labetalol (Trandate) 5 mg/mL injection 10 mg  10 mg Intravenous Q4H PRN    memantine (Namenda) tab 10 mg  10 mg Oral BID     QUEtiapine (SEROquel) tab 50 mg  50 mg Oral Nightly    traZODone (Desyrel) partial tab 25 mg  25 mg Oral Nightly    acetaminophen (Tylenol Extra Strength) tab 500 mg  500 mg Oral Q4H PRN    melatonin tab 3 mg  3 mg Oral Nightly PRN    morphINE PF 2 MG/ML injection 0.5 mg  0.5 mg Intravenous Q2H PRN    Or    morphINE PF 2 MG/ML injection 1 mg  1 mg Intravenous Q2H PRN    Or    morphINE PF 2 MG/ML injection 2 mg  2 mg Intravenous Q2H PRN    polyethylene glycol (PEG 3350) (Miralax) 17 g oral packet 17 g  17 g Oral Daily PRN    sennosides (Senokot) tab 17.2 mg  17.2 mg Oral Nightly PRN    bisacodyl (Dulcolax) 10 MG rectal suppository 10 mg  10 mg Rectal Daily PRN    fleet enema (Fleet) rectal enema 133 mL  1 enema Rectal Once PRN    ondansetron (Zofran) 4 MG/2ML injection 4 mg  4 mg Intravenous Q6H PRN    benzonatate (Tessalon) cap 200 mg  200 mg Oral TID PRN    guaiFENesin (Robitussin) 100 MG/5 ML oral liquid 200 mg  200 mg Oral Q4H PRN    glycerin-hypromellose- (Artificial Tears) 0.2-0.2-1 % ophthalmic solution 1 drop  1 drop Both Eyes QID PRN    sodium chloride (Saline Mist) 0.65 % nasal solution 1 spray  1 spray Each Nare Q3H PRN    piperacillin-tazobactam (Zosyn) 3.375 g in dextrose 5% 100 mL IVPB-ADDV  3.375 g Intravenous Q8H    acetaminophen (Ofirmev) 10 mg/mL infusion premix 1,000 mg  1,000 mg Intravenous Q6H    [COMPLETED] prothrombin complex conc (human) (Kcentra) 2,106 Units in 80 mL infusion  2,106 Units Intravenous Once    [COMPLETED] ketorolac (Toradol) 15 MG/ML injection 15 mg  15 mg Intravenous Q6H    traMADol (Ultram) tab 50 mg  50 mg Oral 4 times per day    [COMPLETED] labetalol (Trandate) 5 mg/mL injection 5 mg  5 mg Intravenous Once       Outpatient Medications:   Prescriptions Prior to Admission[1]    Allergies: Patient has no known allergies.      Anesthesia Evaluation    Patient summary reviewed.    Anesthetic Complications  (-) history of anesthetic complications          GI/Hepatic/Renal    Negative GI/hepatic/renal ROS.                             Cardiovascular      ECG reviewed.      MET: >4  Unable to assess MET.    (+) hypertension                                     Endo/Other           (-) hypothyroidism                       Pulmonary  Comment: Hx of PE on AC                         Neuro/Psych                     (+) psychiatric history         Advanced alzheimer   Patient Active Problem List:     Primary hypertension     Acquired hypothyroidism     Intermediate stage nonexudative age-related macular degeneration of both eyes     Severe dementia with agitation (HCC)     Physical deconditioning     Closed fracture of left hip, initial encounter (HCC)     Nausea and vomiting     Atelectasis     Hypoxia     Acute hypoxemic respiratory failure (HCC)     Pulmonary embolus (HCC)     Acute cholecystitis     Sepsis, due to unspecified organism, unspecified whether acute organ dysfunction present (HCC)            Past Surgical History:   Procedure Laterality Date    Hip fracture surgery Right 02/03/2024    repaired at [facility]    Hysterectomy      Skin surgery Left 04/27/2016    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    Skin surgery Left 04/27/2016    ED&C of SCCIS to L Upper Arm by SD    Skin surgery Left 04/27/2016    ED&C of SCCIS to L Upper Back by SD     Social History     Socioeconomic History    Marital status:    Tobacco Use    Smoking status: Never    Smokeless tobacco: Never   Substance and Sexual Activity    Alcohol use: Not Currently     Alcohol/week: 1.0 standard drink of alcohol     Types: 1 Glasses of wine per week     Comment: 1-2 per week    Drug use: No   Other Topics Concern    Caffeine Concern Yes     Comment: 1-2 per day    Exercise No    Seat Belt Yes     History   Drug Use No     Available pre-op labs reviewed.  Lab Results   Component Value Date    WBC 12.6 (H) 11/18/2024    RBC 3.75 (L) 11/18/2024    HGB 11.3 (L) 11/18/2024    HCT 35.1 11/18/2024     MCV 93.6 11/18/2024    MCH 30.1 11/18/2024    MCHC 32.2 11/18/2024    RDW 12.8 11/18/2024    .0 11/18/2024     Lab Results   Component Value Date     11/18/2024    K 3.4 (L) 11/18/2024     11/18/2024    CO2 22.0 11/18/2024    BUN 17 11/18/2024    CREATSERUM 0.74 11/18/2024    GLU 93 11/18/2024    CA 8.9 11/18/2024            Airway      Mallampati: II  Mouth opening: 3 FB  TM distance: 4 - 6 cm  Neck ROM: full Cardiovascular    Cardiovascular exam normal.         Dental    Dentition appears grossly intact         Pulmonary    Pulmonary exam normal.                 Other findings              ASA: 3   Plan: MAC  NPO status verified and patient meets guidelines.          Plan/risks discussed with: patient, child/children and healthcare power of                 Present on Admission:  **None**             [1]   Medications Prior to Admission   Medication Sig Dispense Refill Last Dose/Taking    acetaminophen 500 MG Oral Tab Take 1 tablet (500 mg total) by mouth 2 (two) times daily.   11/14/2024 at  5:00 PM    acetaminophen 325 MG Oral Tab Take 2 tablets (650 mg total) by mouth every 6 (six) hours as needed for Pain or Fever.   Taking As Needed    atorvastatin 10 MG Oral Tab Take 1 tablet (10 mg total) by mouth at bedtime.   11/14/2024 at  9:00 PM    bisacodyl 5 MG Oral Tab EC Take 2 tablets (10 mg total) by mouth every 12 (twelve) hours as needed for constipation.   Taking As Needed    ELIQUIS 2.5 MG Oral Tab Take 1 tablet (2.5 mg total) by mouth 2 (two) times daily.   11/14/2024 at  5:00 PM    hydrALAZINE 10 MG Oral Tab Take 1 tablet (10 mg total) by mouth every 6 (six) hours as needed (for SBP > 160).   11/15/2024 at  8:15 AM    lisinopril 5 MG Oral Tab Take 1 tablet (5 mg total) by mouth daily.   11/15/2024 at  8:15 AM    polyethylene glycol, PEG 3350, 17 g Oral Powd Pack Take 17 g by mouth daily.   11/14/2024 at  9:00 AM    senna-docusate 8.6-50 MG Oral Tab Take 1 tablet by mouth 2 (two)  times daily.   11/14/2024 at  5:00 PM    QUEtiapine 50 MG Oral Tab Take 1 tablet (50 mg total) by mouth at bedtime.   11/14/2024 at  8:00 PM    benzonatate 100 MG Oral Cap Take 1 capsule (100 mg total) by mouth every 8 (eight) hours as needed for cough.   Taking As Needed    traZODone 25 mg Oral Tab Take 1 Partial Tablet (25 mg total) by mouth at bedtime.   11/14/2024 at  8:00 PM    fexofenadine 180 MG Oral Tab Take 1 tablet (180 mg total) by mouth Q24H PRN (for sinus headaches, post nasal drip). 30 tablet 0 Taking As Needed    Ipratropium Bromide 0.06 % Nasal Solution 1 spray by Nasal route 4 (four) times daily. 1 each 0 11/14/2024 at  9:00 PM    MEMANTINE HCL 10 MG Oral Tab TAKE 1 TAB BY MOUTH TWICE DAILY 90 tablet 3 11/14/2024 at  5:00 PM    LEVOTHYROXINE SODIUM 75 MCG Oral Tab TAKE 1 TAB BY MOUTH EVERY MORNING BEFORE BREAKFAST 90 tablet 0 11/15/2024 at  6:00 AM    CALCIUM + VITAMIN D3 600-10 MG-MCG Oral Tab TAKE 1 TAB BY MOUTH TWICE DAILY 90 tablet 3 11/14/2024 at  5:00 PM    VITAMIN B-12 250 MCG Oral Tab TAKE 1 TAB BY MOUTH DAILY 90 tablet 3 11/14/2024 at  9:00 AM    Donepezil HCl 10 MG Oral Tab Take 1 tablet (10 mg total) by mouth daily. 90 tablet 3 11/14/2024 at  9:00 AM    Multiple Vitamins-Minerals (PRESERVISION AREDS) Oral Tab Take 1 capsule by mouth 2 (two) times daily. 180 tablet 3 11/14/2024 at  5:00 PM

## 2024-11-18 NOTE — PLAN OF CARE
Patient is alert and oriented x1. Patient is drowsy this morning. On room air during day. Abdomen is soft/ rounded. Lap sites x5 closed with skin glue-c/d/I/nicole. NEMO drain x1 with brown output. Voiding via purewick. Incontinent. Receiving IVF and IV Abx. Up with one person assist and walker. Patient currently NPO for ERCP later this afternoon. Consent signed by son and is in the chart.

## 2024-11-18 NOTE — INTERVAL H&P NOTE
Pre-op Diagnosis: Bile leak [K83.9]    The above referenced H&P was reviewed by Martínez Blakely DO on 11/18/2024, the patient was examined and no significant changes have occurred in the patient's condition since the H&P was performed.  I discussed with the patient and/or legal representative the potential benefits, risks and side effects of this procedure; the likelihood of the patient achieving goals; and potential problems that might occur during recuperation.  I discussed reasonable alternatives to the procedure, including risks, benefits and side effects related to the alternatives and risks related to not receiving this procedure.  We will proceed with procedure as planned.

## 2024-11-18 NOTE — ANESTHESIA POSTPROCEDURE EVALUATION
[facility]    Mar Hernandez Patient Status:  Inpatient   Age/Gender 84 year old female MRN [id number]   Location [facility] Attending Ambrose Chatterjee DO   Hosp Day # 3 PCP Dorothy Owens MD       Anesthesia Post-op Note    ENDOSCOPIC RETROGRADE CHOLANGIOPANCREATOGRAPHY (ERCP) WITH SPHINCTEROTOMY, BILIARY STENT PLACEMENT, BALLOON SWEEP    Procedure Summary       Date: 11/18/24 Room / Location:  ENDOSCOPY 04 / [facility]    Anesthesia Start: 1558 Anesthesia Stop: 1627    Procedure: ENDOSCOPIC RETROGRADE CHOLANGIOPANCREATOGRAPHY (ERCP) WITH SPHINCTEROTOMY, BILIARY STENT PLACEMENT, BALLOON SWEEP Diagnosis:       Bile leak      (BILE LEAK, STENT PLACEMENT)    Surgeons: Martínez Blakely DO Anesthesiologist: Myerson, David, MD    Anesthesia Type: MAC ASA Status: 3            Anesthesia Type: MAC    Vitals Value Taken Time   /76 11/18/24 1627   Temp 97.6 °F (36.4 °C) 11/18/24 1627   Pulse 91 11/18/24 1627   Resp 16 11/18/24 1627   SpO2 92 % 11/18/24 1627       Patient Location: Endoscopy    Anesthesia Type: MAC    Airway Patency: patent    Postop Pain Control: adequate    Mental Status: preanesthetic baseline    Nausea/Vomiting: none    Cardiopulmonary/Hydration status: stable euvolemic    Complications: no apparent anesthesia related complications    Postop vital signs: stable    Dental Exam: Unchanged from Preop    Patient to be discharged from PACU when criteria met.

## 2024-11-18 NOTE — PROGRESS NOTES
[facility]   part of [facility]     Hospitalist Progress Note     Mar Hernandez Patient Status:  Inpatient    1940 MRN [id number]   Location [facility] 3NW-A Attending Ambrose Chatterjee,    Hosp Day # 3 PCP Dorothy Owens MD     Chief Complaint: Elevated BP, abdominal pain    Subjective:     Son at bedside who reports patient sometimes reports RUQ pain with moving. She prefers having abdominal binder off. NPO for ERCP today.    Objective:    Review of Systems:   A comprehensive review of systems was completed; pertinent positive and negatives stated in subjective.    Vital signs:  Temp:  [97.9 °F (36.6 °C)-99.6 °F (37.6 °C)] 98.4 °F (36.9 °C)  Pulse:  [90-97] 96  Resp:  [17-23] 22  BP: (137-161)/(66-84) 152/78  SpO2:  [90 %-95 %] 91 %    Physical Exam:    General: No acute distress, alert but with baseline dementia  Respiratory: No rhonchi, no wheezes  Cardiovascular: S1, S2. Regular rate and rhythm  Abdomen: Soft, non-distended, mild RUQ tenderness. Abdominal binder in place. RLQ NEMO drain with brownish fluid  Extremities: No edema    Diagnostic Data:    Labs:  Recent Labs   Lab 11/15/24  1010 24  0558 24  0546 24  0522   WBC 23.9* 19.2* 13.3* 12.6*   HGB 15.4 12.8 12.1 11.3*   MCV 90.7 92.3 94.5 93.6   .0 154.0 175.0 166.0     Recent Labs   Lab 11/15/24  1010 24  0558 24  0546 24  0522   * 131* 113* 93   BUN 18 19 22 17   CREATSERUM 0.98 1.10* 0.90 0.74   CA 11.0* 9.1 9.1 8.9   ALB 4.4  --  3.3 3.1*    140 138 139   K 3.8 4.1 3.6 3.4*    110 109 112   CO2 25.0 24.0 25.0 22.0   ALKPHO 95  --  73 70   AST 23  --  14 12   ALT 28  --  13 8*   BILT 0.9  --  0.5 0.5   TP 8.1  --  5.7 5.3*     Estimated Creatinine Clearance: 57 mL/min (based on SCr of 0.74 mg/dL).    Recent Labs   Lab 11/15/24  1010   TROPHS 7     No results for input(s): \"PTP\", \"INR\" in the last 168 hours.     Microbiology  Hospital Encounter on 11/15/24   1. Aerobic  Bacterial Culture     Status: None (Preliminary result)    Collection Time: 11/15/24  7:05 PM    Specimen: Gallbladder; Body fluid, unspecified   Result Value Ref Range    Aerobic Culture Result No Growth 2 Days N/A    Aerobic Smear No WBCs seen N/A    Aerobic Smear No organisms seen N/A   2. Blood Culture     Status: None (Preliminary result)    Collection Time: 11/15/24 12:19 PM    Specimen: Blood,peripheral   Result Value Ref Range    Blood Culture Result No Growth 2 Days N/A     Imaging: Reviewed in Epic.    Medications:    enoxaparin  40 mg Subcutaneous Daily    multivitamin  1 tablet Oral Daily    atorvastatin  10 mg Oral Nightly    donepezil  10 mg Oral Daily    ipratropium  1 spray Nasal QID    levothyroxine  75 mcg Oral Before breakfast    lisinopril  5 mg Oral Daily    memantine  10 mg Oral BID    QUEtiapine  50 mg Oral Nightly    traZODone  25 mg Oral Nightly    piperacillin-tazobactam  3.375 g Intravenous Q8H    acetaminophen  1,000 mg Intravenous Q6H    traMADol  50 mg Oral 4 times per day       Assessment & Plan:      #Acute gangrenous cholecystitis complicated by bile leak  -S/p laparoscopic cholecystectomy by general surgery on 11/15  -NPO, IVF, pain control, PRN-antiemetics  -HIDA scan with bile leak, GI consulted and plan for ERCP today  -Continue zosyn, follow blood and intra-op cultures. NGTD  -General surgery consulted    #Sepsis due to above  #Lactic acidosis  -S/p sepsis bolus in ER  -Lactic acid trended down  -Follow cultures, NGTD  -Zosyn     #Leukocytosis due to above  -Monitor, improving     #Severe dementia with behavioral disturbances  #Anxiety  -Patient was in memory care since 2020 but transferred to skilled nursing after hip fracture surgery in January 2024  -Resume home donezepil, memantine, trazodone and Seroquel     #Hypothyroidism  -Levothyroxine     #Hypertension  -Continue home lisinopril  -PRN hydralazine and labetalol ordered     #Hyperlipidemia  -Statin     #PE in Feb 2024,  likely provoked after hip surgery  -Patient has completed >6 month anticoagulation after hip surgery. Stop Eliquis going forward. Son also agrees. We discussed risk/benefits and he wishes to stop  -Lovenox for DVT ppx      Ambrose Chatterjee,     Supplementary Documentation:     Quality:  DVT Mechanical Prophylaxis:   SCDs, Early ambuation  DVT Pharmacologic Prophylaxis   Medication    enoxaparin (Lovenox) 40 MG/0.4ML SUBQ injection 40 mg     Code Status: DNAR/Selective Treatment  Ott: External urinary catheter in place  DELICIA: 1-2 days    Discharge is dependent on: Clinical state, cultures, consultant recs  At this point Ms. Hernandez is expected to be discharge to: [facility]    The 21st Century Cures Act makes medical notes like these available to patients in the interest of transparency. Please be advised this is a medical document. Medical documents are intended to carry relevant information, facts as evident, and the clinical opinion of the practitioner. The medical note is intended as peer to peer communication and may appear blunt or direct. It is written in medical language and may contain abbreviations or verbiage that are unfamiliar.

## 2024-11-18 NOTE — OPERATIVE REPORT
Mar Hernandez Patient Status:  Inpatient    1940 MRN [id number]   [facility] Attending Ambrose Chatterjee,    Hosp Day # 3 PCP Dorothy Owens MD     PREOPERATIVE DIAGNOSIS/INDICATION: Bile Leak  POSTOPERTATIVE DIAGNOSIS: See Impression  PROCEDURE PERFORMED: ERCP WITH SPHINCTEROTOMY AND PLASTIC BILIARY STENT PLACEMENT  DATE: 24  TIME OUT WAS PERFORMED    SEDATION: MAC sedation provided by General Anesthesia    INFORMED CONSENT: I have discussed the risks, benefits, and alternatives to endoscopic retrograde cholangiopancreatography (ERCP) with possible intervention [i.e. sphincterotomy, stent placement etc.] with the patient including but not limited to the risks of bleeding, cholangitis, cholecystitis, pancreatitis, pain, as well as the risks of anesthesia and perforation all possibly leading to prolonged hospitalization and surgical intervention. All questions were answered to the patient’s satisfaction. The patient elected to proceed with ERCP with intervention as indicated.  PROCEDURE DESCRIPTION: The duodenoscope was introduced into the patient’s mouth, hypo pharynx, esophagus, stomach and the first and second portion of the duodenum, straightening of the endoscope was performed to obtain a direct view of the major ampulla. Careful but limited examination of the above described areas was performed on withdrawal of the endoscope. The patient tolerated the procedure well and there were no immediate complications noted during the procedure, the patient was transported to the recovery area in stable condition.  FINDINGS:  A  film was taken. The duodenoscope was advanced to the ampulla which was normal in appearance. Selective cannulation of the common bile duct was achieved deeply and freely using an Rx 44 sphincterotome loaded with a 0.035 jagwire. The wire was advanced to the intrahepatics. Contrast was injected to confirm CBD cannulation.  The CBD was  dilated measuring 9 mm. Biliary sphincterotomy was performed using ERBE endocut electrocautery. There was no post sphincterotomy bleeding. The wire was left in place and the sphincterotome was exchanged for a 9-12 mm balloon catheter. The balloon was inflated to 12 mm and an occlusion cholangiogram was obtained. There was filling of the cystic duct stump. No overt bile leak was visualized however given positive HIDA scan decision made to place a 10 Fr x 7 cm plastic biliary stent. The stent introducer was advanced over the wire and deployed into the CBD under endoscopic and fluoroscopic guidance. The duodenoscope was withdrawn to the stomach and all contents were suctioned. The duodenoscope was withdrawn from the patient and the procedure completed. Multiple superficial duodenal ulcers visualized endoscopically.     IMPRESSION:   1. No overt bile leak on cholangiogram however clinically apparent on HIDA and bilious NEMO output c/w bile leak.    2. Biliary sphincterotomy and placement of a 10 Fr x 7 cm plastic biliary stent.    3. Duodenal ulcerations.   RECOMMENDATIONS:    1. Watch for complications (i.e. bleeding, infection, perforation, cardiopulmonary complications and pancreatitis.)   2. Clear liquid diet advance as tolerated.    3. Repeat ERCP in 8 weeks for stent removal, re-inspection of bile duct.     4. Pantoprazole 40 mg bid x 8 weeks.     [facility]ANE [facility]  Gastroenterology/Advanced Endoscopy  [facility]

## 2024-11-18 NOTE — PROGRESS NOTES
[facility]  General Surgery Progress Note    Mar Hernandez Patient Status:  Inpatient    1940 MRN [id number]   Location [facility] 3NW-A Attending Ambrose Chatterjee,    Hosp Day # 3 PCP Dorothy Owens MD     Subjective:  No issues overnight.  N.p.o. for possible ERCP today.    Objective/Physical Exam:      Intake/Output Summary (Last 24 hours) at 2024 1034  Last data filed at 2024 0648  Gross per 24 hour   Intake 2910 ml   Output 855 ml   Net 2055 ml       Vital Signs:  Blood pressure 152/78, pulse 96, temperature 98.4 °F (36.9 °C), temperature source Oral, resp. rate 22, height 67\", weight 170 lb (77.1 kg), SpO2 91%.    General: Alert, orientated x3.  Cooperative.  No apparent distress.  HEENT: Exam is unremarkable.  Without scleral icterus.  Mucous membranes are moist. Oropharynx is clear.  Neck: No JVD. Supple.   Lungs: Non labored breathing, equal chest rise  Cardiac: Regular rate and rhythm. No murmur.  Abdomen:  Soft, non-distended, non-tender, with no rebound or guarding.  No peritoneal signs.  Surgical incisions healing well, no erythema or drainage.  NEMO with bilious output  Extremities:  No lower extremity edema noted.  Without clubbing or cyanosis.  2+ pulses x4, motor and sensation grossly intact      Labs:  Lab Results   Component Value Date    WBC 12.6 2024    RBC 3.75 2024    HGB 11.3 2024    HCT 35.1 2024    MCV 93.6 2024    MCH 30.1 2024    MCHC 32.2 2024    RDW 12.8 2024    .0 2024     Lab Results   Component Value Date     2024    K 3.4 2024     2024    CO2 22.0 2024    BUN 17 2024    CREATSERUM 0.74 2024    GLU 93 2024    CA 8.9 2024    ALKPHO 70 2024    ALT 8 2024    AST 12 2024    BILT 0.5 2024    ALB 3.1 2024    TP 5.3 2024          Images:  No results found.    Assessment/Plan:  Patient Active Problem List    Diagnosis    Primary hypertension    Acquired hypothyroidism    Intermediate stage nonexudative age-related macular degeneration of both eyes    Severe dementia with agitation (HCC)    Physical deconditioning    Closed fracture of left hip, initial encounter ([facility])    Nausea and vomiting    Atelectasis    Hypoxia    Acute hypoxemic respiratory failure (HCC)    Pulmonary embolus (HCC)    Acute cholecystitis    Sepsis, due to unspecified organism, unspecified whether acute organ dysfunction present (HCC)       84 year old female with acute gangrenous cholecystitis status post laparoscopic cholecystectomy and IOC complicated by bile leak    No acute intervention  GI for ERCP today  Patient can have clears and advance as tolerated once procedure is done  Change NEMO bulb once ERCP is completed  Surgery will continue to follow  Rest of care per primary    Sumaya Garcia MD  [facility]  11/18/2024  10:34 AM

## 2024-11-19 VITALS
DIASTOLIC BLOOD PRESSURE: 85 MMHG | HEART RATE: 91 BPM | WEIGHT: 170 LBS | OXYGEN SATURATION: 92 % | SYSTOLIC BLOOD PRESSURE: 168 MMHG | TEMPERATURE: 99 F | HEIGHT: 67 IN | RESPIRATION RATE: 17 BRPM | BODY MASS INDEX: 26.68 KG/M2

## 2024-11-19 LAB
ALBUMIN SERPL-MCNC: 3.3 G/DL (ref 3.2–4.8)
ALBUMIN/GLOB SERPL: 1 {RATIO} (ref 1–2)
ALP LIVER SERPL-CCNC: 88 U/L
ALT SERPL-CCNC: 12 U/L
ANION GAP SERPL CALC-SCNC: 2 MMOL/L (ref 0–18)
AST SERPL-CCNC: 25 U/L (ref ?–34)
BASOPHILS # BLD AUTO: 0.04 X10(3) UL (ref 0–0.2)
BASOPHILS NFR BLD AUTO: 0.3 %
BILIRUB SERPL-MCNC: 0.5 MG/DL (ref 0.2–1.1)
BUN BLD-MCNC: 12 MG/DL (ref 9–23)
CALCIUM BLD-MCNC: 9.5 MG/DL (ref 8.7–10.4)
CHLORIDE SERPL-SCNC: 113 MMOL/L (ref 98–112)
CO2 SERPL-SCNC: 22 MMOL/L (ref 21–32)
CREAT BLD-MCNC: 0.75 MG/DL
EGFRCR SERPLBLD CKD-EPI 2021: 79 ML/MIN/1.73M2 (ref 60–?)
EOSINOPHIL # BLD AUTO: 0.3 X10(3) UL (ref 0–0.7)
EOSINOPHIL NFR BLD AUTO: 2.2 %
ERYTHROCYTE [DISTWIDTH] IN BLOOD BY AUTOMATED COUNT: 13 %
GLOBULIN PLAS-MCNC: 3.2 G/DL (ref 2–3.5)
GLUCOSE BLD-MCNC: 96 MG/DL (ref 70–99)
HCT VFR BLD AUTO: 39.9 %
HGB BLD-MCNC: 12.5 G/DL
IMM GRANULOCYTES # BLD AUTO: 0.16 X10(3) UL (ref 0–1)
IMM GRANULOCYTES NFR BLD: 1.2 %
LYMPHOCYTES # BLD AUTO: 2.43 X10(3) UL (ref 1–4)
LYMPHOCYTES NFR BLD AUTO: 17.5 %
MCH RBC QN AUTO: 30.5 PG (ref 26–34)
MCHC RBC AUTO-ENTMCNC: 31.3 G/DL (ref 31–37)
MCV RBC AUTO: 97.3 FL
MONOCYTES # BLD AUTO: 1.55 X10(3) UL (ref 0.1–1)
MONOCYTES NFR BLD AUTO: 11.1 %
NEUTROPHILS # BLD AUTO: 9.43 X10 (3) UL (ref 1.5–7.7)
NEUTROPHILS # BLD AUTO: 9.43 X10(3) UL (ref 1.5–7.7)
NEUTROPHILS NFR BLD AUTO: 67.7 %
OSMOLALITY SERPL CALC.SUM OF ELEC: 284 MOSM/KG (ref 275–295)
PLATELET # BLD AUTO: 188 10(3)UL (ref 150–450)
POTASSIUM SERPL-SCNC: 4.2 MMOL/L (ref 3.5–5.1)
POTASSIUM SERPL-SCNC: 4.2 MMOL/L (ref 3.5–5.1)
PROT SERPL-MCNC: 6.5 G/DL (ref 5.7–8.2)
RBC # BLD AUTO: 4.1 X10(6)UL
SODIUM SERPL-SCNC: 137 MMOL/L (ref 136–145)
WBC # BLD AUTO: 13.9 X10(3) UL (ref 4–11)

## 2024-11-19 PROCEDURE — 99239 HOSP IP/OBS DSCHRG MGMT >30: CPT | Performed by: INTERNAL MEDICINE

## 2024-11-19 RX ORDER — TRAMADOL HYDROCHLORIDE 50 MG/1
50 TABLET ORAL EVERY 8 HOURS PRN
Qty: 20 TABLET | Refills: 0 | Status: SHIPPED | OUTPATIENT
Start: 2024-11-19

## 2024-11-19 RX ORDER — PANTOPRAZOLE SODIUM 40 MG/1
40 TABLET, DELAYED RELEASE ORAL
Qty: 60 TABLET | Refills: 1 | Status: SHIPPED | OUTPATIENT
Start: 2024-11-19

## 2024-11-19 RX ORDER — TRAMADOL HYDROCHLORIDE 50 MG/1
50 TABLET ORAL EVERY 8 HOURS PRN
Qty: 20 TABLET | Refills: 0 | Status: SHIPPED | OUTPATIENT
Start: 2024-11-19 | End: 2024-11-19

## 2024-11-19 NOTE — DISCHARGE SUMMARY
[facility]     Mar Hernandez Patient Status:  Inpatient    1940 MRN [id number]   Location [facility] 3NW-A Attending Ambrose Chatterjee,    Hosp Day # 4 PCP Dorothy Owens MD     Date of Admission: 11/15/2024  Date of Discharge: 2024  Discharge Disposition: SNF Subacute Rehab    Discharge Diagnosis:   #Acute gangrenous cholecystitis complicated by bile leak  #Sepsis due to above, resolved  #Leukocytosis, stable  #Duodenal ulcers  #Severe dementia with behavioral disturbances  #Anxiety  #Hypothyroidism  #Hypertension  #Hyperlipidemia  #PE in 2024, likely provoked after hip surgery    History of Present Illness: Mar Hernandez is a 84 year old female with PMHx severe dementia, anxiety, hypothyroidism, hypertension, hyperlipidemia and PE on eliquis who presents from [facility] with elevated blood pressure.  Patient is a poor historian due to severe dementia and her son and daughter-in-law are at bedside.  They do report she complained of abdominal soreness.  No reported fever, chills, nausea, vomiting, diarrhea or shortness of breath.  At the nursing home, blood pressure was noted to be elevated this morning and when EMS arrived it was 182/112.  She was given her home lisinopril along with as needed hydralazine prior to arrival.  In the ER, blood pressure is around 170 systolic.  Labs show lactic acid 3.78 WBC 23.9.  CT abdomen/pelvis concerning for acute cholecystitis.  She was given sepsis bolus and Zosyn and subsequently admitted with general surgery on consult.     Brief Synopsis: Patient found to have acute gangrenous cholecystitis and underwent laparoscopic cholecystectomy by general surgery.  She met septic criteria on arrival and received a sepsis bolus in the ER.  Lactic acid trended down.  She was started on Zosyn and plan is for 7 days total of antibiotics.  Course complicated by bile leak confirmed on HIDA scan.  GI consulted and she underwent ERCP  without evidence of leak the biliary stent was placed.  Diet was advanced as tolerated.  IntraOp cultures negative to date.  ERCP also showed duodenal ulcers for which she was started on PPI twice daily.  Discussed with son at bedside regarding Eliquis given poor functional status and advanced dementia.  She was started on it in February 2024 after hip surgery so this was likely provoked.  At this point she has completed 6 months anticoagulation.  Regardless, son wanted to stop Eliquis going forward.  Patient was discharged in stable condition back to SNF.    Lace+ Score: 62  59-90 High Risk  29-58 Medium Risk  0-28   Low Risk       TCM Follow-Up Recommendation:  LACE > 58: High Risk of readmission after discharge from the hospital.    Procedures during hospitalization:   Laparoscopic cholecystectomy  ERCP with biliary stent placement    Incidental or significant findings and recommendations (brief descriptions):  Bile leak    Lab/Test results pending at Discharge:   Final IntraOp cultures    Consultants:  General surgery  GI    Discharge Medication List:     Discharge Medications        START taking these medications        Instructions Prescription details   amoxicillin clavulanate 875-125 MG Tabs  Commonly known as: Augmentin      Take 1 tablet by mouth 2 (two) times daily for 3 days.   Stop taking on: November 22, 2024  Quantity: 6 tablet  Refills: 0     pantoprazole 40 MG Tbec  Commonly known as: Protonix      Take 1 tablet (40 mg total) by mouth 2 (two) times daily before meals.   Quantity: 60 tablet  Refills: 1     traMADol 50 MG Tabs  Commonly known as: Ultram      Take 1 tablet (50 mg total) by mouth every 8 (eight) hours as needed for Pain.   Quantity: 20 tablet  Refills: 0            CONTINUE taking these medications        Instructions Prescription details   acetaminophen 500 MG Tabs  Commonly known as: Tylenol Extra Strength      Take 1 tablet (500 mg total) by mouth 2 (two) times daily.   Refills: 0      acetaminophen 325 MG Tabs  Commonly known as: Tylenol      Take 2 tablets (650 mg total) by mouth every 6 (six) hours as needed for Pain or Fever.   Refills: 0     atorvastatin 10 MG Tabs  Commonly known as: Lipitor      Take 1 tablet (10 mg total) by mouth at bedtime.   Refills: 0     benzonatate 100 MG Caps  Commonly known as: Tessalon      Take 1 capsule (100 mg total) by mouth every 8 (eight) hours as needed for cough.   Refills: 0     bisacodyl 5 MG Tbec  Commonly known as: Dulcolax      Take 2 tablets (10 mg total) by mouth every 12 (twelve) hours as needed for constipation.   Refills: 0     Calcium + Vitamin D3 600-10 MG-MCG Tabs  Generic drug: Calcium Carb-Cholecalciferol      TAKE 1 TAB BY MOUTH TWICE DAILY   Quantity: 90 tablet  Refills: 3     cyanocobalamin 250 MCG Tabs  Commonly known as: Vitamin B12      TAKE 1 TAB BY MOUTH DAILY   Quantity: 90 tablet  Refills: 3     donepezil 10 MG Tabs  Commonly known as: Aricept      Take 1 tablet (10 mg total) by mouth daily.   Quantity: 90 tablet  Refills: 3     fexofenadine 180 MG Tabs  Commonly known as: Allegra      Take 1 tablet (180 mg total) by mouth Q24H PRN (for sinus headaches, post nasal drip).   Quantity: 30 tablet  Refills: 0     hydrALAZINE 10 MG Tabs  Commonly known as: Apresoline      Take 1 tablet (10 mg total) by mouth every 6 (six) hours as needed (for SBP > 160).   Refills: 0     ipratropium 0.06 % Soln  Commonly known as: Atrovent      1 spray by Nasal route 4 (four) times daily.   Quantity: 1 each  Refills: 0     levothyroxine 75 MCG Tabs  Commonly known as: Synthroid      TAKE 1 TAB BY MOUTH EVERY MORNING BEFORE BREAKFAST   Quantity: 90 tablet  Refills: 0     lisinopril 5 MG Tabs  Commonly known as: Prinivil; Zestril      Take 1 tablet (5 mg total) by mouth daily.   Refills: 0     memantine 10 MG Tabs  Commonly known as: Namenda      TAKE 1 TAB BY MOUTH TWICE DAILY   Quantity: 90 tablet  Refills: 3     polyethylene glycol (PEG 3350) 17 g  Pack  Commonly known as: Miralax      Take 17 g by mouth daily.   Refills: 0     PreserVision AREDS Tabs      Take 1 capsule by mouth 2 (two) times daily.   Quantity: 180 tablet  Refills: 3     QUEtiapine 50 MG Tabs  Commonly known as: SEROquel      Take 1 tablet (50 mg total) by mouth at bedtime.   Refills: 0     senna-docusate 8.6-50 MG Tabs  Commonly known as: Senokot-S      Take 1 tablet by mouth 2 (two) times daily.   Refills: 0     traZODone 25 mg Tabs  Commonly known as: Desyrel      Take 1 Partial Tablet (25 mg total) by mouth at bedtime.   Refills: 0            STOP taking these medications      Eliquis 2.5 MG Tabs  Generic drug: apixaban                  Where to Get Your Medications        Please  your prescriptions at the location directed by your doctor or nurse    Bring a paper prescription for each of these medications  amoxicillin clavulanate 875-125 MG Tabs  pantoprazole 40 MG Tbec  traMADol 50 MG Tabs         [place]    Follow-up appointment:   Dorothy Owens MD  [address] [facility] [id number] [phone]    Follow up in 1 week(s)      [facility]  [address] [id number] [phone]    Schedule an appointment as soon as possible for a visit in 1 week(s)  For drain removal    Sumaya Garcia MD  [address] [facility] [id number] [phone]    Follow up  As needed    Appointments for Next 30 Days 11/19/2024 - 12/19/2024      None            Vital signs:  Temp:  [98.1 °F (36.7 °C)-99.1 °F (37.3 °C)] 99.1 °F (37.3 °C)  Pulse:  [70-91] 91  Resp:  [17-24] 17  BP: (108-168)/(57-90) 168/85  SpO2:  [92 %-96 %] 92 %    Physical Exam:    See progress note dated same day.  -----------------------------------------------------------------------------------------------  PATIENT DISCHARGE INSTRUCTIONS: See electronic chart    Ambrose Chatterjee DO    Time spent:  33 minutes

## 2024-11-19 NOTE — CM/SW NOTE
Pt medically cleared for DC today. The [facility] can accept pt for return at any time. [facility] scheduled for 5pm as requested by RN. PCS form completed and available for RN. Pt and family at bedside updated.       RN to call report to RN at [facility] [phone]       CM/SW will remain available for DC planning and/or support.     DEVAN SousaN, CMSRN    [id number]

## 2024-11-19 NOTE — PROGRESS NOTES
NURSING DISCHARGE NOTE    Discharged Nursing home via Ambulance.  Accompanied by Support staff  Belongings Taken by patient/family.      IV dc'd w/ catheter intact. Report given to Roberto ROBLES at [facility] [place].

## 2024-11-19 NOTE — PROGRESS NOTES
[facility]   part of [facility]     Hospitalist Progress Note     Mar Hernandez Patient Status:  Inpatient    1940 MRN [id number]   Location [facility] 3NW-A Attending Ambrose Chatterjee,    Hosp Day # 4 PCP Dorothy Owens MD     Chief Complaint: Elevated BP, abdominal pain    Subjective:     Son at bedside who reports patient is doing well. Tolerating liquid diet.     Objective:    Review of Systems:   A comprehensive review of systems was completed; pertinent positive and negatives stated in subjective.    Vital signs:  Temp:  [96.6 °F (35.9 °C)-98.9 °F (37.2 °C)] 98.1 °F (36.7 °C)  Pulse:  [70-91] 79  Resp:  [14-24] 18  BP: (108-167)/(57-89) 141/89  SpO2:  [92 %-98 %] 96 %    Physical Exam:    General: No acute distress, alert but with baseline dementia  Respiratory: No rhonchi, no wheezes  Cardiovascular: S1, S2. Regular rate and rhythm  Abdomen: Soft, non-distended, mild RUQ tenderness. NEMO drain in place  Extremities: No edema    Diagnostic Data:    Labs:  Recent Labs   Lab 11/15/24  1010 24  0558 24  0546 24  0522 24  0600   WBC 23.9* 19.2* 13.3* 12.6* 13.9*   HGB 15.4 12.8 12.1 11.3* 12.5   MCV 90.7 92.3 94.5 93.6 97.3   .0 154.0 175.0 166.0 188.0     Recent Labs   Lab 24  0546 24  0522 24  0600   * 93 96   BUN 22 17 12   CREATSERUM 0.90 0.74 0.75   CA 9.1 8.9 9.5   ALB 3.3 3.1* 3.3    139 137   K 3.6 3.4* 4.2  4.2    112 113*   CO2 25.0 22.0 22.0   ALKPHO 73 70 88   AST 14 12 25   ALT 13 8* 12   BILT 0.5 0.5 0.5   TP 5.7 5.3* 6.5     Estimated Creatinine Clearance: 56.2 mL/min (based on SCr of 0.75 mg/dL).    Recent Labs   Lab 11/15/24  1010   TROPHS 7     No results for input(s): \"PTP\", \"INR\" in the last 168 hours.     Microbiology  Hospital Encounter on 11/15/24   1. Anaerobic Culture     Status: None (Preliminary result)    Collection Time: 11/15/24  7:05 PM    Specimen: Gallbladder; Body fluid, unspecified   Result  Value Ref Range    Anaerobic Culture No Anaerobes to date N/A   2. Aerobic Bacterial Culture     Status: None    Collection Time: 11/15/24  7:05 PM    Specimen: Gallbladder; Body fluid, unspecified   Result Value Ref Range    Aerobic Culture Result No Growth 3 Days N/A    Aerobic Smear No WBCs seen N/A    Aerobic Smear No organisms seen N/A   3. Blood Culture     Status: None (Preliminary result)    Collection Time: 11/15/24 12:19 PM    Specimen: Blood,peripheral   Result Value Ref Range    Blood Culture Result No Growth 3 Days N/A     Imaging: Reviewed in Epic.    Medications:    pantoprazole  40 mg Oral BID AC    enoxaparin  40 mg Subcutaneous Daily    multivitamin  1 tablet Oral Daily    atorvastatin  10 mg Oral Nightly    donepezil  10 mg Oral Daily    ipratropium  1 spray Nasal QID    levothyroxine  75 mcg Oral Before breakfast    lisinopril  5 mg Oral Daily    memantine  10 mg Oral BID    QUEtiapine  50 mg Oral Nightly    traZODone  25 mg Oral Nightly    piperacillin-tazobactam  3.375 g Intravenous Q8H    acetaminophen  1,000 mg Intravenous Q6H    traMADol  50 mg Oral 4 times per day       Assessment & Plan:      #Acute gangrenous cholecystitis complicated by bile leak  -S/p laparoscopic cholecystectomy by general surgery on 11/15  -Pain control, PRN-antiemetics  -HIDA scan with bile leak, GI consulted and s/p ERCP w/o evidence of leak, biliary stent placed  -ADAT  -Continue zosyn, follow blood and intra-op cultures. NGTD  -General surgery consulted    #Sepsis due to above  #Lactic acidosis  -S/p sepsis bolus in ER  -Lactic acid trended down  -Follow cultures, NGTD  -Zosyn, plan for 7 day course of abx     #Leukocytosis due to above  -Monitor, stable    #Duodenal ulcers  -PPI BID     #Severe dementia with behavioral disturbances  #Anxiety  -Patient was in memory care since 2020 but transferred to skilled nursing after hip fracture surgery in January 2024  -Resume home donezepil, memantine, trazodone and  Seroquel     #Hypothyroidism  -Levothyroxine     #Hypertension  -Continue home lisinopril  -PRN hydralazine and labetalol ordered     #Hyperlipidemia  -Statin     #PE in Feb 2024, likely provoked after hip surgery  -Patient has completed >6 month anticoagulation after hip surgery. Stop Eliquis going forward. Son also agrees. We discussed risk/benefits and he wishes to stop  -Lovenox for DVT ppx      Ambrose Chatterjee, DO    Supplementary Documentation:     Quality:  DVT Mechanical Prophylaxis:   SCDs, Early ambuation  DVT Pharmacologic Prophylaxis   Medication    enoxaparin (Lovenox) 40 MG/0.4ML SUBQ injection 40 mg     Code Status: DNAR/Selective Treatment  Ott: External urinary catheter in place  DELICIA: 0-1 day    Discharge is dependent on: Clinical state, consultant recs  At this point Ms. Hernandez is expected to be discharge to: [facility]    The 21st Century Cures Act makes medical notes like these available to patients in the interest of transparency. Please be advised this is a medical document. Medical documents are intended to carry relevant information, facts as evident, and the clinical opinion of the practitioner. The medical note is intended as peer to peer communication and may appear blunt or direct. It is written in medical language and may contain abbreviations or verbiage that are unfamiliar.

## 2024-11-19 NOTE — PLAN OF CARE
A&Ox1. VSS. 2L O2 at night . Denies chest pain and SOB.   Telemetry: NSR.   GI: Abdomen soft, nondistended. Passing gas.   Denies nausea.   : Incontinent/ purewick in place  Denies pain during shift   Up with standby assist/ stand and pivot  Drains: Rt NEMO drain to bulb suction   Incisions: 5 lap sites with skinglue (C/D/I)  Diet: Full liquid  IVF running per order.  IV abx given   All appropriate safety measures in place. All questions and concerns addressed.

## 2024-11-19 NOTE — PROGRESS NOTES
A&Ox1. VSS. RA- 2L NC. .  Telemetry: NSR  GI: Abdomen soft, nondistended. Passing gas.  Denies nausea.  : Incontinent- purewick in place   Pain controlled with PRN pain medications  Up with 2 assist and stand pivot   Drains: R NEMO drain to bulb suction- C/D/I   Incisions: 5 lap sites w/ skin glue- C/D/I  Diet: Tolerating full liquid diet- poor appetite   IVF running per order.  All appropriate safety measures in place. All questions and concerns addressed.

## 2024-11-19 NOTE — PROGRESS NOTES
[facility]  GASTROENTEROLOGY PROGRESS NOTE   [facility]     Mar Hernandez Patient Status:  Inpatient    1940 MRN [id number]   Location [facility] 3NW-A Attending Ambrose Chatterjee,    Hosp Day # 4 PCP Dorothy Owens MD       Reason for Consultation:   Bile leak s/p ERCP    Subjective:      Denies any abd pain.   No n/v.     Patient Active Problem List   Diagnosis    Primary hypertension    Acquired hypothyroidism    Intermediate stage nonexudative age-related macular degeneration of both eyes    Severe dementia with agitation (HCC)    Physical deconditioning    Closed fracture of left hip, initial encounter ([facility])    Nausea and vomiting    Atelectasis    Hypoxia    Acute hypoxemic respiratory failure (HCC)    Pulmonary embolus ([facility])    Acute cholecystitis    Sepsis, due to unspecified organism, unspecified whether acute organ dysfunction present ([facility])       PMH, PSH, Fhx, Shx as per initial consult note    Review of Systems    12 point Review of Systems negative unless otherwise mentioned in Subjective.     Physical Exam  /89 (BP Location: Left arm)   Pulse 79   Temp 98.1 °F (36.7 °C) (Oral)   Resp 18   Ht 5' 7\" (1.702 m)   Wt 170 lb (77.1 kg)   SpO2 96%   BMI 26.63 kg/m²   Body mass index is 26.63 kg/m².      Gen: No acute distress  Resp: no respiratory distress  Abd: Soft, non-tender, non-distended. No rebound tenderness, no guarding.   Neuro: Aox3.     Diagnostic Data:      Labs:  Recent Labs   Lab 24  0546 24  0522 24  0600   WBC 13.3* 12.6* 13.9*   HGB 12.1 11.3* 12.5   MCV 94.5 93.6 97.3   .0 166.0 188.0       Recent Labs   Lab 24  0546 24  0522 24  0600   * 93 96   BUN 22 17 12   CREATSERUM 0.90 0.74 0.75   CA 9.1 8.9 9.5   ALB 3.3 3.1* 3.3    139 137   K 3.6 3.4* 4.2  4.2    112 113*   CO2 25.0 22.0 22.0   ALKPHO 73 70 88   AST 14 12 25   ALT 13 8* 12   BILT 0.5 0.5 0.5   TP 5.7 5.3* 6.5       No results  for input(s): \"PTP\", \"INR\" in the last 168 hours.    Blood Output: 50 mL (11/15/2024  9:07 PM)          Imaging: Imaging data reviewed in Epic.    Medications:    pantoprazole  40 mg Oral BID AC    enoxaparin  40 mg Subcutaneous Daily    multivitamin  1 tablet Oral Daily    atorvastatin  10 mg Oral Nightly    donepezil  10 mg Oral Daily    ipratropium  1 spray Nasal QID    levothyroxine  75 mcg Oral Before breakfast    lisinopril  5 mg Oral Daily    memantine  10 mg Oral BID    QUEtiapine  50 mg Oral Nightly    traZODone  25 mg Oral Nightly    piperacillin-tazobactam  3.375 g Intravenous Q8H    acetaminophen  1,000 mg Intravenous Q6H    traMADol  50 mg Oral 4 times per day       Allergies:  Allergies[1]    Imaging:  I have personally reviewed all pertinent available imaging.       ASSESSMENT / PLAN:     Mar Hernandez is a 84 year old female with GI consult for bile leak.  11/18/24, yest, ERCP w/ sphincterotomy and biliary stent. Plastic.   Duodenal ulcerations  Exam benign; LFTs wnl      Recommendations:   Full liquid diet, ADAT  IVF, analgesia, anti-emetics per primary team  Repeat ERCP in 8 weeks for stent removal   PPI BID x 8 weeks;   Continue abx per primary       GI will signoff, please page with any questions     Michele García MD  [facility]               [1] No Known Allergies

## 2024-11-19 NOTE — DISCHARGE INSTRUCTIONS
Cholecystectomy  Dr. Sumaya Garcia    MEDICATIONS  For post-operative pain control, the medications are usually tramadol.  This is a narcotic and is best taken by starting with one tablet and repeating every four to six hours as needed.  If the patient does not feel they need the narcotics they shouldn’t take them.  If the pain is severe the patient may take up to two pills every six hours.  The patient can take tylenol 1g three times a day and ibuprofen 400-600mg in between up to 4 times a day as needed for pain as well.  The patient can take zofran 4mg every 6 hours as needed for nausea. The patient can also take miralax or colace as needed for constipation. Please ask your surgeon before resuming blood thinners such as aspirin, Plavix or Coumadin.  All other home medications may be resumed as scheduled.  With severe cholecystitis, antibiotics will also be prescribed.  With antibiotics, please watch for rash, facial swelling or severe diarrhea.    DIET  The patient may resume a general diet immediately.  This is not a good time to eat excessively.  The patient should eat in moderation and stick with foods the patient feels are easy to digest.  Avoid high fat foods in the immediate post-operative time period as this may still cause some problems.  The patient may eat anything in small amounts but foods rich in dairy products, fatty foods or fried foods may cause an upset stomach for up to six weeks after surgery.  There should be no alcohol consumption in the immediate recovery time period or within six hours of taking narcotics.    WOUND CARE  The dressing is usually a dissolvable glue which protects the wound. The patient can take a shower starting the day after surgery, but please, no baths or soaking. Please do not put any creams or ointments on the surgical incisions. If the patient does have a top dressing with clear tape and gauze, this dressing may be removed in 2 days. Do not remove the steri-strips or  butterfly tapes that are white and adherent to the skin.  The steri-strips will eventually peel up at the ends and at this point they may be removed.  This is usually seven to ten days after surgery.  When showering, soap can get on the wounds but do not scrub over the wounds.  No hair dye or chemicals of any kind should get in the wounds.  Avoid tub baths, swimming or sitting in a hot tub for two weeks.  If visible sutures or staples are present they will be removed in the office by the surgeon or nurse.  Most wounds will be closed with dissolving suture underneath the skin.  These sutures will dissolve on their own.  If a drain is present make sure the patient receives drain care instructions from the nurse prior to discharge.  Most patients will not have a drain.    ACTIVITY  Every day the patient should be up, showered and dressed.  Each day the patient should be up and around the house.  The patient should not lie in bed and should not stay in pajamas.  We count on the patient being up, coughing, walking and deep breathing to avoid pneumonia and blood clots in the legs.  Once a day the patient should get out of the house and go shopping, go to the mall, the [employer] store, the [employer] or a restaurant.  The patient may ride in a car but should not drive the car for at least one week.  Patients should be off narcotics for at least 8 hours prior to being a .  The average time off work is 10 to 14 days; most adults will be seeing the surgeon prior to returning to work.  Students will return to school within 1-5 days after discharge from the hospital but will be off gym, sports, and indoors for recess for one month.  Patients may go up and down stairs and lift up to five pounds but no bending, pushing or pulling.  Nothing called work or exercise until the follow up visit.  No ‘stair-master’, power walking, jogging or workouts until the follow up visit.  Patients should seek further activity limits at the time  of their appointment.    APPOINTMENT  Please call our office for an appointment within five to ten days of discharge.  Any fever greater than 100.5, chills, nausea, vomiting, or severe diarrhea please call our office.  If the wound turns red, hot, swollen, becomes increasingly painful, or drains pus call us immediately at [phone].  For life threatening emergencies call 911.  For non-emergent care please call our office after 8:30 a.m. Monday through Friday.  The number listed above is our office number; our phone automatically switches to our answering service if we are not there.  Please do not use the emergency room for non-urgent care.    Thank you for entrusting us with your care.  [facility]--General Surgery

## 2024-11-19 NOTE — PROGRESS NOTES
[facility]  General Surgery Progress Note    Mar Hernandez Patient Status:  Inpatient    1940 MRN [id number]   Location [facility] 3NW-A Attending Ambrose Chatterjee,    Hosp Day # 4 PCP Dorothy Owens MD     Subjective:  Patient had ERCP yesterday.  No discernible leak but stent left behind.  No other issues overnight.    Objective/Physical Exam:      Intake/Output Summary (Last 24 hours) at 2024 1254  Last data filed at 2024 1239  Gross per 24 hour   Intake 750 ml   Output 690 ml   Net 60 ml       Vital Signs:  Blood pressure 157/90, pulse 87, temperature 98.6 °F (37 °C), temperature source Oral, resp. rate 17, height 67\", weight 170 lb (77.1 kg), SpO2 93%.    General: Alert, orientated x3.  Cooperative.  No apparent distress.  HEENT: Exam is unremarkable.  Without scleral icterus.  Mucous membranes are moist. Oropharynx is clear.  Neck: No JVD. Supple.   Lungs: Non labored breathing, equal chest rise  Cardiac: Regular rate and rhythm. No murmur.  Abdomen:  Soft, non-distended, non-tender, with no rebound or guarding.  No peritoneal signs.  Surgical incisions healing well, no erythema or drainage.  NEMO drain with more serosanguineous output today  Extremities:  No lower extremity edema noted.  Without clubbing or cyanosis.  2+ pulses x4, motor and sensation grossly intact      Labs:  Lab Results   Component Value Date    WBC 13.9 2024    RBC 4.10 2024    HGB 12.5 2024    HCT 39.9 2024    MCV 97.3 2024    MCH 30.5 2024    MCHC 31.3 2024    RDW 13.0 2024    .0 2024     Lab Results   Component Value Date     2024    K 4.2 2024    K 4.2 2024     2024    CO2 22.0 2024    BUN 12 2024    CREATSERUM 0.75 2024    GLU 96 2024    CA 9.5 2024    ALKPHO 88 2024    ALT 12 2024    AST 25 2024    BILT 0.5 2024    ALB 3.3 2024    TP 6.5 2024           Images:  XR ENDO BILE DUCT (CPT=[id number])    Result Date: 11/18/2024  CONCLUSION:  Opacification of common bile duct and intrahepatic biliary ducts.  Placement of stent is noted.   LOCATION:  [facility]   Dictated by (CST): Cholo Dickey MD on 11/18/2024 at 6:30 PM     Finalized by (CST): Cholo Dickey MD on 11/18/2024 at 6:31 PM        Assessment/Plan:  Patient Active Problem List   Diagnosis    Primary hypertension    Acquired hypothyroidism    Intermediate stage nonexudative age-related macular degeneration of both eyes    Severe dementia with agitation (HCC)    Physical deconditioning    Closed fracture of left hip, initial encounter ([facility])    Nausea and vomiting    Atelectasis    Hypoxia    Acute hypoxemic respiratory failure ([facility])    Pulmonary embolus ([facility])    Acute cholecystitis    Sepsis, due to unspecified organism, unspecified whether acute organ dysfunction present ([facility])       84 year old female with acute cholecystitis status post laparoscopic cholecystectomy with intraoperative cholangiogram, now with bile leak status post ERCP and stent placement    No acute surgical intervention  On physical exam, NEMO drain with more serosanguineous output  No leak was  identified on ERCP  Continue diet as tolerated, advance to soft  Pain control as needed  Encourage mobilization as tolerated  Continue to monitor drain output until patient has been on solid food  She will more than likely need to go home with drain in place and have it pulled in clinic  Surgery will continue to follow while in house  Rest of care per primary    Sumaya Garcia MD  [facility] General Surgery  11/19/2024  12:54 PM

## 2024-11-25 ENCOUNTER — TELEPHONE (OUTPATIENT)
Facility: LOCATION | Age: 84
End: 2024-11-25

## 2024-11-25 NOTE — TELEPHONE ENCOUNTER
Shelli Khan from [facility] is calling because the patient pulled out her NEMO drain. With her appointment on Friday, they want to know if she should be seen sooner or if there's anything for them to do before then.    Please advise  Best callback number is [phone]     Future Appointments   Date Time Provider Department Center   11/29/2024 11:15 AM EM[facility] GEN SURG [facility] [id number]

## 2024-11-27 ENCOUNTER — TELEPHONE (OUTPATIENT)
Dept: RHEUMATOLOGY | Facility: CLINIC | Age: 84
End: 2024-11-27

## 2024-11-27 NOTE — TELEPHONE ENCOUNTER
[facility] with Nona at [place] [facility] letting her know that the patient needs to come in for follow up appointment.  Per Nona the facility will not have transportation on current appointment date.  Appointment rescheduled.  Verbalized understanding.    Future Appointments   Date Time Provider Department Center   12/3/2024  2:00 PM EMG GEN SURG [facility] [id number]

## 2024-11-27 NOTE — TELEPHONE ENCOUNTER
Nona with [facility] at [place] calling regarding patients appointment on 11/29/24.  Nona advised patient pulled her drain out this past Monday and would like to know if she still has to come in for the appointment.    Call back number is [phone]

## 2024-12-02 ENCOUNTER — LAB REQUISITION (OUTPATIENT)
Dept: LAB | Facility: HOSPITAL | Age: 84
End: 2024-12-02
Payer: MEDICARE

## 2024-12-02 DIAGNOSIS — Z48.815 ENCOUNTER FOR SURGICAL AFTERCARE FOLLOWING SURGERY ON THE DIGESTIVE SYSTEM: ICD-10-CM

## 2024-12-02 DIAGNOSIS — K81.2 ACUTE CHOLECYSTITIS WITH CHRONIC CHOLECYSTITIS: ICD-10-CM

## 2024-12-02 LAB
ALBUMIN SERPL-MCNC: 3.2 G/DL (ref 3.2–4.8)
ALBUMIN/GLOB SERPL: 1.3 {RATIO} (ref 1–2)
ALP LIVER SERPL-CCNC: 99 U/L
ALT SERPL-CCNC: 24 U/L
ANION GAP SERPL CALC-SCNC: 8 MMOL/L (ref 0–18)
AST SERPL-CCNC: 20 U/L (ref ?–34)
BASOPHILS # BLD AUTO: 0.06 X10(3) UL (ref 0–0.2)
BASOPHILS NFR BLD AUTO: 0.9 %
BILIRUB SERPL-MCNC: 0.3 MG/DL (ref 0.2–1.1)
BUN BLD-MCNC: 14 MG/DL (ref 9–23)
CALCIUM BLD-MCNC: 9.4 MG/DL (ref 8.7–10.4)
CHLORIDE SERPL-SCNC: 111 MMOL/L (ref 98–112)
CO2 SERPL-SCNC: 25 MMOL/L (ref 21–32)
CREAT BLD-MCNC: 0.86 MG/DL
EGFRCR SERPLBLD CKD-EPI 2021: 67 ML/MIN/1.73M2 (ref 60–?)
EOSINOPHIL # BLD AUTO: 0.31 X10(3) UL (ref 0–0.7)
EOSINOPHIL NFR BLD AUTO: 4.4 %
ERYTHROCYTE [DISTWIDTH] IN BLOOD BY AUTOMATED COUNT: 13.2 %
FASTING STATUS PATIENT QL REPORTED: YES
GLOBULIN PLAS-MCNC: 2.5 G/DL (ref 2–3.5)
GLUCOSE BLD-MCNC: 82 MG/DL (ref 70–99)
HCT VFR BLD AUTO: 35.7 %
HGB BLD-MCNC: 11.4 G/DL
IMM GRANULOCYTES # BLD AUTO: 0.02 X10(3) UL (ref 0–1)
IMM GRANULOCYTES NFR BLD: 0.3 %
LYMPHOCYTES # BLD AUTO: 2.6 X10(3) UL (ref 1–4)
LYMPHOCYTES NFR BLD AUTO: 37.1 %
MCH RBC QN AUTO: 30.2 PG (ref 26–34)
MCHC RBC AUTO-ENTMCNC: 31.9 G/DL (ref 31–37)
MCV RBC AUTO: 94.7 FL
MONOCYTES # BLD AUTO: 0.69 X10(3) UL (ref 0.1–1)
MONOCYTES NFR BLD AUTO: 9.8 %
NEUTROPHILS # BLD AUTO: 3.33 X10 (3) UL (ref 1.5–7.7)
NEUTROPHILS # BLD AUTO: 3.33 X10(3) UL (ref 1.5–7.7)
NEUTROPHILS NFR BLD AUTO: 47.5 %
OSMOLALITY SERPL CALC.SUM OF ELEC: 298 MOSM/KG (ref 275–295)
PLATELET # BLD AUTO: 353 10(3)UL (ref 150–450)
POTASSIUM SERPL-SCNC: 3.6 MMOL/L (ref 3.5–5.1)
PROT SERPL-MCNC: 5.7 G/DL (ref 5.7–8.2)
RBC # BLD AUTO: 3.77 X10(6)UL
SODIUM SERPL-SCNC: 144 MMOL/L (ref 136–145)
WBC # BLD AUTO: 7 X10(3) UL (ref 4–11)

## 2024-12-02 PROCEDURE — 85025 COMPLETE CBC W/AUTO DIFF WBC: CPT | Performed by: HOSPITALIST

## 2024-12-02 PROCEDURE — 80053 COMPREHEN METABOLIC PANEL: CPT | Performed by: HOSPITALIST

## 2024-12-03 ENCOUNTER — OFFICE VISIT (OUTPATIENT)
Facility: LOCATION | Age: 84
End: 2024-12-03
Payer: MEDICARE

## 2024-12-03 VITALS
OXYGEN SATURATION: 96 % | SYSTOLIC BLOOD PRESSURE: 134 MMHG | TEMPERATURE: 98 F | RESPIRATION RATE: 20 BRPM | DIASTOLIC BLOOD PRESSURE: 78 MMHG | HEART RATE: 80 BPM

## 2024-12-03 DIAGNOSIS — Z98.890 POST-OPERATIVE STATE: Primary | ICD-10-CM

## 2024-12-03 PROCEDURE — 99024 POSTOP FOLLOW-UP VISIT: CPT

## 2024-12-03 NOTE — PROGRESS NOTES
Post Operative Visit Note       Active Problems  1. Post-operative state         Chief Complaint   Chief Complaint   Patient presents with    Post-Op     PO- 11/15 LAPAROSCOPIC CHOLECYSTECTOMY WITH INTRAOPERATIVE CHOLANGIOGRAM w/Jose           History of Present Illness   84 year old female presents to the clinic for follow-up visit s/p laparoscopic cystectomy on 11/15/2024 with Dr. Garcia.    Caregiver Baljit from [facility] was present during visit. Patient is a poor historian secondary to advanced dementia.  Per caregiver, patient has not experienced abdominal pain.  She is tolerating diet without nausea or vomiting.  She denies constipation or diarrhea.  She denies fever and chills.  She denies urinary symptoms.    Patient removed NEMO drain prior to appointment.      Allergies  Mar has No Known Allergies.    Past Medical / Surgical / Social / Family History    The past medical and past surgical history have been reviewed by me today.     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    Family History   Problem Relation Age of Onset    Other (Other) Father         Aortic Aneurysm    Hypertension Brother      Social History     Socioeconomic History    Marital status:    Tobacco Use    Smoking status: Never    Smokeless tobacco: Never   Substance and Sexual Activity    Alcohol use: Not Currently      Alcohol/week: 1.0 standard drink of alcohol     Types: 1 Glasses of wine per week     Comment: 1-2 per week    Drug use: No   Other Topics Concern    Caffeine Concern Yes     Comment: 1-2 per day    Exercise No    Seat Belt Yes        Current Outpatient Medications:     pantoprazole 40 MG Oral Tab EC, Take 1 tablet (40 mg total) by mouth 2 (two) times daily before meals., Disp: 60 tablet, Rfl: 1    traMADol 50 MG Oral Tab, Take 1 tablet (50 mg total) by mouth every 8 (eight) hours as needed for Pain., Disp: 20 tablet, Rfl: 0    acetaminophen 500 MG Oral Tab, Take 1 tablet (500 mg total) by mouth 2 (two) times daily., Disp: , Rfl:     acetaminophen 325 MG Oral Tab, Take 2 tablets (650 mg total) by mouth every 6 (six) hours as needed for Pain or Fever., Disp: , Rfl:     atorvastatin 10 MG Oral Tab, Take 1 tablet (10 mg total) by mouth at bedtime., Disp: , Rfl:     bisacodyl 5 MG Oral Tab EC, Take 2 tablets (10 mg total) by mouth every 12 (twelve) hours as needed for constipation., Disp: , Rfl:     hydrALAZINE 10 MG Oral Tab, Take 1 tablet (10 mg total) by mouth every 6 (six) hours as needed (for SBP > 160)., Disp: , Rfl:     lisinopril 5 MG Oral Tab, Take 1 tablet (5 mg total) by mouth daily., Disp: , Rfl:     polyethylene glycol, PEG 3350, 17 g Oral Powd Pack, Take 17 g by mouth daily., Disp: , Rfl:     senna-docusate 8.6-50 MG Oral Tab, Take 1 tablet by mouth 2 (two) times daily., Disp: , Rfl:     QUEtiapine 50 MG Oral Tab, Take 1 tablet (50 mg total) by mouth at bedtime., Disp: , Rfl:     benzonatate 100 MG Oral Cap, Take 1 capsule (100 mg total) by mouth every 8 (eight) hours as needed for cough., Disp: , Rfl:     traZODone 25 mg Oral Tab, Take 1 Partial Tablet (25 mg total) by mouth at bedtime., Disp: , Rfl:     fexofenadine 180 MG Oral Tab, Take 1 tablet (180 mg total) by mouth Q24H PRN (for sinus headaches, post nasal drip)., Disp: 30 tablet, Rfl: 0    Ipratropium Bromide 0.06 % Nasal Solution, 1 spray by  Nasal route 4 (four) times daily., Disp: 1 each, Rfl: 0    MEMANTINE HCL 10 MG Oral Tab, TAKE 1 TAB BY MOUTH TWICE DAILY, Disp: 90 tablet, Rfl: 3    LEVOTHYROXINE SODIUM 75 MCG Oral Tab, TAKE 1 TAB BY MOUTH EVERY MORNING BEFORE BREAKFAST, Disp: 90 tablet, Rfl: 0    CALCIUM + VITAMIN D3 600-10 MG-MCG Oral Tab, TAKE 1 TAB BY MOUTH TWICE DAILY, Disp: 90 tablet, Rfl: 3    VITAMIN B-12 250 MCG Oral Tab, TAKE 1 TAB BY MOUTH DAILY, Disp: 90 tablet, Rfl: 3    Donepezil HCl 10 MG Oral Tab, Take 1 tablet (10 mg total) by mouth daily., Disp: 90 tablet, Rfl: 3    Multiple Vitamins-Minerals (PRESERVISION AREDS) Oral Tab, Take 1 capsule by mouth 2 (two) times daily., Disp: 180 tablet, Rfl: 3      Review of Systems  A 10 point Review of Systems has been completed by me today and is negative except as above in the HPI.    Physical Findings   /78   Pulse 80   Temp 97.7 °F (36.5 °C) (Temporal)   Resp 20   SpO2 96%   Gen/psych: Alert. Cooperative, no apparent distress  Cardiovascular: regular rate  Respiratory: respirations unlabored, no wheeze  Abdominal: soft, non-tender, non-distended, no guarding/rebound  Incisions: Dermabond present.  Clean, dry, intact.  No erythema or drainage  Drain: Previous NEMO site reveals resolving eschar.  No erythema or drainage.       Assessment/Plan  1. Post-operative state        Continue general diet as tolerated.  Continue no heavy lifting more than 20 lbs until 6 weeks past procedure date (12/27/2024).  Discussed with patient and caregiver to monitor for signs of infection, including significant redness or swelling surrounding the drain site, fever, chills, nausea, vomiting.  Encouraged patient and caregiver to have her follow-up with gastroenterologist regarding stent removal.  Pathology was discussed with the patient and caregiver.  All questions and concerns were addressed.  Follow up as needed.         Imaging & Referrals   None    Follow Up  Return if symptoms worsen or fail to  improve.    BRITTA Chavez  [facility] General Surgery  12/3/2024  2:28 PM

## 2024-12-11 PROCEDURE — 81001 URINALYSIS AUTO W/SCOPE: CPT | Performed by: HOSPITALIST

## 2024-12-12 ENCOUNTER — LAB REQUISITION (OUTPATIENT)
Dept: LAB | Facility: HOSPITAL | Age: 84
End: 2024-12-12
Payer: MEDICARE

## 2024-12-12 ENCOUNTER — HOSPITAL ENCOUNTER (EMERGENCY)
Facility: HOSPITAL | Age: 84
Discharge: HOME | End: 2024-12-12
Attending: EMERGENCY MEDICINE
Payer: MEDICARE

## 2024-12-12 ENCOUNTER — APPOINTMENT (OUTPATIENT)
Dept: GENERAL RADIOLOGY | Facility: HOSPITAL | Age: 84
End: 2024-12-12
Attending: EMERGENCY MEDICINE
Payer: MEDICARE

## 2024-12-12 VITALS
OXYGEN SATURATION: 97 % | RESPIRATION RATE: 14 BRPM | WEIGHT: 169.75 LBS | HEART RATE: 97 BPM | DIASTOLIC BLOOD PRESSURE: 72 MMHG | BODY MASS INDEX: 27 KG/M2 | TEMPERATURE: 97 F | SYSTOLIC BLOOD PRESSURE: 134 MMHG

## 2024-12-12 DIAGNOSIS — M62.81 MUSCLE WEAKNESS (GENERALIZED): ICD-10-CM

## 2024-12-12 DIAGNOSIS — N30.00 ACUTE CYSTITIS WITHOUT HEMATURIA: ICD-10-CM

## 2024-12-12 DIAGNOSIS — E86.0 DEHYDRATION: ICD-10-CM

## 2024-12-12 DIAGNOSIS — D72.829 LEUKOCYTOSIS, UNSPECIFIED TYPE: Primary | ICD-10-CM

## 2024-12-12 LAB
ALBUMIN SERPL-MCNC: 3.5 G/DL (ref 3.2–4.8)
ALBUMIN SERPL-MCNC: 3.8 G/DL (ref 3.2–4.8)
ALBUMIN/GLOB SERPL: 1.1 {RATIO} (ref 1–2)
ALBUMIN/GLOB SERPL: 1.2 {RATIO} (ref 1–2)
ALP LIVER SERPL-CCNC: 115 U/L
ALP LIVER SERPL-CCNC: 123 U/L
ALT SERPL-CCNC: 46 U/L
ALT SERPL-CCNC: 48 U/L
ANION GAP SERPL CALC-SCNC: 11 MMOL/L (ref 0–18)
ANION GAP SERPL CALC-SCNC: 5 MMOL/L (ref 0–18)
AST SERPL-CCNC: 37 U/L (ref ?–34)
AST SERPL-CCNC: 39 U/L (ref ?–34)
BASOPHILS # BLD AUTO: 0.04 X10(3) UL (ref 0–0.2)
BASOPHILS # BLD AUTO: 0.04 X10(3) UL (ref 0–0.2)
BASOPHILS NFR BLD AUTO: 0.2 %
BASOPHILS NFR BLD AUTO: 0.2 %
BILIRUB SERPL-MCNC: 0.7 MG/DL (ref 0.2–1.1)
BILIRUB SERPL-MCNC: 0.7 MG/DL (ref 0.2–1.1)
BILIRUB UR QL STRIP.AUTO: NEGATIVE
BILIRUB UR QL STRIP.AUTO: NEGATIVE
BUN BLD-MCNC: 17 MG/DL (ref 9–23)
BUN BLD-MCNC: 18 MG/DL (ref 9–23)
CALCIUM BLD-MCNC: 10.1 MG/DL (ref 8.7–10.4)
CALCIUM BLD-MCNC: 10.3 MG/DL (ref 8.7–10.4)
CHLORIDE SERPL-SCNC: 106 MMOL/L (ref 98–112)
CHLORIDE SERPL-SCNC: 108 MMOL/L (ref 98–112)
CO2 SERPL-SCNC: 24 MMOL/L (ref 21–32)
CO2 SERPL-SCNC: 26 MMOL/L (ref 21–32)
COLOR UR AUTO: YELLOW
COLOR UR AUTO: YELLOW
CREAT BLD-MCNC: 0.92 MG/DL
CREAT BLD-MCNC: 1 MG/DL
EGFRCR SERPLBLD CKD-EPI 2021: 56 ML/MIN/1.73M2 (ref 60–?)
EGFRCR SERPLBLD CKD-EPI 2021: 62 ML/MIN/1.73M2 (ref 60–?)
EOSINOPHIL # BLD AUTO: 0.05 X10(3) UL (ref 0–0.7)
EOSINOPHIL # BLD AUTO: 0.09 X10(3) UL (ref 0–0.7)
EOSINOPHIL NFR BLD AUTO: 0.3 %
EOSINOPHIL NFR BLD AUTO: 0.5 %
ERYTHROCYTE [DISTWIDTH] IN BLOOD BY AUTOMATED COUNT: 13.3 %
ERYTHROCYTE [DISTWIDTH] IN BLOOD BY AUTOMATED COUNT: 13.5 %
FASTING STATUS PATIENT QL REPORTED: YES
GLOBULIN PLAS-MCNC: 3.1 G/DL (ref 2–3.5)
GLOBULIN PLAS-MCNC: 3.3 G/DL (ref 2–3.5)
GLUCOSE BLD-MCNC: 117 MG/DL (ref 70–99)
GLUCOSE BLD-MCNC: 94 MG/DL (ref 70–99)
GLUCOSE UR STRIP.AUTO-MCNC: NEGATIVE MG/DL
GLUCOSE UR STRIP.AUTO-MCNC: NORMAL MG/DL
HCT VFR BLD AUTO: 36.9 %
HCT VFR BLD AUTO: 40.2 %
HGB BLD-MCNC: 12.1 G/DL
HGB BLD-MCNC: 12.9 G/DL
HYALINE CASTS #/AREA URNS AUTO: PRESENT /LPF
IMM GRANULOCYTES # BLD AUTO: 0.09 X10(3) UL (ref 0–1)
IMM GRANULOCYTES # BLD AUTO: 0.09 X10(3) UL (ref 0–1)
IMM GRANULOCYTES NFR BLD: 0.5 %
IMM GRANULOCYTES NFR BLD: 0.5 %
LACTATE SERPL-SCNC: 1.5 MMOL/L (ref 0.5–2)
LEUKOCYTE ESTERASE UR QL STRIP.AUTO: NEGATIVE
LYMPHOCYTES # BLD AUTO: 2.44 X10(3) UL (ref 1–4)
LYMPHOCYTES # BLD AUTO: 2.72 X10(3) UL (ref 1–4)
LYMPHOCYTES NFR BLD AUTO: 14.4 %
LYMPHOCYTES NFR BLD AUTO: 14.7 %
MCH RBC QN AUTO: 30.1 PG (ref 26–34)
MCH RBC QN AUTO: 30.6 PG (ref 26–34)
MCHC RBC AUTO-ENTMCNC: 32.1 G/DL (ref 31–37)
MCHC RBC AUTO-ENTMCNC: 32.8 G/DL (ref 31–37)
MCV RBC AUTO: 93.2 FL
MCV RBC AUTO: 93.9 FL
MONOCYTES # BLD AUTO: 1.55 X10(3) UL (ref 0.1–1)
MONOCYTES # BLD AUTO: 1.55 X10(3) UL (ref 0.1–1)
MONOCYTES NFR BLD AUTO: 8.4 %
MONOCYTES NFR BLD AUTO: 9.1 %
NEUTROPHILS # BLD AUTO: 12.8 X10 (3) UL (ref 1.5–7.7)
NEUTROPHILS # BLD AUTO: 12.8 X10(3) UL (ref 1.5–7.7)
NEUTROPHILS # BLD AUTO: 14.04 X10 (3) UL (ref 1.5–7.7)
NEUTROPHILS # BLD AUTO: 14.04 X10(3) UL (ref 1.5–7.7)
NEUTROPHILS NFR BLD AUTO: 75.5 %
NEUTROPHILS NFR BLD AUTO: 75.7 %
NITRITE UR QL STRIP.AUTO: NEGATIVE
NITRITE UR QL STRIP.AUTO: POSITIVE
OSMOLALITY SERPL CALC.SUM OF ELEC: 291 MOSM/KG (ref 275–295)
OSMOLALITY SERPL CALC.SUM OF ELEC: 293 MOSM/KG (ref 275–295)
PH UR STRIP.AUTO: 5.5 [PH] (ref 5–8)
PH UR STRIP.AUTO: 7 [PH] (ref 5–8)
PLATELET # BLD AUTO: 193 10(3)UL (ref 150–450)
PLATELET # BLD AUTO: 204 10(3)UL (ref 150–450)
POTASSIUM SERPL-SCNC: 3.8 MMOL/L (ref 3.5–5.1)
POTASSIUM SERPL-SCNC: 4.2 MMOL/L (ref 3.5–5.1)
PROT SERPL-MCNC: 6.6 G/DL (ref 5.7–8.2)
PROT SERPL-MCNC: 7.1 G/DL (ref 5.7–8.2)
PROT UR STRIP.AUTO-MCNC: 50 MG/DL
RBC # BLD AUTO: 3.96 X10(6)UL
RBC # BLD AUTO: 4.28 X10(6)UL
RBC UR QL AUTO: NEGATIVE
SODIUM SERPL-SCNC: 139 MMOL/L (ref 136–145)
SODIUM SERPL-SCNC: 141 MMOL/L (ref 136–145)
SP GR UR STRIP.AUTO: 1.02 (ref 1–1.03)
SP GR UR STRIP.AUTO: 1.03 (ref 1–1.03)
UROBILINOGEN UR STRIP.AUTO-MCNC: 0.2 MG/DL
UROBILINOGEN UR STRIP.AUTO-MCNC: NORMAL MG/DL
WBC # BLD AUTO: 17 X10(3) UL (ref 4–11)
WBC # BLD AUTO: 18.5 X10(3) UL (ref 4–11)

## 2024-12-12 PROCEDURE — 87186 SC STD MICRODIL/AGAR DIL: CPT | Performed by: EMERGENCY MEDICINE

## 2024-12-12 PROCEDURE — 87040 BLOOD CULTURE FOR BACTERIA: CPT | Performed by: EMERGENCY MEDICINE

## 2024-12-12 PROCEDURE — 87086 URINE CULTURE/COLONY COUNT: CPT | Performed by: EMERGENCY MEDICINE

## 2024-12-12 PROCEDURE — 99285 EMERGENCY DEPT VISIT HI MDM: CPT

## 2024-12-12 PROCEDURE — 36415 COLL VENOUS BLD VENIPUNCTURE: CPT

## 2024-12-12 PROCEDURE — 96361 HYDRATE IV INFUSION ADD-ON: CPT

## 2024-12-12 PROCEDURE — 80053 COMPREHEN METABOLIC PANEL: CPT | Performed by: EMERGENCY MEDICINE

## 2024-12-12 PROCEDURE — 80053 COMPREHEN METABOLIC PANEL: CPT | Performed by: HOSPITALIST

## 2024-12-12 PROCEDURE — 85025 COMPLETE CBC W/AUTO DIFF WBC: CPT | Performed by: EMERGENCY MEDICINE

## 2024-12-12 PROCEDURE — 96365 THER/PROPH/DIAG IV INF INIT: CPT

## 2024-12-12 PROCEDURE — 87077 CULTURE AEROBIC IDENTIFY: CPT | Performed by: EMERGENCY MEDICINE

## 2024-12-12 PROCEDURE — 81001 URINALYSIS AUTO W/SCOPE: CPT | Performed by: EMERGENCY MEDICINE

## 2024-12-12 PROCEDURE — 81015 MICROSCOPIC EXAM OF URINE: CPT | Performed by: EMERGENCY MEDICINE

## 2024-12-12 PROCEDURE — 71045 X-RAY EXAM CHEST 1 VIEW: CPT | Performed by: EMERGENCY MEDICINE

## 2024-12-12 PROCEDURE — 85025 COMPLETE CBC W/AUTO DIFF WBC: CPT | Performed by: HOSPITALIST

## 2024-12-12 PROCEDURE — 83605 ASSAY OF LACTIC ACID: CPT | Performed by: EMERGENCY MEDICINE

## 2024-12-12 RX ORDER — CEPHALEXIN 500 MG/1
500 CAPSULE ORAL 4 TIMES DAILY
Qty: 40 CAPSULE | Refills: 0 | Status: SHIPPED | OUTPATIENT
Start: 2024-12-12 | End: 2024-12-22

## 2024-12-12 NOTE — ED INITIAL ASSESSMENT (HPI)
EMS reports that they were were called to the PT's SNF due to abnormal blood work ( elevated WBC) and vomit x1. [facility] reports that staff was concerned due to a recent abdominal surgery.

## 2024-12-12 NOTE — ED PROVIDER NOTES
Patient Seen in: [facility] Emergency Department      History     Chief Complaint   Patient presents with    Abnormal Labs     Stated Complaint: Elevated WBC, vomit x 1    Subjective:   HPI      84-year-old patient presents to the emergency department from [place].  Apparently her white blood cell count was elevated today and she threw up once last night.  Her son states that this is her baseline she unfortunately has developed some anxiety has a history of dementia sundowning and a head injury.    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    Skin surgery Left 04/27/2016    ED&C of SCCIS to L Upper Arm by SD    Skin surgery Left 04/27/2016    ED&C of SCCIS to L Upper Back by SD                Social History     Socioeconomic History    Marital status:    Tobacco Use    Smoking status: Never    Smokeless tobacco: Never   Substance and Sexual Activity    Alcohol use: Not Currently     Alcohol/week: 1.0 standard drink of alcohol     Types: 1 Glasses of wine per week     Comment: 1-2 per week    Drug use: No   Other Topics Concern    Caffeine Concern Yes     Comment: 1-2 per day    Exercise No    Seat Belt Yes     Social Drivers of Health     Food Insecurity: No Food Insecurity (11/15/2024)    Food Insecurity     Food Insecurity: Never true   Transportation Needs: No Transportation Needs (11/15/2024)    Transportation Needs     Lack of Transportation: No   Housing Stability: Low Risk  (11/15/2024)    Housing Stability     Housing Instability: No                  Physical Exam      ED Triage Vitals [12/12/24 1304]   /72   Pulse 97   Resp 14   Temp 97.3 °F (36.3 °C)   Temp src Temporal   SpO2 97 %   O2 Device None (Room air)       Current Vitals:   Vital Signs  BP: 134/72  Pulse: 97  Resp: 14  Temp: 97.3 °F (36.3 °C)  Temp src: Temporal    Oxygen Therapy  SpO2: 97 %  O2 Device: None (Room air)        Physical Exam  Mildly confused very dry oral mucosa neck is supple lungs are clear to auscultation heart has regular rate and rhythm without murmurs rubs or gallops abdomen is soft nontender nondistended.  Upper and lower extremities are benign without edema.  Patient is a poor historian son is at bedside.  Other than very dry oral mucosa and significant dementia limiting her as a historian, this is a nontoxic presentation    ED Course     Labs Reviewed   CBC WITH DIFFERENTIAL WITH PLATELET - Abnormal; Notable for the following components:       Result Value    WBC 18.5 (*)     Neutrophil Absolute Prelim 14.04 (*)     Neutrophil Absolute 14.04 (*)     Monocyte Absolute 1.55 (*)     All other components within normal limits   COMP METABOLIC PANEL (14) - Abnormal; Notable for the following components:    Glucose 117 (*)     eGFR-Cr 56 (*)     AST 39 (*)     All other components within normal limits   URINALYSIS WITH CULTURE REFLEX - Abnormal; Notable for the following components:    Clarity Urine Cloudy (*)     Ketones Urine Trace (*)     Blood Urine Trace-Intact (*)     Protein Urine 100 mg/dL (*)     Nitrite Urine Positive (*)     Leukocyte Esterase Urine Small (*)     All other components within normal limits   LACTIC ACID, PLASMA - Normal   UA MICROSCOPIC ONLY, URINE   RAINBOW DRAW LAVENDER   RAINBOW DRAW LIGHT GREEN   BLOOD CULTURE   BLOOD CULTURE   C. DIFFICILE(TOXIGENIC)PCR   URINE CULTURE, ROUTINE       ED Course as of 12/12/24 1703  ------------------------------------------------------------  Time: 12/12 1702  Value: Nitrite Urine(!): Positive  Comment: Will treat with some  Rocephin and sent home with Keflex.  Patient has no CVA tenderness signs of pyelonephritis nor even any abdominal pain   Send denies any diarrhea that he is aware of there was 1 episode of vomiting last night.  Personal review of the medical chart does show that patient has had sepsis and UTI in the past.  No known urinary complaints patient was catheterized for urine sample she is afebrile here white blood cell count is 18.5 with a left shift but lactic acid is only 1.5 CMP is reassuring.  Patient's lungs are clear with no crackles wheezes or rales.Clinically she does seem to be dehydrated.  In November the patient was admitted to the hospital less than a month ago with acute cholecystitis and underwent an ERCP she will have a second ERCP a month from now in January.  She underwent a laparoscopic cholecystectomy on November 15  She had 1 episode of vomiting but does not seem to have any pain here, we will try food and fluids.  After IV fluids son thinks that the patient looks \"more awake\" it seems that the patient had a white blood cell count of 17 yesterday and urinalysis was done that was \"turbid\" and had 50 WBCs and it without nitrates it is unclear if that was a catheterized specimen and I am not sure why that whole workup was done for 1 episode of vomiting unless it is related to the history of cholecystitis that she had last month before her cholecystectomy.  She does not seem to have any abdominal pain on palpation we continue to await the urinalysis.      Chest x-ray done independently interpreted by myself at bedside is essentially unchanged from a chest x-ray done in February 6, 2024.  No new signs of infiltrate or pneumonia.  Patient was reassessed at this time at 4:26 PM.  She states that she feels well she remains at her baseline mental status placed call to lab and urine should be ready within 15 minutes       MDM      84-year-old patient presents to the emergency department from [facility] at  [facility] she does have dementia she was sent to the emergency department for abnormal labs.  She is noted to have a leukocytosis but she has not had fevers she had 1 episode of vomiting she does not have abdominal pain waiting on a urinalysis my review of the urinalysis from yesterday is that it was turbid and there were 50 white blood cells lactic acid here is flat patient continues to have leukocytosis but that could be C. difficile as well blood cultures have been sent we are truly awaiting a urinalysis only chest x-ray is not with acute disease on it.    I do think that we can pend these blood cultures in this urine culture once the urinalysis comes back, we can send the patient home with a C. difficile stool collection bucket but right now clinically she was perhaps a bit dry and we will continue to monitor the leukocytosis but without fever, any constitutional symptoms that I able to surmise, I think we will have to reassess if patient becomes clinically ill and check for any C. difficile and monitor blood cultures    Medical Decision Making      Disposition and Plan     Clinical Impression:  1. Leukocytosis, unspecified type    2. Acute cystitis without hematuria    3. Dehydration         Disposition:  Discharge  12/12/2024  4:55 pm    Follow-up:  [facility] Emergency Department  [address] [place] [address] [id number] [phone]  Follow up  If symptoms worsen          Medications Prescribed:  Current Discharge Medication List        START taking these medications    Details   cephALEXin 500 MG Oral Cap Take 1 capsule (500 mg total) by mouth 4 (four) times daily for 10 days.  Qty: 40 capsule, Refills: 0                 Supplementary Documentation:

## 2024-12-12 NOTE — DISCHARGE INSTRUCTIONS
Blood cultures and urine cultures will be pending if patient gets fevers or chills or worsening symptoms may return to the emergency department but right now clinically patient is quite well and at her baseline we will continue the antibiotics while we await final cultures.

## 2024-12-16 ENCOUNTER — LAB REQUISITION (OUTPATIENT)
Dept: LAB | Facility: HOSPITAL | Age: 84
End: 2024-12-16
Payer: MEDICARE

## 2024-12-16 DIAGNOSIS — M62.81 MUSCLE WEAKNESS (GENERALIZED): ICD-10-CM

## 2024-12-16 DIAGNOSIS — Z48.815 ENCOUNTER FOR SURGICAL AFTERCARE FOLLOWING SURGERY ON THE DIGESTIVE SYSTEM: ICD-10-CM

## 2024-12-16 LAB
ALBUMIN SERPL-MCNC: 3.4 G/DL (ref 3.2–4.8)
ALBUMIN/GLOB SERPL: 1.3 {RATIO} (ref 1–2)
ALP LIVER SERPL-CCNC: 124 U/L
ALT SERPL-CCNC: 26 U/L
ANION GAP SERPL CALC-SCNC: 10 MMOL/L (ref 0–18)
AST SERPL-CCNC: 23 U/L (ref ?–34)
BASOPHILS # BLD AUTO: 0.03 X10(3) UL (ref 0–0.2)
BASOPHILS NFR BLD AUTO: 0.3 %
BILIRUB SERPL-MCNC: 0.3 MG/DL (ref 0.2–1.1)
BUN BLD-MCNC: 11 MG/DL (ref 9–23)
CALCIUM BLD-MCNC: 9.5 MG/DL (ref 8.7–10.4)
CHLORIDE SERPL-SCNC: 109 MMOL/L (ref 98–112)
CO2 SERPL-SCNC: 24 MMOL/L (ref 21–32)
CREAT BLD-MCNC: 0.76 MG/DL
EGFRCR SERPLBLD CKD-EPI 2021: 78 ML/MIN/1.73M2 (ref 60–?)
EOSINOPHIL # BLD AUTO: 0.18 X10(3) UL (ref 0–0.7)
EOSINOPHIL NFR BLD AUTO: 1.6 %
ERYTHROCYTE [DISTWIDTH] IN BLOOD BY AUTOMATED COUNT: 13 %
FASTING STATUS PATIENT QL REPORTED: YES
GLOBULIN PLAS-MCNC: 2.6 G/DL (ref 2–3.5)
GLUCOSE BLD-MCNC: 93 MG/DL (ref 70–99)
HCT VFR BLD AUTO: 34 %
HGB BLD-MCNC: 11 G/DL
IMM GRANULOCYTES # BLD AUTO: 0.07 X10(3) UL (ref 0–1)
IMM GRANULOCYTES NFR BLD: 0.6 %
LYMPHOCYTES # BLD AUTO: 2.38 X10(3) UL (ref 1–4)
LYMPHOCYTES NFR BLD AUTO: 20.7 %
MCH RBC QN AUTO: 30 PG (ref 26–34)
MCHC RBC AUTO-ENTMCNC: 32.4 G/DL (ref 31–37)
MCV RBC AUTO: 92.6 FL
MONOCYTES # BLD AUTO: 1.18 X10(3) UL (ref 0.1–1)
MONOCYTES NFR BLD AUTO: 10.3 %
NEUTROPHILS # BLD AUTO: 7.63 X10 (3) UL (ref 1.5–7.7)
NEUTROPHILS # BLD AUTO: 7.63 X10(3) UL (ref 1.5–7.7)
NEUTROPHILS NFR BLD AUTO: 66.5 %
OSMOLALITY SERPL CALC.SUM OF ELEC: 295 MOSM/KG (ref 275–295)
PLATELET # BLD AUTO: 206 10(3)UL (ref 150–450)
POTASSIUM SERPL-SCNC: 3.4 MMOL/L (ref 3.5–5.1)
PROT SERPL-MCNC: 6 G/DL (ref 5.7–8.2)
RBC # BLD AUTO: 3.67 X10(6)UL
SODIUM SERPL-SCNC: 143 MMOL/L (ref 136–145)
WBC # BLD AUTO: 11.5 X10(3) UL (ref 4–11)

## 2024-12-16 PROCEDURE — 85025 COMPLETE CBC W/AUTO DIFF WBC: CPT | Performed by: HOSPITALIST

## 2024-12-16 PROCEDURE — 80053 COMPREHEN METABOLIC PANEL: CPT | Performed by: HOSPITALIST

## 2024-12-23 ENCOUNTER — LAB REQUISITION (OUTPATIENT)
Dept: LAB | Facility: HOSPITAL | Age: 84
End: 2024-12-23
Payer: MEDICARE

## 2024-12-23 DIAGNOSIS — A41.9 SEPSIS, UNSPECIFIED ORGANISM (HCC): ICD-10-CM

## 2024-12-23 DIAGNOSIS — Z48.815 ENCOUNTER FOR SURGICAL AFTERCARE FOLLOWING SURGERY ON THE DIGESTIVE SYSTEM: ICD-10-CM

## 2024-12-23 LAB
ALBUMIN SERPL-MCNC: 3.7 G/DL (ref 3.2–4.8)
ALBUMIN/GLOB SERPL: 1.2 {RATIO} (ref 1–2)
ALP LIVER SERPL-CCNC: 107 U/L
ALT SERPL-CCNC: 13 U/L
ANION GAP SERPL CALC-SCNC: 7 MMOL/L (ref 0–18)
AST SERPL-CCNC: 15 U/L (ref ?–34)
BASOPHILS # BLD AUTO: 0.04 X10(3) UL (ref 0–0.2)
BASOPHILS NFR BLD AUTO: 0.4 %
BILIRUB SERPL-MCNC: 0.2 MG/DL (ref 0.2–1.1)
BUN BLD-MCNC: 18 MG/DL (ref 9–23)
CALCIUM BLD-MCNC: 10.1 MG/DL (ref 8.7–10.4)
CHLORIDE SERPL-SCNC: 108 MMOL/L (ref 98–112)
CO2 SERPL-SCNC: 26 MMOL/L (ref 21–32)
CREAT BLD-MCNC: 0.86 MG/DL
EGFRCR SERPLBLD CKD-EPI 2021: 67 ML/MIN/1.73M2 (ref 60–?)
EOSINOPHIL # BLD AUTO: 0.21 X10(3) UL (ref 0–0.7)
EOSINOPHIL NFR BLD AUTO: 2.3 %
ERYTHROCYTE [DISTWIDTH] IN BLOOD BY AUTOMATED COUNT: 13.2 %
GLOBULIN PLAS-MCNC: 3 G/DL (ref 2–3.5)
GLUCOSE BLD-MCNC: 92 MG/DL (ref 70–99)
HCT VFR BLD AUTO: 36.2 %
HGB BLD-MCNC: 11.5 G/DL
IMM GRANULOCYTES # BLD AUTO: 0.1 X10(3) UL (ref 0–1)
IMM GRANULOCYTES NFR BLD: 1.1 %
LYMPHOCYTES # BLD AUTO: 2.43 X10(3) UL (ref 1–4)
LYMPHOCYTES NFR BLD AUTO: 26.7 %
MCH RBC QN AUTO: 29.9 PG (ref 26–34)
MCHC RBC AUTO-ENTMCNC: 31.8 G/DL (ref 31–37)
MCV RBC AUTO: 94 FL
MONOCYTES # BLD AUTO: 0.72 X10(3) UL (ref 0.1–1)
MONOCYTES NFR BLD AUTO: 7.9 %
NEUTROPHILS # BLD AUTO: 5.61 X10 (3) UL (ref 1.5–7.7)
NEUTROPHILS # BLD AUTO: 5.61 X10(3) UL (ref 1.5–7.7)
NEUTROPHILS NFR BLD AUTO: 61.6 %
OSMOLALITY SERPL CALC.SUM OF ELEC: 294 MOSM/KG (ref 275–295)
PLATELET # BLD AUTO: 341 10(3)UL (ref 150–450)
POTASSIUM SERPL-SCNC: 4.4 MMOL/L (ref 3.5–5.1)
PROT SERPL-MCNC: 6.7 G/DL (ref 5.7–8.2)
RBC # BLD AUTO: 3.85 X10(6)UL
SODIUM SERPL-SCNC: 141 MMOL/L (ref 136–145)
WBC # BLD AUTO: 9.1 X10(3) UL (ref 4–11)

## 2024-12-23 PROCEDURE — 80053 COMPREHEN METABOLIC PANEL: CPT | Performed by: HOSPITALIST

## 2024-12-23 PROCEDURE — 85025 COMPLETE CBC W/AUTO DIFF WBC: CPT | Performed by: HOSPITALIST

## 2025-01-06 RX ORDER — FAMOTIDINE 20 MG/1
20 TABLET, FILM COATED ORAL
COMMUNITY

## 2025-01-06 RX ORDER — DOCUSATE SODIUM 100 MG/1
100 CAPSULE, LIQUID FILLED ORAL
COMMUNITY

## 2025-01-08 ENCOUNTER — ANESTHESIA EVENT (OUTPATIENT)
Dept: ENDOSCOPY | Facility: HOSPITAL | Age: 85
End: 2025-01-08
Payer: MEDICARE

## 2025-01-20 ENCOUNTER — LAB REQUISITION (OUTPATIENT)
Dept: LAB | Facility: HOSPITAL | Age: 85
End: 2025-01-20
Payer: MEDICARE

## 2025-01-20 DIAGNOSIS — M62.81 MUSCLE WEAKNESS (GENERALIZED): ICD-10-CM

## 2025-01-20 LAB
ALBUMIN SERPL-MCNC: 3.6 G/DL (ref 3.2–4.8)
ALBUMIN/GLOB SERPL: 1.5 {RATIO} (ref 1–2)
ALP LIVER SERPL-CCNC: 100 U/L
ALT SERPL-CCNC: 48 U/L
ANION GAP SERPL CALC-SCNC: 7 MMOL/L (ref 0–18)
AST SERPL-CCNC: 37 U/L (ref ?–34)
BASOPHILS # BLD AUTO: 0.03 X10(3) UL (ref 0–0.2)
BASOPHILS NFR BLD AUTO: 0.5 %
BILIRUB SERPL-MCNC: 0.3 MG/DL (ref 0.2–1.1)
BUN BLD-MCNC: 19 MG/DL (ref 9–23)
CALCIUM BLD-MCNC: 9.8 MG/DL (ref 8.7–10.6)
CHLORIDE SERPL-SCNC: 112 MMOL/L (ref 98–112)
CO2 SERPL-SCNC: 27 MMOL/L (ref 21–32)
CREAT BLD-MCNC: 0.99 MG/DL
EGFRCR SERPLBLD CKD-EPI 2021: 57 ML/MIN/1.73M2 (ref 60–?)
EOSINOPHIL # BLD AUTO: 0.17 X10(3) UL (ref 0–0.7)
EOSINOPHIL NFR BLD AUTO: 2.8 %
ERYTHROCYTE [DISTWIDTH] IN BLOOD BY AUTOMATED COUNT: 13.8 %
FASTING STATUS PATIENT QL REPORTED: YES
GLOBULIN PLAS-MCNC: 2.4 G/DL (ref 2–3.5)
GLUCOSE BLD-MCNC: 85 MG/DL (ref 70–99)
HCT VFR BLD AUTO: 38.4 %
HGB BLD-MCNC: 12.3 G/DL
IMM GRANULOCYTES # BLD AUTO: 0.03 X10(3) UL (ref 0–1)
IMM GRANULOCYTES NFR BLD: 0.5 %
LYMPHOCYTES # BLD AUTO: 2.66 X10(3) UL (ref 1–4)
LYMPHOCYTES NFR BLD AUTO: 43.3 %
MCH RBC QN AUTO: 29.9 PG (ref 26–34)
MCHC RBC AUTO-ENTMCNC: 32 G/DL (ref 31–37)
MCV RBC AUTO: 93.2 FL
MONOCYTES # BLD AUTO: 0.66 X10(3) UL (ref 0.1–1)
MONOCYTES NFR BLD AUTO: 10.7 %
NEUTROPHILS # BLD AUTO: 2.59 X10 (3) UL (ref 1.5–7.7)
NEUTROPHILS # BLD AUTO: 2.59 X10(3) UL (ref 1.5–7.7)
NEUTROPHILS NFR BLD AUTO: 42.2 %
OSMOLALITY SERPL CALC.SUM OF ELEC: 304 MOSM/KG (ref 275–295)
PLATELET # BLD AUTO: 190 10(3)UL (ref 150–450)
POTASSIUM SERPL-SCNC: 3.9 MMOL/L (ref 3.5–5.1)
PROT SERPL-MCNC: 6 G/DL (ref 5.7–8.2)
RBC # BLD AUTO: 4.12 X10(6)UL
SODIUM SERPL-SCNC: 146 MMOL/L (ref 136–145)
WBC # BLD AUTO: 6.1 X10(3) UL (ref 4–11)

## 2025-01-20 PROCEDURE — 80053 COMPREHEN METABOLIC PANEL: CPT | Performed by: HOSPITALIST

## 2025-01-20 PROCEDURE — 85025 COMPLETE CBC W/AUTO DIFF WBC: CPT | Performed by: HOSPITALIST

## 2025-01-22 ENCOUNTER — HOSPITAL ENCOUNTER (OUTPATIENT)
Facility: HOSPITAL | Age: 85
Setting detail: HOSPITAL OUTPATIENT SURGERY
Discharge: NURSING, REHAB OR LONG-TERM CARE | End: 2025-01-22
Attending: INTERNAL MEDICINE | Admitting: INTERNAL MEDICINE
Payer: MEDICARE

## 2025-01-22 ENCOUNTER — APPOINTMENT (OUTPATIENT)
Dept: GENERAL RADIOLOGY | Facility: HOSPITAL | Age: 85
End: 2025-01-22
Attending: INTERNAL MEDICINE
Payer: MEDICARE

## 2025-01-22 ENCOUNTER — ANESTHESIA (OUTPATIENT)
Dept: ENDOSCOPY | Facility: HOSPITAL | Age: 85
End: 2025-01-22
Payer: MEDICARE

## 2025-01-22 VITALS
TEMPERATURE: 97 F | RESPIRATION RATE: 14 BRPM | BODY MASS INDEX: 26.53 KG/M2 | OXYGEN SATURATION: 95 % | DIASTOLIC BLOOD PRESSURE: 70 MMHG | HEART RATE: 70 BPM | HEIGHT: 67 IN | WEIGHT: 169 LBS | SYSTOLIC BLOOD PRESSURE: 120 MMHG

## 2025-01-22 PROCEDURE — 74328 X-RAY BILE DUCT ENDOSCOPY: CPT | Performed by: INTERNAL MEDICINE

## 2025-01-22 PROCEDURE — 0FPB8DZ REMOVAL OF INTRALUMINAL DEVICE FROM HEPATOBILIARY DUCT, VIA NATURAL OR ARTIFICIAL OPENING ENDOSCOPIC: ICD-10-PCS | Performed by: INTERNAL MEDICINE

## 2025-01-22 RX ORDER — SODIUM CHLORIDE, SODIUM LACTATE, POTASSIUM CHLORIDE, CALCIUM CHLORIDE 600; 310; 30; 20 MG/100ML; MG/100ML; MG/100ML; MG/100ML
INJECTION, SOLUTION INTRAVENOUS CONTINUOUS
Status: DISCONTINUED | OUTPATIENT
Start: 2025-01-22 | End: 2025-01-22

## 2025-01-22 RX ORDER — LABETALOL HYDROCHLORIDE 5 MG/ML
INJECTION, SOLUTION INTRAVENOUS AS NEEDED
Status: DISCONTINUED | OUTPATIENT
Start: 2025-01-22 | End: 2025-01-22 | Stop reason: SURG

## 2025-01-22 RX ORDER — LIDOCAINE HYDROCHLORIDE 20 MG/ML
INJECTION, SOLUTION EPIDURAL; INFILTRATION; INTRACAUDAL; PERINEURAL AS NEEDED
Status: DISCONTINUED | OUTPATIENT
Start: 2025-01-22 | End: 2025-01-22 | Stop reason: SURG

## 2025-01-22 RX ORDER — INDOMETHACIN 100 MG
100 SUPPOSITORY, RECTAL RECTAL ONCE
Status: DISCONTINUED | OUTPATIENT
Start: 2025-01-22 | End: 2025-01-22

## 2025-01-22 RX ORDER — INDOMETHACIN 100 MG
SUPPOSITORY, RECTAL RECTAL
Status: DISCONTINUED
Start: 2025-01-22 | End: 2025-01-22

## 2025-01-22 RX ORDER — SODIUM CHLORIDE, SODIUM LACTATE, POTASSIUM CHLORIDE, CALCIUM CHLORIDE 600; 310; 30; 20 MG/100ML; MG/100ML; MG/100ML; MG/100ML
100 INJECTION, SOLUTION INTRAVENOUS CONTINUOUS
Status: DISCONTINUED | OUTPATIENT
Start: 2025-01-22 | End: 2025-01-22

## 2025-01-22 RX ADMIN — LIDOCAINE HYDROCHLORIDE 50 MG: 20 INJECTION, SOLUTION EPIDURAL; INFILTRATION; INTRACAUDAL; PERINEURAL at 13:09:00

## 2025-01-22 RX ADMIN — LABETALOL HYDROCHLORIDE 10 MG: 5 INJECTION, SOLUTION INTRAVENOUS at 13:12:00

## 2025-01-22 NOTE — OPERATIVE REPORT
Mar Hernandez Patient Status:  Hospital Outpatient Surgery    1940 MRN [id number]   [facility] ENDOSCOPY PAIN CENTER Attending Martínez Blakely DO   Hosp Day # 0 PCP Dorothy Owens MD     PREOPERATIVE DIAGNOSIS/INDICATION: History of bile leak s/p ercp w/ biliary stent placement  POSTOPERTATIVE DIAGNOSIS: See Impression  PROCEDURE PERFORMED: ERCP WITH BILIARY STENT REMOVAL  DATE: 25  TIME OUT WAS PERFORMED    SEDATION: MAC sedation provided by General Anesthesia    INFORMED CONSENT: I have discussed the risks, benefits, and alternatives to endoscopic retrograde cholangiopancreatography (ERCP) with possible intervention [i.e. sphincterotomy, stent placement etc.] with the patient including but not limited to the risks of bleeding, cholangitis, cholecystitis, pancreatitis, pain, as well as the risks of anesthesia and perforation all possibly leading to prolonged hospitalization and surgical intervention. All questions were answered to the patient’s satisfaction. The patient elected to proceed with ERCP with intervention as indicated.  PROCEDURE DESCRIPTION: The duodenoscope was introduced into the patient’s mouth, hypo pharynx, esophagus, stomach and the first and second portion of the duodenum, straightening of the endoscope was performed to obtain a direct view of the major ampulla. Careful but limited examination of the above described areas was performed on withdrawal of the endoscope. The patient tolerated the procedure well and there were no immediate complications noted during the procedure, the patient was transported to the recovery area in stable condition.  FINDINGS:  A  film was taken. The duodenoscope was advanced to the ampulla with a plastic biliary stent protruding from it. The stent was grasped using a snare and removed. The bile duct was cannulated using a 9-12 mm balloon catheter loaded with a 0.035 jagwire. The wire was advanced to the intrahepatics. The  balloon was inflated to 12 mm and contrast injected. An occlusion cholangiogram showed no bile leak. Balloon sweeps were performed starting at the bifurcation without removal of stone or sludge. The pancreatic duct was not cannulated. The duodenoscope was withdrawn to the stomach and all contents were suctioned. The duodenoscope was withdrawn from the patient and the procedure completed.     IMPRESSION:   1. No persistent bile leak.     2. Removal of previously placed plastic biliary stent.   RECOMMENDATIONS:    1. Ok to discharge home.    2. Diet as tolerated.     DANE [facility]  Gastroenterology/Advanced Endoscopy  [facility]

## 2025-01-22 NOTE — ANESTHESIA PREPROCEDURE EVALUATION
PRE-OP EVALUATION    Patient Name: Mar Hernandez    Admit Diagnosis: Encounter for removal of biliary stent [Z46.89]    Pre-op Diagnosis: Encounter for removal of biliary stent [Z46.89]    ENDOSCOPIC RETROGRADE CHOLANGIOPANCREATOGRAPHY (ERCP)    Anesthesia Procedure: ENDOSCOPIC RETROGRADE CHOLANGIOPANCREATOGRAPHY (ERCP)    Surgeons and Role:     * Martínez Blakely, DO - Primary    Pre-op vitals reviewed.  Temp: 97.4 °F (36.3 °C)  Pulse: 67  Resp: 16  BP: 147/72  SpO2: 100 %  Body mass index is 26.47 kg/m².    Current medications reviewed.  Hospital Medications:   lactated ringers infusion   Intravenous Continuous    lactated ringers IV bolus 1,000 mL  1,000 mL Intravenous Once    Followed by    lactated ringers infusion  100 mL/hr Intravenous Continuous    indomethacin (Indocin) 100 MG rectal suppository 100 mg  100 mg Rectal Once    indomethacin (Indocin) 100 MG rectal suppository           Outpatient Medications:   Prescriptions Prior to Admission[1]    Allergies: Patient has no known allergies.      Anesthesia Evaluation    Patient summary reviewed.    Anesthetic Complications  (-) history of anesthetic complications         GI/Hepatic/Renal    Negative GI/hepatic/renal ROS.                             Cardiovascular      ECG reviewed.      MET: >4  Unable to assess MET.    (+) hypertension                                     Endo/Other           (-) hypothyroidism                       Pulmonary  Comment: Hx of PE on AC                         Neuro/Psych  Comment: Dementia                   (+) psychiatric history         Advanced alzheimer   Patient Active Problem List:     Primary hypertension     Acquired hypothyroidism     Intermediate stage nonexudative age-related macular degeneration of both eyes     Severe dementia with agitation (HCC)     Physical deconditioning     Closed fracture of left hip, initial encounter (HCC)     Nausea and vomiting     Atelectasis     Hypoxia     Acute hypoxemic  respiratory failure (HCC)     Pulmonary embolus (HCC)     Acute cholecystitis     Sepsis, due to unspecified organism, unspecified whether acute organ dysfunction present (HCC)            Past Surgical History:   Procedure Laterality Date    Cholecystectomy  11/15/2024    LAPAROSCOPIC CHOLECYSTECTOMY WITH INTRAOPERATIVE CHOLANGIOGRAM    Hip fracture surgery Right 02/03/2024    repaired at [facility]    Hysterectomy      Skin surgery Left 04/27/2016    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    Skin surgery Left 04/27/2016    ED&C of SCCIS to L Upper Arm by SD    Skin surgery Left 04/27/2016    ED&C of SCCIS to L Upper Back by SD     Social History     Socioeconomic History    Marital status:    Tobacco Use    Smoking status: Never    Smokeless tobacco: Never   Vaping Use    Vaping status: Never Used   Substance and Sexual Activity    Alcohol use: Not Currently     Alcohol/week: 1.0 standard drink of alcohol     Types: 1 Glasses of wine per week     Comment: 1-2 per week    Drug use: No   Other Topics Concern    Caffeine Concern Yes     Comment: 1-2 per day    Exercise No    Seat Belt Yes     History   Drug Use No     Available pre-op labs reviewed.  Lab Results   Component Value Date    WBC 6.1 01/20/2025    RBC 4.12 01/20/2025    HGB 12.3 01/20/2025    HCT 38.4 01/20/2025    MCV 93.2 01/20/2025    MCH 29.9 01/20/2025    MCHC 32.0 01/20/2025    RDW 13.8 01/20/2025    .0 01/20/2025     Lab Results   Component Value Date     (H) 01/20/2025    K 3.9 01/20/2025     01/20/2025    CO2 27.0 01/20/2025    BUN 19 01/20/2025    CREATSERUM 0.99 01/20/2025    GLU 85 01/20/2025    CA 9.8 01/20/2025            Airway      Mallampati: II  Mouth opening: >3 FB  TM distance: 4 - 6 cm  Neck ROM: full Cardiovascular    Cardiovascular exam normal.  Rhythm: regular  Rate: normal     Dental  Comment: No gross abnormalities noted on exam. Patient denies any loose/missing/cracked teeth.               Pulmonary    Pulmonary exam normal.                 Other findings              ASA: 3   Plan: MAC  NPO status verified and patient meets guidelines.    Post-procedure pain management plan discussed with surgeon and patient.    Comment: I explained the intrinsic risks of MAC anesthesia to Mar Hernandez and her son including intraoperative awareness/recall, PONV, post-operative pain/discomfort, risk of aspiration, insertion of naso- or oropharyngeal airways, conversion to general anesthesia, dental injury, pressure/nerve injuries from positioning, and other serious but rare complications (life-threatening cardiopulmonary events). All questions were answered and patient demonstrated understanding of realistic expectations and risks of undergoing anesthesia. Informed consent was signed by patient's son.  Plan/risks discussed with: patient and child/children                Present on Admission:  **None**             [1]   Medications Prior to Admission   Medication Sig Dispense Refill Last Dose/Taking    docusate sodium 100 MG Oral Cap Take 1 capsule (100 mg total) by mouth daily as needed for constipation.   1/21/2025    famotidine 20 MG Oral Tab Take 1 tablet (20 mg total) by mouth daily as needed for Heartburn.   1/21/2025    Potassium Chloride 10 MEQ Oral Powd Pack Take 1 packet by mouth daily.   1/21/2025    pantoprazole 40 MG Oral Tab EC Take 1 tablet (40 mg total) by mouth 2 (two) times daily before meals. 60 tablet 1 1/21/2025    atorvastatin 10 MG Oral Tab Take 1 tablet (10 mg total) by mouth at bedtime.   1/21/2025    hydrALAZINE 10 MG Oral Tab Take 1 tablet (10 mg total) by mouth every 6 (six) hours as needed (for SBP > 160).   1/20/2025    lisinopril 5 MG Oral Tab Take 2 tablets (10 mg total) by mouth daily. Hold if SBP < 100   1/22/2025    polyethylene glycol, PEG 3350, 17 g Oral Powd Pack Take 17 g by mouth daily.   Taking    senna-docusate 8.6-50 MG Oral Tab Take 1 tablet by mouth 2 (two) times daily.    1/21/2025    QUEtiapine 50 MG Oral Tab Take 1 tablet (50 mg total) by mouth at bedtime.   1/21/2025    traZODone 25 mg Oral Tab Take 1 Partial Tablet (25 mg total) by mouth at bedtime.   1/21/2025    Ipratropium Bromide 0.06 % Nasal Solution 1 spray by Nasal route 4 (four) times daily. 1 each 0 1/22/2025    MEMANTINE HCL 10 MG Oral Tab TAKE 1 TAB BY MOUTH TWICE DAILY 90 tablet 3 1/21/2025    LEVOTHYROXINE SODIUM 75 MCG Oral Tab TAKE 1 TAB BY MOUTH EVERY MORNING BEFORE BREAKFAST 90 tablet 0 1/21/2025    CALCIUM + VITAMIN D3 600-10 MG-MCG Oral Tab TAKE 1 TAB BY MOUTH TWICE DAILY 90 tablet 3 1/21/2025    VITAMIN B-12 250 MCG Oral Tab TAKE 1 TAB BY MOUTH DAILY 90 tablet 3 1/21/2025    Donepezil HCl 10 MG Oral Tab Take 1 tablet (10 mg total) by mouth daily. 90 tablet 3 1/21/2025    Multiple Vitamins-Minerals (PRESERVISION AREDS) Oral Tab Take 1 capsule by mouth 2 (two) times daily. 180 tablet 3 1/21/2025    traMADol 50 MG Oral Tab Take 1 tablet (50 mg total) by mouth every 8 (eight) hours as needed for Pain. 20 tablet 0 Unknown    acetaminophen 500 MG Oral Tab Take 1 tablet (500 mg total) by mouth 2 (two) times daily.   Unknown    acetaminophen 325 MG Oral Tab Take 2 tablets (650 mg total) by mouth every 6 (six) hours as needed for Pain or Fever.   Unknown    bisacodyl 5 MG Oral Tab EC Take 2 tablets (10 mg total) by mouth every 12 (twelve) hours as needed for constipation.   Unknown    benzonatate 100 MG Oral Cap Take 1 capsule (100 mg total) by mouth every 8 (eight) hours as needed for cough.   More than a month    fexofenadine 180 MG Oral Tab Take 1 tablet (180 mg total) by mouth Q24H PRN (for sinus headaches, post nasal drip). 30 tablet 0 Unknown

## 2025-01-22 NOTE — DISCHARGE INSTRUCTIONS
Home Care Instructions for  ENDOSCOPIC RETROGRADE CHOLANGIOPANCREATOGRAPHY (ERCP) with Sedation    Diet:  - Resume your regular diet as tolerated unless otherwise instructed.  - Start with light meals to minimize bloating.  - Do not drink alcohol today.    Medication:  - If you have questions about resuming your normal medications, please contact your Primary Care Physician.    Activities:  - Take it easy today. Do not return to work today.  - Do not drive today.  - Do not operate any machinery today (including kitchen equipment).    ERCP:  - You may have a sore throat for 2-3 days following the exam. This is normal. Gargling with warm salt water (1/2 tsp salt to 1 glass warm water) or using throat lozenges will help.  - If you experience any sharp pain in your neck, abdomen or chest, vomiting of blood, oral temperature over 100 degrees Fahrenheit, light-headedness or dizziness, or any other problems, contact your doctor.    **If unable to reach your doctor, please go to the [facility] Emergency Room**    - Your referring physician will receive a full report of your examination.  - If you do not hear from your doctor's office within two weeks of your biopsy, please call them for your results.    You may be able to see your laboratory results in Doctor kinetic between 4 and 7 business days.  In some cases, your physician may not have viewed the results before they are released to Doctor kinetic.  If you have questions regarding your results contact the physician who ordered the test/exam by phone or via Doctor kinetic by choosing \"Ask a Medical Question.\"

## 2025-01-22 NOTE — ANESTHESIA POSTPROCEDURE EVALUATION
[facility]    Mar Hernandez Patient Status:  Hospital Outpatient Surgery   Age/Gender 84 year old female MRN [id number]   Location [facility] ENDOSCOPY PAIN CENTER Attending Martínez Blakely DO   Hosp Day # 0 PCP Dorothy Owens MD       Anesthesia Post-op Note    ENDOSCOPIC RETROGRADE CHOLANGIOPANCREATOGRAPHY (ERCP) with stent removal    Procedure Summary       Date: 01/22/25 Room / Location:  ENDOSCOPY 02 /  ENDOSCOPY    Anesthesia Start: 1307 Anesthesia Stop: 1323    Procedure: ENDOSCOPIC RETROGRADE CHOLANGIOPANCREATOGRAPHY (ERCP) with stent removal Diagnosis:       Encounter for removal of biliary stent      (Encounter for removal of biliary stent [Z46.89])    Surgeons: Martínez Blakely DO Anesthesiologist: Alo Thao MD    Anesthesia Type: MAC ASA Status: 3            Anesthesia Type: MAC    Vitals Value Taken Time   /89 01/22/25 1323   Temp   01/22/25 1323   Pulse 82 01/22/25 1323   Resp 14 01/22/25 1323   SpO2 96 01/22/25 1323           Patient Location: Endoscopy    Anesthesia Type: MAC    Airway Patency: patent    Postop Pain Control: adequate    Mental Status: mildly sedated but able to meaningfully participate in the post-anesthesia evaluation    Nausea/Vomiting: none    Cardiopulmonary/Hydration status: stable euvolemic    Complications: no apparent anesthesia related complications    Postop vital signs: stable    Dental Exam: Unchanged from Preop    Patient to be discharged from PACU when criteria met.

## 2025-01-22 NOTE — H&P
History & Physical Examination    Patient Name: Mar Hernandez  MRN: [id number]  [facility]: [id number]  YOB: 1940    Diagnosis: history of bile leak s/p ercp with biliary stent placement    Present Illness: 85 y/o F history as above presents for ERCP.     Prescriptions Prior to Admission[1]  Current Facility-Administered Medications   Medication Dose Route Frequency    lactated ringers infusion   Intravenous Continuous    lactated ringers IV bolus 1,000 mL  1,000 mL Intravenous Once    Followed by    lactated ringers infusion  100 mL/hr Intravenous Continuous    indomethacin (Indocin) 100 MG rectal suppository 100 mg  100 mg Rectal Once    indomethacin (Indocin) 100 MG rectal suppository           Allergies: Allergies[2]    Past Medical History:    Anxiety    Cancer (HCC)    skin    Contusion of scalp    COVID-19 virus infection    Dementia (HCC)    Head injury    S/P hysterectomy     Past Surgical History:   Procedure Laterality Date    Cholecystectomy  11/15/2024    LAPAROSCOPIC CHOLECYSTECTOMY WITH INTRAOPERATIVE CHOLANGIOGRAM    Hip fracture surgery Right 02/03/2024    repaired at [facility]    Hysterectomy      Skin surgery Left 04/27/2016    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    Skin surgery Left 04/27/2016    ED&C of SCCIS to L Upper Arm by SD    Skin surgery Left 04/27/2016    ED&C of SCCIS to L Upper Back by SD     Family History   Problem Relation Age of Onset    Other (Other) Father         Aortic Aneurysm    Hypertension Brother      Social History     Tobacco Use    Smoking status: Never    Smokeless tobacco: Never   Substance Use Topics    Alcohol use: Not Currently     Alcohol/week: 1.0 standard drink of alcohol     Types: 1 Glasses of wine per week     Comment: 1-2 per week       SYSTEM Check if Review is Normal Check if Physical Exam is Normal If not normal, please explain:   HEENT [ x] [x ]    NECK & BACK [ x] [ x]    HEART [ x] [x ]    LUNGS [ x] [ x]    ABDOMEN [ x] [x ]    UROGENITAL [  n/a] [ n/a]    EXTREMITIES [ x] [x ]    OTHER        [ x ] I have discussed the risks and benefits and alternatives with the patient/family.  They understand and agree to proceed with plan of care.  [ x ] I have reviewed the History and Physical done within the last 30 days.  Any changes noted above.    Martínez Blakely DO  1/22/2025  12:15 PM             [1]   Medications Prior to Admission   Medication Sig Dispense Refill Last Dose/Taking    docusate sodium 100 MG Oral Cap Take 1 capsule (100 mg total) by mouth daily as needed for constipation.   1/21/2025    famotidine 20 MG Oral Tab Take 1 tablet (20 mg total) by mouth daily as needed for Heartburn.   1/21/2025    Potassium Chloride 10 MEQ Oral Powd Pack Take 1 packet by mouth daily.   1/21/2025    pantoprazole 40 MG Oral Tab EC Take 1 tablet (40 mg total) by mouth 2 (two) times daily before meals. 60 tablet 1 1/21/2025    atorvastatin 10 MG Oral Tab Take 1 tablet (10 mg total) by mouth at bedtime.   1/21/2025    hydrALAZINE 10 MG Oral Tab Take 1 tablet (10 mg total) by mouth every 6 (six) hours as needed (for SBP > 160).   1/20/2025    lisinopril 5 MG Oral Tab Take 2 tablets (10 mg total) by mouth daily. Hold if SBP < 100   1/22/2025    polyethylene glycol, PEG 3350, 17 g Oral Powd Pack Take 17 g by mouth daily.   Taking    senna-docusate 8.6-50 MG Oral Tab Take 1 tablet by mouth 2 (two) times daily.   1/21/2025    QUEtiapine 50 MG Oral Tab Take 1 tablet (50 mg total) by mouth at bedtime.   1/21/2025    traZODone 25 mg Oral Tab Take 1 Partial Tablet (25 mg total) by mouth at bedtime.   1/21/2025    Ipratropium Bromide 0.06 % Nasal Solution 1 spray by Nasal route 4 (four) times daily. 1 each 0 1/22/2025    MEMANTINE HCL 10 MG Oral Tab TAKE 1 TAB BY MOUTH TWICE DAILY 90 tablet 3 1/21/2025    LEVOTHYROXINE SODIUM 75 MCG Oral Tab TAKE 1 TAB BY MOUTH EVERY MORNING BEFORE BREAKFAST 90 tablet 0 1/21/2025    CALCIUM + VITAMIN D3 600-10 MG-MCG Oral Tab TAKE 1 TAB BY  MOUTH TWICE DAILY 90 tablet 3 1/21/2025    VITAMIN B-12 250 MCG Oral Tab TAKE 1 TAB BY MOUTH DAILY 90 tablet 3 1/21/2025    Donepezil HCl 10 MG Oral Tab Take 1 tablet (10 mg total) by mouth daily. 90 tablet 3 1/21/2025    Multiple Vitamins-Minerals (PRESERVISION AREDS) Oral Tab Take 1 capsule by mouth 2 (two) times daily. 180 tablet 3 1/21/2025    traMADol 50 MG Oral Tab Take 1 tablet (50 mg total) by mouth every 8 (eight) hours as needed for Pain. 20 tablet 0 Unknown    acetaminophen 500 MG Oral Tab Take 1 tablet (500 mg total) by mouth 2 (two) times daily.   Unknown    acetaminophen 325 MG Oral Tab Take 2 tablets (650 mg total) by mouth every 6 (six) hours as needed for Pain or Fever.   Unknown    bisacodyl 5 MG Oral Tab EC Take 2 tablets (10 mg total) by mouth every 12 (twelve) hours as needed for constipation.   Unknown    benzonatate 100 MG Oral Cap Take 1 capsule (100 mg total) by mouth every 8 (eight) hours as needed for cough.   More than a month    fexofenadine 180 MG Oral Tab Take 1 tablet (180 mg total) by mouth Q24H PRN (for sinus headaches, post nasal drip). 30 tablet 0 Unknown   [2] No Known Allergies

## 2025-02-06 ENCOUNTER — HOSPITAL ENCOUNTER (EMERGENCY)
Facility: HOSPITAL | Age: 85
Discharge: HOME | End: 2025-02-06
Attending: EMERGENCY MEDICINE
Payer: MEDICARE

## 2025-02-06 ENCOUNTER — APPOINTMENT (OUTPATIENT)
Dept: CT IMAGING | Facility: HOSPITAL | Age: 85
End: 2025-02-06
Attending: EMERGENCY MEDICINE
Payer: MEDICARE

## 2025-02-06 VITALS
RESPIRATION RATE: 18 BRPM | TEMPERATURE: 98 F | DIASTOLIC BLOOD PRESSURE: 86 MMHG | SYSTOLIC BLOOD PRESSURE: 139 MMHG | OXYGEN SATURATION: 97 % | HEART RATE: 81 BPM

## 2025-02-06 DIAGNOSIS — S09.90XA CLOSED HEAD INJURY, INITIAL ENCOUNTER: Primary | ICD-10-CM

## 2025-02-06 PROCEDURE — 99284 EMERGENCY DEPT VISIT MOD MDM: CPT

## 2025-02-06 PROCEDURE — 70450 CT HEAD/BRAIN W/O DYE: CPT | Performed by: EMERGENCY MEDICINE

## 2025-02-07 NOTE — ED INITIAL ASSESSMENT (HPI)
Pt arrives via EMS from [place], witnessed fall attempting to get out of wheelchair. Fell backward and hit head, also having pain to bottom. Also having left arm pain. NO LOC or thinners. A&O x1 per norm.

## 2025-02-07 NOTE — ED PROVIDER NOTES
Patient Seen in: [facility] Emergency Department      History     Chief Complaint   Patient presents with    Fall     Stated Complaint: fal    Subjective:   HPI      Patient here after reportedly witnessed fall.  She has a history of dementia and I will provide a history.  She apparently fell forward struck her head.    Objective:     Past Medical History:    Anxiety    Cancer (HCC)    skin    Contusion of scalp    COVID-19 virus infection    Dementia (HCC)    Head injury    S/P hysterectomy              Past Surgical History:   Procedure Laterality Date    Cholecystectomy  11/15/2024    LAPAROSCOPIC CHOLECYSTECTOMY WITH INTRAOPERATIVE CHOLANGIOGRAM    Hip fracture surgery Right 02/03/2024    repaired at [facility]    Hysterectomy      Skin surgery Left 04/27/2016    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    Skin surgery Left 04/27/2016    ED&C of SCCIS to L Upper Arm by SD    Skin surgery Left 04/27/2016    ED&C of SCCIS to L Upper Back by SD                Social History     Socioeconomic History    Marital status:    Tobacco Use    Smoking status: Never    Smokeless tobacco: Never   Vaping Use    Vaping status: Never Used   Substance and Sexual Activity    Alcohol use: Not Currently     Alcohol/week: 1.0 standard drink of alcohol     Types: 1 Glasses of wine per week     Comment: 1-2 per week    Drug use: No   Other Topics Concern    Caffeine Concern Yes     Comment: 1-2 per day    Exercise No    Seat Belt Yes     Social Drivers of Health     Food Insecurity: No Food Insecurity (11/15/2024)    Food Insecurity     Food Insecurity: Never true   Transportation Needs: No Transportation Needs (11/15/2024)    Transportation Needs     Lack of Transportation: No   Housing Stability: Low Risk  (11/15/2024)    Housing Stability     Housing Instability: No                  Physical Exam     ED Triage Vitals   BP 02/06/25 1945 131/77   Pulse 02/06/25 1945 82   Resp 02/06/25 1945 18   Temp 02/06/25 1947 98 °F (36.7  °C)   Temp src 02/06/25 1947 Temporal   SpO2 02/06/25 1945 97 %   O2 Device 02/06/25 1945 None (Room air)       Current Vitals:   Vital Signs  BP: 139/86  Pulse: 81  Resp: 18  Temp: 98 °F (36.7 °C)  Temp src: Temporal  MAP (mmHg): 100    Oxygen Therapy  SpO2: 97 %  O2 Device: None (Room air)        Physical Exam  Constitutional: Awake, alert, well appearing  Head: Normocephalic and atraumatic.   No tenderness of the facial bones.    Nose: Nose normal.   Eyes: EOM are normal. Pupils are equal, round, and reactive to light.   Anterior chambers clear bilaterally.  No periorbital changes.    Neck: Normal range of motion. Neck supple. No JVD present.  No bruising/abrasions.  No hematomas.  Normal voice.    Cardiovascular: Normal rate and regular rhythm.    Pulmonary/Chest: Effort normal and breath sounds normal. No stridor.  No tenderness of the chest wall.  There is no crepitus.  Abdominal: Soft. There is no tenderness. There is no guarding. No ecchymosis/abrasions.      Musculoskeletal: There is no swelling, deformity or bony tenderness in any 4 of the patient's extremities.    No midline cervical, thoracic, lumbar back pain.    Neurological: Patient is awake alert oriented to person but not place time or situation.  She moves all 4 extremities to command and denies any sensation deficits.  Good strength all 4 extremities  No slurred speech.  She does try and participate in conversation and tries to answer questions appropriately.    Skin: Skin is warm and dry.       ED Course   Labs Reviewed - No data to display             CT BRAIN OR HEAD (CPT=70450)    Result Date: 2/6/2025            PROCEDURE:  CT BRAIN OR HEAD (08894)  COMPARISON:  [facility] , CT, CT BRAIN OR HEAD (30332), 2/03/2024, 6:45 PM.  INDICATIONS:  fal  TECHNIQUE:  Noncontrast CT scanning is performed through the brain. Dose reduction techniques were used. Dose information is transmitted to the ACR (American College of Radiology) NRDR (National Radiology  Data Registry) which includes the Dose Index Registry.  PATIENT STATED HISTORY: (As transcribed by Technologist)  fall.     FINDINGS: No evidence of intracranial hemorrhage or extra-axial fluid collection. Lucencies in the deep periventricular white matter are likely sequelae of chronic small vessel ischemic disease. Prominence of the sulci is noted. Mild dilatation of the lateral and 3rd ventricles.  Volume loss in the anterior aspect of the temporal lobes is noted. Visualized portions of paranasal sinuses are unremarkable. Visualized portions of the mastoid air cells are unremarkable. Visualized portions of the orbits are unremarkable. IMPRESSION: No evidence of an acute intracranial abnormality.  No significant change since prior exam.    LOCATION:  [facility]   Dictated by (CST): Cholo Dickey MD on 2/06/2025 at 9:01 PM     Finalized by (CST): Cholo Dickey MD on 2/06/2025 at 9:02 PM              MDM            Differential diagnoses considered: Life-threatening intracranial hemorrhage, skull fracture, C-spine injury    -C-spine injury not clinically suggested    -CT brain negative      I visualized the radiology studies, my independent interpretation: No large intracranial hemorrhage noted on CT scan of brain      *Additional sources of history: history obtained from EMS and chart, patient has a history dementia notable provide history              Medical Decision Making      Disposition and Plan     Clinical Impression:  1. Closed head injury, initial encounter         Disposition:  Discharge  2/6/2025  9:25 pm    Follow-up:  Dorothy Owens MD  [address] [facility] [id number]  [phone]    Follow up            Medications Prescribed:  Discharge Medication List as of 2/6/2025  9:29 PM              Supplementary Documentation:

## 2025-02-10 ENCOUNTER — LAB REQUISITION (OUTPATIENT)
Dept: LAB | Facility: HOSPITAL | Age: 85
End: 2025-02-10
Payer: MEDICARE

## 2025-02-10 DIAGNOSIS — M62.81 MUSCLE WEAKNESS (GENERALIZED): ICD-10-CM

## 2025-02-10 LAB
ALBUMIN SERPL-MCNC: 3.8 G/DL (ref 3.2–4.8)
ALBUMIN/GLOB SERPL: 1.4 {RATIO} (ref 1–2)
ALP LIVER SERPL-CCNC: 118 U/L
ALT SERPL-CCNC: 19 U/L
ANION GAP SERPL CALC-SCNC: 8 MMOL/L (ref 0–18)
AST SERPL-CCNC: 16 U/L (ref ?–34)
BASOPHILS # BLD AUTO: 0.04 X10(3) UL (ref 0–0.2)
BASOPHILS NFR BLD AUTO: 0.6 %
BILIRUB SERPL-MCNC: 0.3 MG/DL (ref 0.2–1.1)
BUN BLD-MCNC: 17 MG/DL (ref 9–23)
CALCIUM BLD-MCNC: 10.2 MG/DL (ref 8.7–10.6)
CHLORIDE SERPL-SCNC: 108 MMOL/L (ref 98–112)
CO2 SERPL-SCNC: 27 MMOL/L (ref 21–32)
CREAT BLD-MCNC: 0.96 MG/DL
EGFRCR SERPLBLD CKD-EPI 2021: 59 ML/MIN/1.73M2 (ref 60–?)
EOSINOPHIL # BLD AUTO: 0.15 X10(3) UL (ref 0–0.7)
EOSINOPHIL NFR BLD AUTO: 2.4 %
ERYTHROCYTE [DISTWIDTH] IN BLOOD BY AUTOMATED COUNT: 13.2 %
FASTING STATUS PATIENT QL REPORTED: YES
GLOBULIN PLAS-MCNC: 2.7 G/DL (ref 2–3.5)
GLUCOSE BLD-MCNC: 83 MG/DL (ref 70–99)
HCT VFR BLD AUTO: 40.8 %
HGB BLD-MCNC: 12.9 G/DL
IMM GRANULOCYTES # BLD AUTO: 0.02 X10(3) UL (ref 0–1)
IMM GRANULOCYTES NFR BLD: 0.3 %
LYMPHOCYTES # BLD AUTO: 2.68 X10(3) UL (ref 1–4)
LYMPHOCYTES NFR BLD AUTO: 42.3 %
MCH RBC QN AUTO: 29 PG (ref 26–34)
MCHC RBC AUTO-ENTMCNC: 31.6 G/DL (ref 31–37)
MCV RBC AUTO: 91.7 FL
MONOCYTES # BLD AUTO: 0.52 X10(3) UL (ref 0.1–1)
MONOCYTES NFR BLD AUTO: 8.2 %
NEUTROPHILS # BLD AUTO: 2.92 X10 (3) UL (ref 1.5–7.7)
NEUTROPHILS # BLD AUTO: 2.92 X10(3) UL (ref 1.5–7.7)
NEUTROPHILS NFR BLD AUTO: 46.2 %
OSMOLALITY SERPL CALC.SUM OF ELEC: 297 MOSM/KG (ref 275–295)
PLATELET # BLD AUTO: 253 10(3)UL (ref 150–450)
POTASSIUM SERPL-SCNC: 4.2 MMOL/L (ref 3.5–5.1)
PROT SERPL-MCNC: 6.5 G/DL (ref 5.7–8.2)
RBC # BLD AUTO: 4.45 X10(6)UL
SODIUM SERPL-SCNC: 143 MMOL/L (ref 136–145)
WBC # BLD AUTO: 6.3 X10(3) UL (ref 4–11)

## 2025-02-10 PROCEDURE — 85025 COMPLETE CBC W/AUTO DIFF WBC: CPT | Performed by: HOSPITALIST

## 2025-02-10 PROCEDURE — 80053 COMPREHEN METABOLIC PANEL: CPT | Performed by: HOSPITALIST

## 2025-03-10 ENCOUNTER — LAB REQUISITION (OUTPATIENT)
Dept: LAB | Facility: HOSPITAL | Age: 85
End: 2025-03-10
Payer: MEDICARE

## 2025-03-10 DIAGNOSIS — M62.81 MUSCLE WEAKNESS (GENERALIZED): ICD-10-CM

## 2025-03-10 LAB
ALBUMIN SERPL-MCNC: 4.1 G/DL (ref 3.2–4.8)
ALBUMIN/GLOB SERPL: 1.6 {RATIO} (ref 1–2)
ALP LIVER SERPL-CCNC: 102 U/L
ALT SERPL-CCNC: 12 U/L
ANION GAP SERPL CALC-SCNC: 9 MMOL/L (ref 0–18)
AST SERPL-CCNC: 14 U/L (ref ?–34)
BASOPHILS # BLD AUTO: 0.04 X10(3) UL (ref 0–0.2)
BASOPHILS NFR BLD AUTO: 0.6 %
BILIRUB SERPL-MCNC: 0.3 MG/DL (ref 0.2–1.1)
BUN BLD-MCNC: 14 MG/DL (ref 9–23)
CALCIUM BLD-MCNC: 9.8 MG/DL (ref 8.7–10.6)
CHLORIDE SERPL-SCNC: 108 MMOL/L (ref 98–112)
CO2 SERPL-SCNC: 28 MMOL/L (ref 21–32)
CREAT BLD-MCNC: 0.98 MG/DL
EGFRCR SERPLBLD CKD-EPI 2021: 58 ML/MIN/1.73M2 (ref 60–?)
EOSINOPHIL # BLD AUTO: 0.12 X10(3) UL (ref 0–0.7)
EOSINOPHIL NFR BLD AUTO: 1.7 %
ERYTHROCYTE [DISTWIDTH] IN BLOOD BY AUTOMATED COUNT: 13.2 %
FASTING STATUS PATIENT QL REPORTED: YES
GLOBULIN PLAS-MCNC: 2.5 G/DL (ref 2–3.5)
GLUCOSE BLD-MCNC: 88 MG/DL (ref 70–99)
HCT VFR BLD AUTO: 40.3 %
HGB BLD-MCNC: 13.2 G/DL
IMM GRANULOCYTES # BLD AUTO: 0.02 X10(3) UL (ref 0–1)
IMM GRANULOCYTES NFR BLD: 0.3 %
LYMPHOCYTES # BLD AUTO: 2.64 X10(3) UL (ref 1–4)
LYMPHOCYTES NFR BLD AUTO: 37.4 %
MCH RBC QN AUTO: 29.1 PG (ref 26–34)
MCHC RBC AUTO-ENTMCNC: 32.8 G/DL (ref 31–37)
MCV RBC AUTO: 89 FL
MONOCYTES # BLD AUTO: 0.67 X10(3) UL (ref 0.1–1)
MONOCYTES NFR BLD AUTO: 9.5 %
NEUTROPHILS # BLD AUTO: 3.56 X10 (3) UL (ref 1.5–7.7)
NEUTROPHILS # BLD AUTO: 3.56 X10(3) UL (ref 1.5–7.7)
NEUTROPHILS NFR BLD AUTO: 50.5 %
OSMOLALITY SERPL CALC.SUM OF ELEC: 300 MOSM/KG (ref 275–295)
PLATELET # BLD AUTO: 227 10(3)UL (ref 150–450)
POTASSIUM SERPL-SCNC: 3.4 MMOL/L (ref 3.5–5.1)
PROT SERPL-MCNC: 6.6 G/DL (ref 5.7–8.2)
RBC # BLD AUTO: 4.53 X10(6)UL
SODIUM SERPL-SCNC: 145 MMOL/L (ref 136–145)
WBC # BLD AUTO: 7.1 X10(3) UL (ref 4–11)

## 2025-03-10 PROCEDURE — 80053 COMPREHEN METABOLIC PANEL: CPT | Performed by: HOSPITALIST

## 2025-03-10 PROCEDURE — 85025 COMPLETE CBC W/AUTO DIFF WBC: CPT | Performed by: HOSPITALIST

## 2025-03-17 ENCOUNTER — LAB REQUISITION (OUTPATIENT)
Dept: LAB | Facility: HOSPITAL | Age: 85
End: 2025-03-17
Payer: MEDICARE

## 2025-03-17 DIAGNOSIS — M62.81 MUSCLE WEAKNESS (GENERALIZED): ICD-10-CM

## 2025-03-17 DIAGNOSIS — E87.6 HYPOKALEMIA: ICD-10-CM

## 2025-03-17 DIAGNOSIS — I10 ESSENTIAL (PRIMARY) HYPERTENSION: ICD-10-CM

## 2025-03-17 LAB
ALBUMIN SERPL-MCNC: 4.1 G/DL (ref 3.2–4.8)
ALBUMIN/GLOB SERPL: 1.7 {RATIO} (ref 1–2)
ALP LIVER SERPL-CCNC: 102 U/L
ALT SERPL-CCNC: 10 U/L
ANION GAP SERPL CALC-SCNC: 4 MMOL/L (ref 0–18)
AST SERPL-CCNC: 13 U/L (ref ?–34)
BASOPHILS # BLD AUTO: 0.04 X10(3) UL (ref 0–0.2)
BASOPHILS NFR BLD AUTO: 0.6 %
BILIRUB SERPL-MCNC: 0.3 MG/DL (ref 0.2–1.1)
BUN BLD-MCNC: 17 MG/DL (ref 9–23)
CALCIUM BLD-MCNC: 10.4 MG/DL (ref 8.7–10.6)
CHLORIDE SERPL-SCNC: 109 MMOL/L (ref 98–112)
CO2 SERPL-SCNC: 31 MMOL/L (ref 21–32)
CREAT BLD-MCNC: 1.08 MG/DL
EGFRCR SERPLBLD CKD-EPI 2021: 51 ML/MIN/1.73M2 (ref 60–?)
EOSINOPHIL # BLD AUTO: 0.13 X10(3) UL (ref 0–0.7)
EOSINOPHIL NFR BLD AUTO: 1.9 %
ERYTHROCYTE [DISTWIDTH] IN BLOOD BY AUTOMATED COUNT: 13.8 %
FASTING STATUS PATIENT QL REPORTED: YES
GLOBULIN PLAS-MCNC: 2.4 G/DL (ref 2–3.5)
GLUCOSE BLD-MCNC: 84 MG/DL (ref 70–99)
HCT VFR BLD AUTO: 42.4 %
HGB BLD-MCNC: 13.7 G/DL
IMM GRANULOCYTES # BLD AUTO: 0.02 X10(3) UL (ref 0–1)
IMM GRANULOCYTES NFR BLD: 0.3 %
LYMPHOCYTES # BLD AUTO: 2.79 X10(3) UL (ref 1–4)
LYMPHOCYTES NFR BLD AUTO: 41.8 %
MCH RBC QN AUTO: 29.1 PG (ref 26–34)
MCHC RBC AUTO-ENTMCNC: 32.3 G/DL (ref 31–37)
MCV RBC AUTO: 90 FL
MONOCYTES # BLD AUTO: 0.71 X10(3) UL (ref 0.1–1)
MONOCYTES NFR BLD AUTO: 10.6 %
NEUTROPHILS # BLD AUTO: 2.99 X10 (3) UL (ref 1.5–7.7)
NEUTROPHILS # BLD AUTO: 2.99 X10(3) UL (ref 1.5–7.7)
NEUTROPHILS NFR BLD AUTO: 44.8 %
OSMOLALITY SERPL CALC.SUM OF ELEC: 299 MOSM/KG (ref 275–295)
PLATELET # BLD AUTO: 229 10(3)UL (ref 150–450)
POTASSIUM SERPL-SCNC: 4.1 MMOL/L (ref 3.5–5.1)
PROT SERPL-MCNC: 6.5 G/DL (ref 5.7–8.2)
RBC # BLD AUTO: 4.71 X10(6)UL
SODIUM SERPL-SCNC: 144 MMOL/L (ref 136–145)
WBC # BLD AUTO: 6.7 X10(3) UL (ref 4–11)

## 2025-03-17 PROCEDURE — 80053 COMPREHEN METABOLIC PANEL: CPT | Performed by: HOSPITALIST

## 2025-03-17 PROCEDURE — 85025 COMPLETE CBC W/AUTO DIFF WBC: CPT | Performed by: HOSPITALIST

## 2025-04-14 ENCOUNTER — LAB REQUISITION (OUTPATIENT)
Dept: LAB | Facility: HOSPITAL | Age: 85
End: 2025-04-14
Payer: MEDICARE

## 2025-04-14 DIAGNOSIS — M62.81 MUSCLE WEAKNESS (GENERALIZED): ICD-10-CM

## 2025-04-14 LAB
ALBUMIN SERPL-MCNC: 4 G/DL (ref 3.2–4.8)
ALBUMIN/GLOB SERPL: 1.7 {RATIO} (ref 1–2)
ALP LIVER SERPL-CCNC: 92 U/L (ref 55–142)
ALT SERPL-CCNC: 12 U/L (ref 10–49)
ANION GAP SERPL CALC-SCNC: 10 MMOL/L (ref 0–18)
AST SERPL-CCNC: 15 U/L (ref ?–34)
BASOPHILS # BLD AUTO: 0.05 X10(3) UL (ref 0–0.2)
BASOPHILS NFR BLD AUTO: 0.8 %
BILIRUB SERPL-MCNC: 0.3 MG/DL (ref 0.2–1.1)
BUN BLD-MCNC: 22 MG/DL (ref 9–23)
CALCIUM BLD-MCNC: 10.2 MG/DL (ref 8.7–10.6)
CHLORIDE SERPL-SCNC: 113 MMOL/L (ref 98–112)
CO2 SERPL-SCNC: 23 MMOL/L (ref 21–32)
CREAT BLD-MCNC: 1.09 MG/DL (ref 0.55–1.02)
EGFRCR SERPLBLD CKD-EPI 2021: 51 ML/MIN/1.73M2 (ref 60–?)
EOSINOPHIL # BLD AUTO: 0.11 X10(3) UL (ref 0–0.7)
EOSINOPHIL NFR BLD AUTO: 1.8 %
ERYTHROCYTE [DISTWIDTH] IN BLOOD BY AUTOMATED COUNT: 13.7 %
FASTING STATUS PATIENT QL REPORTED: YES
GLOBULIN PLAS-MCNC: 2.4 G/DL (ref 2–3.5)
GLUCOSE BLD-MCNC: 83 MG/DL (ref 70–99)
HCT VFR BLD AUTO: 41.5 % (ref 35–48)
HGB BLD-MCNC: 13.3 G/DL (ref 12–16)
IMM GRANULOCYTES # BLD AUTO: 0.02 X10(3) UL (ref 0–1)
IMM GRANULOCYTES NFR BLD: 0.3 %
LYMPHOCYTES # BLD AUTO: 2.61 X10(3) UL (ref 1–4)
LYMPHOCYTES NFR BLD AUTO: 41.7 %
MCH RBC QN AUTO: 28.3 PG (ref 26–34)
MCHC RBC AUTO-ENTMCNC: 32 G/DL (ref 31–37)
MCV RBC AUTO: 88.3 FL (ref 80–100)
MONOCYTES # BLD AUTO: 0.69 X10(3) UL (ref 0.1–1)
MONOCYTES NFR BLD AUTO: 11 %
NEUTROPHILS # BLD AUTO: 2.78 X10 (3) UL (ref 1.5–7.7)
NEUTROPHILS # BLD AUTO: 2.78 X10(3) UL (ref 1.5–7.7)
NEUTROPHILS NFR BLD AUTO: 44.4 %
OSMOLALITY SERPL CALC.SUM OF ELEC: 304 MOSM/KG (ref 275–295)
PLATELET # BLD AUTO: 202 10(3)UL (ref 150–450)
POTASSIUM SERPL-SCNC: 3.9 MMOL/L (ref 3.5–5.1)
PROT SERPL-MCNC: 6.4 G/DL (ref 5.7–8.2)
RBC # BLD AUTO: 4.7 X10(6)UL (ref 3.8–5.3)
SODIUM SERPL-SCNC: 146 MMOL/L (ref 136–145)
WBC # BLD AUTO: 6.3 X10(3) UL (ref 4–11)

## 2025-04-14 PROCEDURE — 80053 COMPREHEN METABOLIC PANEL: CPT | Performed by: HOSPITALIST

## 2025-04-14 PROCEDURE — 85025 COMPLETE CBC W/AUTO DIFF WBC: CPT | Performed by: HOSPITALIST

## 2025-07-09 ENCOUNTER — LAB REQUISITION (OUTPATIENT)
Dept: LAB | Facility: HOSPITAL | Age: 85
End: 2025-07-09

## 2025-07-09 DIAGNOSIS — Z91.81 HISTORY OF FALLING: ICD-10-CM

## 2025-07-09 DIAGNOSIS — M62.81 MUSCLE WEAKNESS (GENERALIZED): ICD-10-CM

## 2025-07-09 LAB
ALBUMIN SERPL-MCNC: 3.8 G/DL (ref 3.2–4.8)
ALBUMIN/GLOB SERPL: 1.6 (ref 1–2)
ALP LIVER SERPL-CCNC: 96 U/L (ref 55–142)
ALT SERPL-CCNC: 15 U/L (ref 10–49)
ANION GAP SERPL CALC-SCNC: 6 MMOL/L (ref 0–18)
AST SERPL-CCNC: 16 U/L (ref ?–34)
BASOPHILS # BLD AUTO: 0.04 X10(3) UL (ref 0–0.2)
BASOPHILS NFR BLD AUTO: 0.6 %
BILIRUB SERPL-MCNC: 0.3 MG/DL (ref 0.2–1.1)
BUN BLD-MCNC: 21 MG/DL (ref 9–23)
CALCIUM BLD-MCNC: 10.1 MG/DL (ref 8.7–10.6)
CHLORIDE SERPL-SCNC: 109 MMOL/L (ref 98–112)
CO2 SERPL-SCNC: 29 MMOL/L (ref 21–32)
CREAT BLD-MCNC: 1.16 MG/DL (ref 0.55–1.02)
EGFRCR SERPLBLD CKD-EPI 2021: 47 ML/MIN/1.73M2 (ref 60–?)
EOSINOPHIL # BLD AUTO: 0.13 X10(3) UL (ref 0–0.7)
EOSINOPHIL NFR BLD AUTO: 1.9 %
ERYTHROCYTE [DISTWIDTH] IN BLOOD BY AUTOMATED COUNT: 13.7 %
FASTING STATUS PATIENT QL REPORTED: YES
GLOBULIN PLAS-MCNC: 2.4 G/DL (ref 2–3.5)
GLUCOSE BLD-MCNC: 89 MG/DL (ref 70–99)
HCT VFR BLD AUTO: 39.3 % (ref 35–48)
HGB BLD-MCNC: 13.1 G/DL (ref 12–16)
IMM GRANULOCYTES # BLD AUTO: 0.04 X10(3) UL (ref 0–1)
IMM GRANULOCYTES NFR BLD: 0.6 %
LYMPHOCYTES # BLD AUTO: 2.9 X10(3) UL (ref 1–4)
LYMPHOCYTES NFR BLD AUTO: 41.7 %
MCH RBC QN AUTO: 29.6 PG (ref 26–34)
MCHC RBC AUTO-ENTMCNC: 33.3 G/DL (ref 31–37)
MCV RBC AUTO: 88.9 FL (ref 80–100)
MONOCYTES # BLD AUTO: 0.7 X10(3) UL (ref 0.1–1)
MONOCYTES NFR BLD AUTO: 10.1 %
NEUTROPHILS # BLD AUTO: 3.15 X10 (3) UL (ref 1.5–7.7)
NEUTROPHILS # BLD AUTO: 3.15 X10(3) UL (ref 1.5–7.7)
NEUTROPHILS NFR BLD AUTO: 45.1 %
OSMOLALITY SERPL CALC.SUM OF ELEC: 300 MOSM/KG (ref 275–295)
PLATELET # BLD AUTO: 188 10(3)UL (ref 150–450)
POTASSIUM SERPL-SCNC: 3.9 MMOL/L (ref 3.5–5.1)
PROT SERPL-MCNC: 6.2 G/DL (ref 5.7–8.2)
RBC # BLD AUTO: 4.42 X10(6)UL (ref 3.8–5.3)
SODIUM SERPL-SCNC: 144 MMOL/L (ref 136–145)
WBC # BLD AUTO: 7 X10(3) UL (ref 4–11)

## 2025-07-09 PROCEDURE — 85025 COMPLETE CBC W/AUTO DIFF WBC: CPT | Performed by: HOSPITALIST

## 2025-07-09 PROCEDURE — 80053 COMPREHEN METABOLIC PANEL: CPT | Performed by: HOSPITALIST

## 2025-07-14 ENCOUNTER — LAB REQUISITION (OUTPATIENT)
Dept: LAB | Facility: HOSPITAL | Age: 85
End: 2025-07-14

## 2025-07-14 DIAGNOSIS — F03.C3: ICD-10-CM

## 2025-07-14 LAB
ALBUMIN SERPL-MCNC: 3.9 G/DL (ref 3.2–4.8)
ALBUMIN/GLOB SERPL: 1.6 (ref 1–2)
ALP LIVER SERPL-CCNC: 96 U/L (ref 55–142)
ALT SERPL-CCNC: 16 U/L (ref 10–49)
ANION GAP SERPL CALC-SCNC: 11 MMOL/L (ref 0–18)
AST SERPL-CCNC: 18 U/L (ref ?–34)
BASOPHILS # BLD AUTO: 0.04 X10(3) UL (ref 0–0.2)
BASOPHILS NFR BLD AUTO: 0.6 %
BILIRUB SERPL-MCNC: 0.3 MG/DL (ref 0.2–1.1)
BUN BLD-MCNC: 14 MG/DL (ref 9–23)
CALCIUM BLD-MCNC: 10.1 MG/DL (ref 8.7–10.6)
CHLORIDE SERPL-SCNC: 108 MMOL/L (ref 98–112)
CO2 SERPL-SCNC: 25 MMOL/L (ref 21–32)
CREAT BLD-MCNC: 1.06 MG/DL (ref 0.55–1.02)
EGFRCR SERPLBLD CKD-EPI 2021: 52 ML/MIN/1.73M2 (ref 60–?)
EOSINOPHIL # BLD AUTO: 0.15 X10(3) UL (ref 0–0.7)
EOSINOPHIL NFR BLD AUTO: 2.2 %
ERYTHROCYTE [DISTWIDTH] IN BLOOD BY AUTOMATED COUNT: 13.7 %
GLOBULIN PLAS-MCNC: 2.5 G/DL (ref 2–3.5)
GLUCOSE BLD-MCNC: 84 MG/DL (ref 70–99)
HCT VFR BLD AUTO: 41.7 % (ref 35–48)
HGB BLD-MCNC: 13.6 G/DL (ref 12–16)
IMM GRANULOCYTES # BLD AUTO: 0.02 X10(3) UL (ref 0–1)
IMM GRANULOCYTES NFR BLD: 0.3 %
LYMPHOCYTES # BLD AUTO: 2.63 X10(3) UL (ref 1–4)
LYMPHOCYTES NFR BLD AUTO: 38.7 %
MCH RBC QN AUTO: 29.2 PG (ref 26–34)
MCHC RBC AUTO-ENTMCNC: 32.6 G/DL (ref 31–37)
MCV RBC AUTO: 89.5 FL (ref 80–100)
MONOCYTES # BLD AUTO: 0.62 X10(3) UL (ref 0.1–1)
MONOCYTES NFR BLD AUTO: 9.1 %
NEUTROPHILS # BLD AUTO: 3.34 X10 (3) UL (ref 1.5–7.7)
NEUTROPHILS # BLD AUTO: 3.34 X10(3) UL (ref 1.5–7.7)
NEUTROPHILS NFR BLD AUTO: 49.1 %
OSMOLALITY SERPL CALC.SUM OF ELEC: 298 MOSM/KG (ref 275–295)
PLATELET # BLD AUTO: 208 10(3)UL (ref 150–450)
POTASSIUM SERPL-SCNC: 4.1 MMOL/L (ref 3.5–5.1)
PROT SERPL-MCNC: 6.4 G/DL (ref 5.7–8.2)
RBC # BLD AUTO: 4.66 X10(6)UL (ref 3.8–5.3)
SODIUM SERPL-SCNC: 144 MMOL/L (ref 136–145)
WBC # BLD AUTO: 6.8 X10(3) UL (ref 4–11)

## 2025-07-14 PROCEDURE — 80053 COMPREHEN METABOLIC PANEL: CPT | Performed by: HOSPITALIST

## 2025-07-14 PROCEDURE — 85025 COMPLETE CBC W/AUTO DIFF WBC: CPT | Performed by: HOSPITALIST

## (undated) DIAGNOSIS — I10 ESSENTIAL HYPERTENSION WITH GOAL BLOOD PRESSURE LESS THAN 140/90: ICD-10-CM

## (undated) DIAGNOSIS — F02.80 EARLY ONSET ALZHEIMER'S DEMENTIA WITHOUT BEHAVIORAL DISTURBANCE (HCC): ICD-10-CM

## (undated) DIAGNOSIS — S09.90XA INJURY OF HEAD, INITIAL ENCOUNTER: Primary | ICD-10-CM

## (undated) DIAGNOSIS — G30.0 EARLY ONSET ALZHEIMER'S DEMENTIA WITHOUT BEHAVIORAL DISTURBANCE (HCC): ICD-10-CM

## (undated) DIAGNOSIS — S00.03XA CONTUSION OF SCALP, INITIAL ENCOUNTER: ICD-10-CM

## (undated) DIAGNOSIS — S01.80XA OPEN FACIAL WOUND, INITIAL ENCOUNTER: Primary | ICD-10-CM

## (undated) DEVICE — Device

## (undated) DEVICE — SUTURE VCRL SZ 1 L18IN ABSRB UD L36MM CT-1

## (undated) DEVICE — BANDAGE COHESIVE W4INXL5YD TAN E POR SLF ADH

## (undated) DEVICE — GLOVE SUR 6.5 SENSICARE PI PIP GRN PWD F

## (undated) DEVICE — COBRA TOOTH GRASPER TIP, DISPOSABLE: Brand: RENEW

## (undated) DEVICE — EVACUATOR SUR 100CC SIL BLB WND

## (undated) DEVICE — SRG GLV PROTEXIS BLUE 7.5

## (undated) DEVICE — HUNTER GASPER TIP, DISPOSABLE: Brand: RENEW

## (undated) DEVICE — LASSO POLYPECTOMY SNARE: Brand: LASSO

## (undated) DEVICE — PILLOW ABD M W15XH6XL22IN LEG RASPBERRY FOAM

## (undated) DEVICE — SUT ETHLN 3-0 18IN PS-1 NABSRB BLK 24MM 3/8 C

## (undated) DEVICE — SOLUTION IRRIG 1000ML 0.9% NACL USP BTL

## (undated) DEVICE — SINGLE USE DISTAL COVER MAJ-2315: Brand: SINGLE USE DISTAL COVER

## (undated) DEVICE — 3M™ RED DOT™ MONITORING ELECTRODE WITH FOAM TAPE AND STICKY GEL, 50/BAG, 20/CASE, 72/PLT 2570: Brand: RED DOT™

## (undated) DEVICE — TROCAR: Brand: KII SHIELDED BLADED ACCESS SYSTEM

## (undated) DEVICE — SUTURE FIBERWIRE SZ 5-0 L38IN NONABSORBABLE

## (undated) DEVICE — PAD SACRAL SPAN AID

## (undated) DEVICE — DRAIN SUR 19FR L0.25IN 3/4 FLUT 3/16IN TRCR

## (undated) DEVICE — PACK PBDS TOTAL HIP MODULE

## (undated) DEVICE — SNAPLOC WIRE GUIDE LOCKING DEVICE: Brand: SNAPLOC

## (undated) DEVICE — ACROBAT 2 CALIBRATED TIP WIRE GUIDE: Brand: ACROBAT

## (undated) DEVICE — HOOD STERI-SHIELD 408-800-000

## (undated) DEVICE — DRESSING TRNSPAR W2.375XL4IN IS HYPOALRG

## (undated) DEVICE — BITEBLOCK ENDOSCP 60FR MAXI STRP

## (undated) DEVICE — KIT CUSTOM ENDOPROCEDURE STERIS

## (undated) DEVICE — SLEEVE COMPR M KNEE LEN SGL USE KENDALL SCD

## (undated) DEVICE — TUBING MEGADYNE SPECULUM

## (undated) DEVICE — SUTURE MCRYL SZ 3-0 L27IN ABSRB UD L19MM PS-2

## (undated) DEVICE — LAPAROVUE VISIBILITY SYSTEM LAPAROSCOPIC SOLUTIONS: Brand: LAPAROVUE

## (undated) DEVICE — DALE ABDOMINAL BINDER, 12" WIDE, STRETCHES TO FIT 30"-45", 1 PER BOX.: Brand: DALE ABDOMINAL BINDER

## (undated) DEVICE — REM POLYHESIVE ADULT PATIENT RETURN ELECTRODE: Brand: VALLEYLAB

## (undated) DEVICE — 10FT COMBINED O2 DELIVERY/CO2 MONITORING. FILTER WITH MICROSTREAM TYPE LUER: Brand: DUAL ADULT NASAL CANNULA

## (undated) DEVICE — CLIP INT M L POLYMR LOK LIG HEM O LOK

## (undated) DEVICE — GLOVE SUR 6.5 SENSICARE PI PIP CRM PWD F

## (undated) DEVICE — #15 STERILE STAINLESS BLADE: Brand: STERILE STAINLESS BLADES

## (undated) DEVICE — L-HOOK CAUTERY PROBE TIP, DISPOSABLE: Brand: RENEW

## (undated) DEVICE — SUT COAT VCRL+ 0 27IN UR-6 ABSRB VLT ANTIBACT

## (undated) DEVICE — SUT COAT VCRL + 0 54IN ABSRB UD ANTIBACT

## (undated) DEVICE — HOOD STERI-SHIELD 408-800-100

## (undated) DEVICE — TOWEL SURG SM W12XL18IN CLR PLAS TEAR RESIST

## (undated) DEVICE — RETRIEVAL BALLOON CATHETER: Brand: EXTRACTOR™ PRO RX

## (undated) DEVICE — 1200CC GUARDIAN II: Brand: GUARDIAN

## (undated) DEVICE — LIGHT HANDLE

## (undated) DEVICE — STRIP SKIN CLSR W12XL100MM MICROPOROUS NWVN

## (undated) DEVICE — SYRINGE MED 10ML LL TIP W/O SFTY DISP

## (undated) DEVICE — TROCAR: Brand: KII FIOS FIRST ENTRY

## (undated) DEVICE — CANNULATING SPHINCTEROTOME: Brand: AUTOTOME™ RX 44

## (undated) DEVICE — ABSORBABLE HEMOSTAT (OXIDIZED REGENERATED CELLULOSE): Brand: SURGICEL NU-KNIT

## (undated) DEVICE — SUTURE MCRYL SZ 2-0 L27IN ABSRB UD SH L26MM

## (undated) DEVICE — ADHESIVE SKIN TOP FOR WND CLSR DERMBND ADV

## (undated) DEVICE — PENCIL TELESCOPE MEGADYNE SE

## (undated) DEVICE — KIT VLV 5 PC AIR H2O SUCT BX ENDOGATOR CONN

## (undated) DEVICE — SURGICAL GLOVE PI ORTHO 7.5

## (undated) DEVICE — V2 SPECIMEN COLLECTION MANIFOLD KIT: Brand: NEPTUNE

## (undated) DEVICE — SOLUTION IRRIG 3000ML 0.9% NACL FLX CONT

## (undated) DEVICE — SUT MCRYL 4-0 18IN PS-2 ABSRB UD 19MM 3/8 CIR

## (undated) DEVICE — ENDOPATH 5MM ENDOSCOPIC BLUNT TIP DISSECTORS (12 POUCHES CONTAINING 3 DISSECTORS EACH): Brand: ENDOPATH

## (undated) DEVICE — LAP CHOLE/APPY CDS-LF: Brand: MEDLINE INDUSTRIES, INC.

## (undated) DEVICE — 40580 - THE PINK PAD - ADVANCED TRENDELENBURG POSITIONING KIT: Brand: 40580 - THE PINK PAD - ADVANCED TRENDELENBURG POSITIONING KIT

## (undated) DEVICE — TRADITIONAL MARYLAND DISSECTOR TIP, DISPOSABLE: Brand: RENEW

## (undated) DEVICE — ABSORBABLE HEMOSTAT (OXIDIZED REGENERATED CELLULOSE, U.S.P.): Brand: SURGICEL FIBRILLAR

## (undated) DEVICE — GLOVE SUR 7 PROTEXIS PI PIP CRM PWD F

## (undated) DEVICE — GRABBER GRASPER TIP, DISPOSABLE: Brand: RENEW

## (undated) DEVICE — SLEEVE COMPR MD KNEE LEN SGL USE KENDALL SCD

## (undated) DEVICE — OASIS, ONE ACTION STENT INTRODUCTION SYSTEM: Brand: OASIS

## (undated) DEVICE — TROCAR: Brand: KII® SLEEVE

## (undated) DEVICE — CLIP APPLIER WITH CLIP LOGIC TECHNOLOGY: Brand: ENDO CLIP III

## (undated) DEVICE — POUCH SPECIMEN WIRE 6X3 250ML

## (undated) DEVICE — PDS II VLT 0 107CM AG ST3: Brand: ENDOLOOP

## (undated) DEVICE — COVER,LIGHT,CAMERA,HARD,1/PK,STRL: Brand: MEDLINE

## (undated) DEVICE — ENDOPATH ULTRA VERESS INSUFFLATION NEEDLES WITH LUER LOCK CONNECTORS: Brand: ENDOPATH

## (undated) DEVICE — CONTAINER SPEC 4OZ POLYPR GRAD LEAK RESIST

## (undated) DEVICE — Device: Brand: SUTURE PASSOR PRO

## (undated) DEVICE — MINI ENDOCUT SCISSOR TIP, DISPOSABLE: Brand: RENEW

## (undated) NOTE — LETTER
[address]  [address]  Authorization for Surgical Operation and Procedure   Date:___________                                                                                                         Time:__________  I hereby authorize Martínez Blakely MD, my physician and his/her assistants (if applicable), which may include medical students, residents, and/or fellows, to perform the following surgical operation/ procedure and administer such anesthesia as may be determined necessary by my physician:  Operation/Procedure name (s) ENDOSCOPIC RETROGRADE CHOLANGIOPANCREATOGRAPHY on Mar Hernandez   2.   I recognize that during the surgical operation/procedure, unforeseen conditions may necessitate additional or different procedures than those listed above.  I, therefore, further authorize and request that the above-named surgeon, assistants, or designees perform such procedures as are, in their judgment, necessary and desirable.    3.   My surgeon/physician has discussed prior to my surgery the potential benefits, risks and side effects of this procedure; the likelihood of achieving goals; and potential problems that might occur during recuperation.  They also discussed reasonable alternatives to the procedure, including risks, benefits, and side effects related to the alternatives and risks related to not receiving this procedure.  I have had all my questions answered and I acknowledge that no guarantee has been made as to the result that may be obtained.    4.   Should the need arise during my operation or immediate post-operative period, I also consent to the administration of blood and/or blood products.  Further, I understand that despite careful testing and screening of blood or blood products by collecting agencies, I may still be subject to ill effects as  a result of receiving a blood transfusion and/or blood products.  The following are some, but not all, of the potential risks that can occur: fever and allergic reactions, hemolytic reactions, transmission of diseases such as Hepatitis, AIDS and Cytomegalovirus (CMV) and fluid overload.  In the event that I wish to have an autologous transfusion of my own blood, or a directed donor transfusion.  I will discuss this with my physician.   5.   I authorize the use of any specimen, organs, tissues, body parts or foreign objects that may be removed from my body during the operation/procedure for diagnosis, research or teaching purposes and their subsequent disposal by hospital authorities.  I also authorize the release of specimen test results and/or written reports to my treating physician on the hospital medical staff or other referring or consulting physicians involved in my care, at the discretion of the Pathologist or my treating physician.    6.   I consent to the photographing or videotaping of the operations or procedures to be performed, including appropriate portions of my body for medical, scientific, or educational purposes, provided my identity is not revealed by the pictures or by descriptive texts accompanying them.  If the procedure has been photographed/videotaped, the surgeon will obtain the original picture, image, videotape or CD.  The hospital will not be responsible for storage, release or maintenance of the picture, image, tape or CD.    7.   I consent to the presence of a  or observers in the operating room as deemed necessary by my physician or their designees.    8.   I recognize that in the event my procedure results in extended X-Ray/fluoroscopy time, I may develop a skin reaction.    9. If I have a Do Not Attempt Resuscitation (DNAR) order in place, that status will be suspended while in the operating room, procedural suite, and during the recovery period unless otherwise  explicitly stated by me (or a person authorized to consent on my behalf). The surgeon or my attending physician will determine when the applicable recovery period ends for purposes of reinstating the DNAR order.  10. Patients having a sterilization procedure: I understand that if the procedure is successful the results will be permanent and it will therefore be impossible for me to inseminate, conceive, or bear children.  I also understand that the procedure is intended to result in sterility, although the result has not been guaranteed.   11. I acknowledge that my physician has explained sedation/analgesia administration to me including the risk and benefits I consent to the administration of sedation/analgesia as may be necessary or desirable in the judgment of my physician.    I CERTIFY THAT I HAVE READ AND FULLY UNDERSTAND THE ABOVE CONSENT TO OPERATION and/or OTHER PROCEDURE.      _________________________________________  __________________________________  Signature of Patient     Signature of Responsible Person         ___________________________________         Printed Name of Responsible Person           _________________________________                 Relationship to Patient  _________________________________________  ______________________________  Signature of Witness          Date  Time    Patient Name: Mar Hernandez     : 1940                 Printed: 2024     Medical Record #: [id number]                     Page 1 of 1

## (undated) NOTE — Clinical Note
[address]  [address]  Authorization for Surgical Operation and Procedure     Date:___________                                                                                                         Time:__________  1. I hereby authorize Surgeon(s):  Sumaya Garcia MD, my physician and his/her assistants (if applicable), which may include medical students, residents, and/or fellows, to perform the following surgical operation/ procedure and administer such anesthesia as may be determined necessary by my physician:  Operation/Procedure name (s) Procedure(s):  LAPAROSCOPIC CHOLECYSTECTOMY WITH INTRAOPERATIVE CHOLANGIOGRAM on Mar Hernandez   2.   I recognize that during the surgical operation/procedure, unforeseen conditions may necessitate additional or different procedures than those listed above.  I, therefore, further authorize and request that the above-named surgeon, assistants, or designees perform such procedures as are, in their judgment, necessary and desirable.    3.   My surgeon/physician has discussed prior to my surgery the potential benefits, risks and side effects of this procedure; the likelihood of achieving goals; and potential problems that might occur during recuperation.  They also discussed reasonable alternatives to the procedure, including risks, benefits, and side effects related to the alternatives and risks related to not receiving this procedure.  I have had all my questions answered and I acknowledge that no guarantee has been made as to the result that may be obtained.    4.   Should the need arise during my operation/procedure, which includes change of level of care prior to discharge, I also consent to the administration of blood and/or blood products.  Further, I understand that despite careful testing and screening of blood or blood products by collecting agencies, I may still be subject to ill effects as a result of receiving a blood  transfusion and/or blood products.  The following are some, but not all, of the potential risks that can occur: fever and allergic reactions, hemolytic reactions, transmission of diseases such as Hepatitis, AIDS and Cytomegalovirus (CMV) and fluid overload.  In the event that I wish to have an autologous transfusion of my own blood, or a directed donor transfusion, I will discuss this with my physician.  Check only if Refusing Blood or Blood Products  I understand refusal of blood or blood products as deemed necessary by my physician may have serious consequences to my condition to include possible death. I hereby assume responsibility for my refusal and release the hospital, its personnel, and my physicians from any responsibility for the consequences of my refusal.          o  Refuse      5.   I authorize the use of any specimen, organs, tissues, body parts or foreign objects that may be removed from my body during the operation/procedure for diagnosis, research or teaching purposes and their subsequent disposal by hospital authorities.  I also authorize the release of specimen test results and/or written reports to my treating physician on the hospital medical staff or other referring or consulting physicians involved in my care, at the discretion of the Pathologist or my treating physician.    6.   I consent to the photographing or videotaping of the operations or procedures to be performed, including appropriate portions of my body for medical, scientific, or educational purposes, provided my identity is not revealed by the pictures or by descriptive texts accompanying them.  If the procedure has been photographed/videotaped, the surgeon will obtain the original picture, image, videotape or CD.  The hospital will not be responsible for storage, release or maintenance of the picture, image, tape or CD.    7.   I consent to the presence of a  or observers in the operating room as deemed  necessary by my physician or their designees.    8.   I recognize that in the event my procedure results in extended X-Ray/fluoroscopy time, I may develop a skin reaction.    9. If I have a Do Not Attempt Resuscitation (DNAR) order in place, that status will be suspended while in the operating room, procedural suite, and during the recovery period unless otherwise explicitly stated by me (or a person authorized to consent on my behalf). The surgeon or my attending physician will determine when the applicable recovery period ends for purposes of reinstating the DNAR order.  10. Patients having a sterilization procedure: I understand that if the procedure is successful the results will be permanent and it will therefore be impossible for me to inseminate, conceive, or bear children.  I also understand that the procedure is intended to result in sterility, although the result has not been guaranteed.   11. I acknowledge that my physician has explained sedation/analgesia administration to me including the risk and benefits I consent to the administration of sedation/analgesia as may be necessary or desirable in the judgment of my physician.    I CERTIFY THAT I HAVE READ AND FULLY UNDERSTAND THE ABOVE CONSENT TO OPERATION and/or OTHER PROCEDURE.    _________________________________________  __________________________________  Signature of Patient     Signature of Responsible Person         ___________________________________         Printed Name of Responsible Person           _________________________________                 Relationship to Patient  _________________________________________  ______________________________  Signature of Witness          Date  Time      Patient Name: Mar Hernandez     : 1940                 Printed: November 15, 2024     Medical Record #: [id number]                     Page 1 of 2                                  [address]   [address]    Consent for Anesthesia    1. IMar agree to be cared for by an anesthesiologist, who is specially trained to monitor me and give me medicine to put me to sleep or keep me comfortable during my procedure    I understand that my anesthesiologist is not an employee or agent of [facility] or [employer]. He or she works for [employer].    2. As the patient asking for anesthesia services, I agree to:  a. Allow the anesthesiologist (anesthesia doctor) to give me medicine and do additional procedures as necessary. Some examples are: Starting or using an “IV” to give me medicine, fluids or blood during my procedure, and having a breathing tube placed to help me breathe when I’m asleep (intubation). In the event that my heart stops working properly, I understand that my anesthesiologist will make every effort to sustain my life, unless otherwise directed by [facility] Do Not Resuscitate documents.  b. Tell my anesthesia doctor before my procedure:  i. If I am pregnant.  ii. The last time that I ate or drank.  iii. All of the medicines I take (including prescriptions, herbal supplements, and pills I can buy without a prescription (including street drugs/illegal medications). Failure to inform my anesthesiologist about these medicines may increase my risk of anesthetic complications.  iv. If I am allergic to anything or have had a reaction to anesthesia before.  3. I understand how the anesthesia medicine will help me (benefits).  4. I understand that with any type of anesthesia medicine there are risks:  a. The most common risks are: nausea, vomiting, sore throat, muscle soreness, damage to my eyes, mouth, or teeth (from breathing tube placement).  b. Rare risks include: remembering what happened during my procedure, allergic reactions to medications, injury to my airway, heart, lungs, vision, nerves, or muscles and in extremely rare instances death.  5. My doctor  has explained to me other choices available to me for my care (alternatives).  6. Pregnant Patients (“epidural”):  I understand that the risks of having an epidural (medicine given into my back to help control pain during labor), include itching, low blood pressure, difficulty urinating, headache or slowing of the baby’s heart. Very rare risks include infection, bleeding, seizure, irregular heart rhythms and nerve injury.  7. Regional Anesthesia (“spinal”, “epidural”, & “nerve blocks”):  I understand that rare but potential complications include headache, bleeding, infection, seizure, irregular heart rhythms, and nerve injury.    I can change my mind about having anesthesia services at any time before I get the medicine.    _____________________________________________________________________________  Patient (or Representative) Signature/Relationship to Patient  Date   Time    _____________________________________________________________________________   Name (if used)    Language/Organization   Time    _____________________________________________________________________________  Anesthesiologist Signature     Date   Time  I have discussed the procedure and information above with the patient (or patient’s representative) and answered their questions. The patient or their representative has agreed to have anesthesia services.    _____________________________________________________________________________  Witness        Date   Time  I have verified that the signature is that of the patient or patient’s representative, and that it was signed before the procedure  Patient Name: Mar Hernandez     : 1940                 Printed: November 15, 2024     Medical Record #: [id number]                     Page 2 of 2

## (undated) NOTE — MR AVS SNAPSHOT
[address] [address] [employer]  [address] [place] [address] [place] [address] [phone]               Thank you for choosing us for your health care visit with Stacey Cates MD.  We are glad to serve you and happy to provide you with this Take by mouth. One po BID           Donepezil HCl 5 MG Tabs   Take 5 mg by mouth nightly. Commonly known as:  ARICEPT           FLORASTOR 250 MG Caps   Generic drug:  saccharomyces boulardii   Take 250 mg by mouth daily.            Levothyroxine Sodium 75

## (undated) NOTE — Clinical Note
[address]  [address]  Authorization for Surgical Operation and Procedure     Date:___________                                                                                                         Time:__________  1. I hereby authorize Surgeon(s):  Sumaya Garcia MD, my physician and his/her assistants (if applicable), which may include medical students, residents, and/or fellows, to perform the following surgical operation/ procedure and administer such anesthesia as may be determined necessary by my physician:  Operation/Procedure name (s) Procedure(s):  LAPAROSCOPIC CHOLECYSTECTOMY WITH INTRAOPERATIVE CHOLANGIOGRAM on Mar Hernandez   2.   I recognize that during the surgical operation/procedure, unforeseen conditions may necessitate additional or different procedures than those listed above.  I, therefore, further authorize and request that the above-named surgeon, assistants, or designees perform such procedures as are, in their judgment, necessary and desirable.    3.   My surgeon/physician has discussed prior to my surgery the potential benefits, risks and side effects of this procedure; the likelihood of achieving goals; and potential problems that might occur during recuperation.  They also discussed reasonable alternatives to the procedure, including risks, benefits, and side effects related to the alternatives and risks related to not receiving this procedure.  I have had all my questions answered and I acknowledge that no guarantee has been made as to the result that may be obtained.    4.   Should the need arise during my operation/procedure, which includes change of level of care prior to discharge, I also consent to the administration of blood and/or blood products.  Further, I understand that despite careful testing and screening of blood or blood products by collecting agencies, I may still be subject to ill effects as a result of receiving a blood  transfusion and/or blood products.  The following are some, but not all, of the potential risks that can occur: fever and allergic reactions, hemolytic reactions, transmission of diseases such as Hepatitis, AIDS and Cytomegalovirus (CMV) and fluid overload.  In the event that I wish to have an autologous transfusion of my own blood, or a directed donor transfusion, I will discuss this with my physician.  Check only if Refusing Blood or Blood Products  I understand refusal of blood or blood products as deemed necessary by my physician may have serious consequences to my condition to include possible death. I hereby assume responsibility for my refusal and release the hospital, its personnel, and my physicians from any responsibility for the consequences of my refusal.          o  Refuse      5.   I authorize the use of any specimen, organs, tissues, body parts or foreign objects that may be removed from my body during the operation/procedure for diagnosis, research or teaching purposes and their subsequent disposal by hospital authorities.  I also authorize the release of specimen test results and/or written reports to my treating physician on the hospital medical staff or other referring or consulting physicians involved in my care, at the discretion of the Pathologist or my treating physician.    6.   I consent to the photographing or videotaping of the operations or procedures to be performed, including appropriate portions of my body for medical, scientific, or educational purposes, provided my identity is not revealed by the pictures or by descriptive texts accompanying them.  If the procedure has been photographed/videotaped, the surgeon will obtain the original picture, image, videotape or CD.  The hospital will not be responsible for storage, release or maintenance of the picture, image, tape or CD.    7.   I consent to the presence of a  or observers in the operating room as deemed  necessary by my physician or their designees.    8.   I recognize that in the event my procedure results in extended X-Ray/fluoroscopy time, I may develop a skin reaction.    9. If I have a Do Not Attempt Resuscitation (DNAR) order in place, that status will be suspended while in the operating room, procedural suite, and during the recovery period unless otherwise explicitly stated by me (or a person authorized to consent on my behalf). The surgeon or my attending physician will determine when the applicable recovery period ends for purposes of reinstating the DNAR order.  10. Patients having a sterilization procedure: I understand that if the procedure is successful the results will be permanent and it will therefore be impossible for me to inseminate, conceive, or bear children.  I also understand that the procedure is intended to result in sterility, although the result has not been guaranteed.   11. I acknowledge that my physician has explained sedation/analgesia administration to me including the risk and benefits I consent to the administration of sedation/analgesia as may be necessary or desirable in the judgment of my physician.    I CERTIFY THAT I HAVE READ AND FULLY UNDERSTAND THE ABOVE CONSENT TO OPERATION and/or OTHER PROCEDURE.    _________________________________________  __________________________________  Signature of Patient     Signature of Responsible Person         ___________________________________         Printed Name of Responsible Person           _________________________________                 Relationship to Patient  _________________________________________  ______________________________  Signature of Witness          Date  Time      Patient Name: Mar Hernandez     : 1940                 Printed: November 15, 2024     Medical Record #: [id number]                     Page 1 of 2                                  [address]   [address]    Consent for Anesthesia    1. IMar agree to be cared for by an anesthesiologist, who is specially trained to monitor me and give me medicine to put me to sleep or keep me comfortable during my procedure    I understand that my anesthesiologist is not an employee or agent of [facility] or [employer]. He or she works for [employer].    2. As the patient asking for anesthesia services, I agree to:  a. Allow the anesthesiologist (anesthesia doctor) to give me medicine and do additional procedures as necessary. Some examples are: Starting or using an “IV” to give me medicine, fluids or blood during my procedure, and having a breathing tube placed to help me breathe when I’m asleep (intubation). In the event that my heart stops working properly, I understand that my anesthesiologist will make every effort to sustain my life, unless otherwise directed by [facility] Do Not Resuscitate documents.  b. Tell my anesthesia doctor before my procedure:  i. If I am pregnant.  ii. The last time that I ate or drank.  iii. All of the medicines I take (including prescriptions, herbal supplements, and pills I can buy without a prescription (including street drugs/illegal medications). Failure to inform my anesthesiologist about these medicines may increase my risk of anesthetic complications.  iv. If I am allergic to anything or have had a reaction to anesthesia before.  3. I understand how the anesthesia medicine will help me (benefits).  4. I understand that with any type of anesthesia medicine there are risks:  a. The most common risks are: nausea, vomiting, sore throat, muscle soreness, damage to my eyes, mouth, or teeth (from breathing tube placement).  b. Rare risks include: remembering what happened during my procedure, allergic reactions to medications, injury to my airway, heart, lungs, vision, nerves, or muscles and in extremely rare instances death.  5. My doctor  has explained to me other choices available to me for my care (alternatives).  6. Pregnant Patients (“epidural”):  I understand that the risks of having an epidural (medicine given into my back to help control pain during labor), include itching, low blood pressure, difficulty urinating, headache or slowing of the baby’s heart. Very rare risks include infection, bleeding, seizure, irregular heart rhythms and nerve injury.  7. Regional Anesthesia (“spinal”, “epidural”, & “nerve blocks”):  I understand that rare but potential complications include headache, bleeding, infection, seizure, irregular heart rhythms, and nerve injury.    I can change my mind about having anesthesia services at any time before I get the medicine.    _____________________________________________________________________________  Patient (or Representative) Signature/Relationship to Patient  Date   Time    _____________________________________________________________________________   Name (if used)    Language/Organization   Time    _____________________________________________________________________________  Anesthesiologist Signature     Date   Time  I have discussed the procedure and information above with the patient (or patient’s representative) and answered their questions. The patient or their representative has agreed to have anesthesia services.    _____________________________________________________________________________  Witness        Date   Time  I have verified that the signature is that of the patient or patient’s representative, and that it was signed before the procedure  Patient Name: Mar Hernandez     : 1940                 Printed: November 15, 2024     Medical Record #: [id number]                     Page 2 of 2

## (undated) NOTE — LETTER
[address]  [address]  Authorization for Surgical Operation and Procedure   Date:___________                                                                                                         Time:__________  I hereby authorizeSumaya Garcia MD, my physician and his/her assistants (if applicable), which may include medical students, residents, and/or fellows, to perform the following surgical operation/ procedure and administer such anesthesia as may be determined necessary by my physician:  Operation/Procedure name (s) Procedure(s):  LAPAROSCOPIC CHOLECYSTECTOMY WITH INTRAOPERATIVE CHOLANGIOGRAM on Marissa Hernandez   2.   I recognize that during the surgical operation/procedure, unforeseen conditions may necessitate additional or different procedures than those listed above.  I, therefore, further authorize and request that the above-named surgeon, assistants, or designees perform such procedures as are, in their judgment, necessary and desirable.    3.   My surgeon/physician has discussed prior to my surgery the potential benefits, risks and side effects of this procedure; the likelihood of achieving goals; and potential problems that might occur during recuperation.  They also discussed reasonable alternatives to the procedure, including risks, benefits, and side effects related to the alternatives and risks related to not receiving this procedure.  I have had all my questions answered and I acknowledge that no guarantee has been made as to the result that may be obtained.    4.   Should the need arise during my operation or immediate post-operative period, I also consent to the administration of blood and/or blood products.  Further, I understand that despite careful testing and screening of blood or blood products by collecting agencies, I may  still be subject to ill effects as a result of receiving a blood transfusion and/or blood products.  The following are some, but not all, of the potential risks that can occur: fever and allergic reactions, hemolytic reactions, transmission of diseases such as Hepatitis, AIDS and Cytomegalovirus (CMV) and fluid overload.  In the event that I wish to have an autologous transfusion of my own blood, or a directed donor transfusion.  I will discuss this with my physician.   5.   I authorize the use of any specimen, organs, tissues, body parts or foreign objects that may be removed from my body during the operation/procedure for diagnosis, research or teaching purposes and their subsequent disposal by hospital authorities.  I also authorize the release of specimen test results and/or written reports to my treating physician on the hospital medical staff or other referring or consulting physicians involved in my care, at the discretion of the Pathologist or my treating physician.    6.   I consent to the photographing or videotaping of the operations or procedures to be performed, including appropriate portions of my body for medical, scientific, or educational purposes, provided my identity is not revealed by the pictures or by descriptive texts accompanying them.  If the procedure has been photographed/videotaped, the surgeon will obtain the original picture, image, videotape or CD.  The hospital will not be responsible for storage, release or maintenance of the picture, image, tape or CD.    7.   I consent to the presence of a  or observers in the operating room as deemed necessary by my physician or their designees.    8.   I recognize that in the event my procedure results in extended X-Ray/fluoroscopy time, I may develop a skin reaction.    9. If I have a Do Not Attempt Resuscitation (DNAR) order in place, that status will be suspended while in the operating room, procedural suite, and during the  recovery period unless otherwise explicitly stated by me (or a person authorized to consent on my behalf). The surgeon or my attending physician will determine when the applicable recovery period ends for purposes of reinstating the DNAR order.  10. Patients having a sterilization procedure: I understand that if the procedure is successful the results will be permanent and it will therefore be impossible for me to inseminate, conceive, or bear children.  I also understand that the procedure is intended to result in sterility, although the result has not been guaranteed.   11. I acknowledge that my physician has explained sedation/analgesia administration to me including the risk and benefits I consent to the administration of sedation/analgesia as may be necessary or desirable in the judgment of my physician.    I CERTIFY THAT I HAVE READ AND FULLY UNDERSTAND THE ABOVE CONSENT TO OPERATION and/or OTHER PROCEDURE.      _________________________________________  __________________________________  Signature of Patient     Signature of Responsible Person         ___________________________________         Printed Name of Responsible Person           _________________________________                 Relationship to Patient  _________________________________________  ______________________________  Signature of Witness          Date  Time    Patient Name: Mar Hernandez     : 1940                 Printed: 2024     Medical Record #: [id number]                     Page 1 of 1

## (undated) NOTE — MR AVS SNAPSHOT
[facility]  [address] [place] Feroz Chavez [address] [place] [id number] [phone]               Thank you for choosing us for your health care visit with Bryson Montanez MD.  We are glad to serve you and happy to provide you with this Commonly known as:  PRINIVIL,ZESTRIL           Loperamide HCl 2 MG Caps   Take 1 capsule (2 mg total) by mouth 4 (four) times daily as needed for Diarrhea.    Commonly known as:  IMODIUM           MetFORMIN HCl 500 MG Tabs   Take 1 tablet (500 mg total) by

## (undated) NOTE — LETTER
October 28, 2019    Jacquelyn Tinoco  [address]  [facility] [address]      Dear Esther Gil: The following are the results of your recent tests. Please review the list of test results.   Your result is the value on the left; we have also supplied

## (undated) NOTE — MR AVS SNAPSHOT
[address] [facility]  [address] [place] Chavez [address] [place] [id number] [phone]               Thank you for choosing us for your health care visit with Adam Shah MD.  We are glad to serve you and happy to provide you with this Assoc Dx:  IFG (impaired fasting glucose) [R73.01]           PHYSICAL THERAPY FALL RISK - INTERNAL    Complete by:  As directed    Assoc Dx:  Risk for falls [Z91.81]                 Follow-up Instructions     Return in about 3 months (around 8/9/2017). Tamie Nagy, [address] [facility], [address] [place], [place] [address]: [phone]    [address]:  [phone]      Assoc Dx:  Risk for falls [Z91.81]          Reason for Today's Visit     Physical-Other     Follow - Up     Lab           Medical Issues Discussed T ? Always use hand rails on stairs and escalators. ? Cover porch steps with gritty weather proof paint. ? Pay attention to curbs and other changes in surfaces when out in the community. ? Take care when walking on gravel or grassy surfaces. ?  Avoid walk Abdominal aortic aneurysm screening (once between ages 73-68) IPPE only No results found for this or any previous visit.  Limited to patients who meet one of the following criteria:   • Men who are 73-68 years old and have smoked more than 100 cigarettes i Recommend Annually to at least age 76, and as needed after 76 Mammogram,1 Yr due on 07/14/1982 Please get this Mammogram regularly   Immunizations      Influenza  Covered Annually   Orders placed or performed in visit on 10/06/16  -FLU VACC PRSV FREE INC A [email or web address] org  This link also has information from the [address] [place] [address] regarding Advance Directives.        Allergies as of May 09, 2017     No Known Allergies                Today's Vital Signs     BP Pulse Temp Height Weight BMI [email or web address]. If you've recently had a stay at the Hospital you can access your discharge instructions in [place] by going to Visits < Admission Summaries.  If you've been to the Emergency Department or your doctor's office, you can view yo ? Avoid walking on snowy or icy surfaces. ? Use a cane or walker (indoors and out) if you are unsteady on your feet.              Visit [facility] online at  [email or web address]

## (undated) NOTE — LETTER
February 21, 2018    [address]      Dear Lizzy Monae: The following are the results of your recent tests. Please review the list of test results.   Your result is the value on the left; we have also supplied the

## (undated) NOTE — LETTER
[address]  [address]  Authorization for Surgical Operation and Procedure     Date:___________                                                                                                         Time:__________  I hereby authorize Surgeon(s):  Galo Heck MD, my physician and his/her assistants (if applicable), which may include medical students, residents, and/or fellows, to perform the following surgical operation/ procedure and administer such anesthesia as may be determined necessary by my physician:  Operation/Procedure name (s) Procedure(s):  LEFT HIP HEMIARTHROPLASTY/ BIPOLAR on Mar Hernandez   2.   I recognize that during the surgical operation/procedure, unforeseen conditions may necessitate additional or different procedures than those listed above.  I, therefore, further authorize and request that the above-named surgeon, assistants, or designees perform such procedures as are, in their judgment, necessary and desirable.    3.   My surgeon/physician has discussed prior to my surgery the potential benefits, risks and side effects of this procedure; the likelihood of achieving goals; and potential problems that might occur during recuperation.  They also discussed reasonable alternatives to the procedure, including risks, benefits, and side effects related to the alternatives and risks related to not receiving this procedure.  I have had all my questions answered and I acknowledge that no guarantee has been made as to the result that may be obtained.    4.   Should the need arise during my operation/procedure, which includes change of level of care prior to discharge, I also consent to the administration of blood and/or blood products.  Further, I understand that despite careful testing and screening of blood or blood products by collecting agencies, I may still be subject to ill effects as a result of receiving a blood transfusion and/or blood products.   The following are some, but not all, of the potential risks that can occur: fever and allergic reactions, hemolytic reactions, transmission of diseases such as Hepatitis, AIDS and Cytomegalovirus (CMV) and fluid overload.  In the event that I wish to have an autologous transfusion of my own blood, or a directed donor transfusion, I will discuss this with my physician.  Check only if Refusing Blood or Blood Products  I understand refusal of blood or blood products as deemed necessary by my physician may have serious consequences to my condition to include possible death. I hereby assume responsibility for my refusal and release the hospital, its personnel, and my physicians from any responsibility for the consequences of my refusal.          o  Refuse      5.   I authorize the use of any specimen, organs, tissues, body parts or foreign objects that may be removed from my body during the operation/procedure for diagnosis, research or teaching purposes and their subsequent disposal by hospital authorities.  I also authorize the release of specimen test results and/or written reports to my treating physician on the hospital medical staff or other referring or consulting physicians involved in my care, at the discretion of the Pathologist or my treating physician.    6.   I consent to the photographing or videotaping of the operations or procedures to be performed, including appropriate portions of my body for medical, scientific, or educational purposes, provided my identity is not revealed by the pictures or by descriptive texts accompanying them.  If the procedure has been photographed/videotaped, the surgeon will obtain the original picture, image, videotape or CD.  The hospital will not be responsible for storage, release or maintenance of the picture, image, tape or CD.    7.   I consent to the presence of a  or observers in the operating room as deemed necessary by my physician or their  designees.    8.   I recognize that in the event my procedure results in extended X-Ray/fluoroscopy time, I may develop a skin reaction.    9. If I have a Do Not Attempt Resuscitation (DNAR) order in place, that status will be suspended while in the operating room, procedural suite, and during the recovery period unless otherwise explicitly stated by me (or a person authorized to consent on my behalf). The surgeon or my attending physician will determine when the applicable recovery period ends for purposes of reinstating the DNAR order.  10. Patients having a sterilization procedure: I understand that if the procedure is successful the results will be permanent and it will therefore be impossible for me to inseminate, conceive, or bear children.  I also understand that the procedure is intended to result in sterility, although the result has not been guaranteed.   11. I acknowledge that my physician has explained sedation/analgesia administration to me including the risk and benefits I consent to the administration of sedation/analgesia as may be necessary or desirable in the judgment of my physician.    I CERTIFY THAT I HAVE READ AND FULLY UNDERSTAND THE ABOVE CONSENT TO OPERATION and/or OTHER PROCEDURE.    _________________________________________  __________________________________  Signature of Patient     Signature of Responsible Person         ___________________________________         Printed Name of Responsible Person           _________________________________                 Relationship to Patient  _________________________________________  ______________________________  Signature of Witness          Date  Time      Patient Name: Mar Hernandez     : 1940                 Printed: 2024     Medical Record #: [id number]                     Page 1 of [address] [place] [address]  [id number]    Consent for Anesthesia    IMar  David agree to be cared for by an anesthesiologist, who is specially trained to monitor me and give me medicine to put me to sleep or keep me comfortable during my procedure    I understand that my anesthesiologist is not an employee or agent of [facility] or [employer]. He or she works for [employer].    As the patient asking for anesthesia services, I agree to:  Allow the anesthesiologist (anesthesia doctor) to give me medicine and do additional procedures as necessary. Some examples are: Starting or using an “IV” to give me medicine, fluids or blood during my procedure, and having a breathing tube placed to help me breathe when I’m asleep (intubation). In the event that my heart stops working properly, I understand that my anesthesiologist will make every effort to sustain my life, unless otherwise directed by [facility] Do Not Resuscitate documents.  Tell my anesthesia doctor before my procedure:  If I am pregnant.  The last time that I ate or drank.  All of the medicines I take (including prescriptions, herbal supplements, and pills I can buy without a prescription (including street drugs/illegal medications). Failure to inform my anesthesiologist about these medicines may increase my risk of anesthetic complications.  If I am allergic to anything or have had a reaction to anesthesia before.  I understand how the anesthesia medicine will help me (benefits).  I understand that with any type of anesthesia medicine there are risks:  The most common risks are: nausea, vomiting, sore throat, muscle soreness, damage to my eyes, mouth, or teeth (from breathing tube placement).  Rare risks include: remembering what happened during my procedure, allergic reactions to medications, injury to my airway, heart, lungs, vision, nerves, or muscles and in extremely rare instances death.  My doctor has explained to me other choices available to me for my care (alternatives).  Pregnant  Patients (“epidural”):  I understand that the risks of having an epidural (medicine given into my back to help control pain during labor), include itching, low blood pressure, difficulty urinating, headache or slowing of the baby’s heart. Very rare risks include infection, bleeding, seizure, irregular heart rhythms and nerve injury.  Regional Anesthesia (“spinal”, “epidural”, & “nerve blocks”):  I understand that rare but potential complications include headache, bleeding, infection, seizure, irregular heart rhythms, and nerve injury.    I can change my mind about having anesthesia services at any time before I get the medicine.    _____________________________________________________________________________  Patient (or Representative) Signature/Relationship to Patient  Date   Time    _____________________________________________________________________________   Name (if used)    Language/Organization   Time    _____________________________________________________________________________  Anesthesiologist Signature     Date   Time  I have discussed the procedure and information above with the patient (or patient’s representative) and answered their questions. The patient or their representative has agreed to have anesthesia services.    _____________________________________________________________________________  Witness        Date   Time  I have verified that the signature is that of the patient or patient’s representative, and that it was signed before the procedure  Patient Name: Mar Hernandez     : 1940                 Printed: 2024     Medical Record #: [id number]                     Page 2 of 2

## (undated) NOTE — LETTER
[address]  [address]  Authorization for Surgical Operation and Procedure     Date:___________                                                                                                         Time:__________  I hereby authorize Surgeon(s):  Brandon Roman MD Hutcherson, Lori, MD, my physician and his/her assistants (if applicable), which may include medical students, residents, and/or fellows, to perform the following surgical operation/ procedure and administer such anesthesia as may be determined necessary by my physician:  Operation/Procedure name (s) Procedure(s):  LAPAROSCOPIC CHOLECYSTECTOMY WITH INTRAOPERATIVE CHOLANGIOGRAM on Mar Hernandez   2.   I recognize that during the surgical operation/procedure, unforeseen conditions may necessitate additional or different procedures than those listed above.  I, therefore, further authorize and request that the above-named surgeon, assistants, or designees perform such procedures as are, in their judgment, necessary and desirable.    3.   My surgeon/physician has discussed prior to my surgery the potential benefits, risks and side effects of this procedure; the likelihood of achieving goals; and potential problems that might occur during recuperation.  They also discussed reasonable alternatives to the procedure, including risks, benefits, and side effects related to the alternatives and risks related to not receiving this procedure.  I have had all my questions answered and I acknowledge that no guarantee has been made as to the result that may be obtained.    4.   Should the need arise during my operation/procedure, which includes change of level of care prior to discharge, I also consent to the administration of blood and/or blood products.  Further, I understand that despite careful testing and screening of blood or blood products by collecting agencies, I may still be subject to ill effects as a result of  receiving a blood transfusion and/or blood products.  The following are some, but not all, of the potential risks that can occur: fever and allergic reactions, hemolytic reactions, transmission of diseases such as Hepatitis, AIDS and Cytomegalovirus (CMV) and fluid overload.  In the event that I wish to have an autologous transfusion of my own blood, or a directed donor transfusion, I will discuss this with my physician.  Check only if Refusing Blood or Blood Products  I understand refusal of blood or blood products as deemed necessary by my physician may have serious consequences to my condition to include possible death. I hereby assume responsibility for my refusal and release the hospital, its personnel, and my physicians from any responsibility for the consequences of my refusal.          o  Refuse      5.   I authorize the use of any specimen, organs, tissues, body parts or foreign objects that may be removed from my body during the operation/procedure for diagnosis, research or teaching purposes and their subsequent disposal by hospital authorities.  I also authorize the release of specimen test results and/or written reports to my treating physician on the hospital medical staff or other referring or consulting physicians involved in my care, at the discretion of the Pathologist or my treating physician.    6.   I consent to the photographing or videotaping of the operations or procedures to be performed, including appropriate portions of my body for medical, scientific, or educational purposes, provided my identity is not revealed by the pictures or by descriptive texts accompanying them.  If the procedure has been photographed/videotaped, the surgeon will obtain the original picture, image, videotape or CD.  The hospital will not be responsible for storage, release or maintenance of the picture, image, tape or CD.    7.   I consent to the presence of a  or observers in the operating room  as deemed necessary by my physician or their designees.    8.   I recognize that in the event my procedure results in extended X-Ray/fluoroscopy time, I may develop a skin reaction.    9. If I have a Do Not Attempt Resuscitation (DNAR) order in place, that status will be suspended while in the operating room, procedural suite, and during the recovery period unless otherwise explicitly stated by me (or a person authorized to consent on my behalf). The surgeon or my attending physician will determine when the applicable recovery period ends for purposes of reinstating the DNAR order.  10. Patients having a sterilization procedure: I understand that if the procedure is successful the results will be permanent and it will therefore be impossible for me to inseminate, conceive, or bear children.  I also understand that the procedure is intended to result in sterility, although the result has not been guaranteed.   11. I acknowledge that my physician has explained sedation/analgesia administration to me including the risk and benefits I consent to the administration of sedation/analgesia as may be necessary or desirable in the judgment of my physician.    I CERTIFY THAT I HAVE READ AND FULLY UNDERSTAND THE ABOVE CONSENT TO OPERATION and/or OTHER PROCEDURE.    _________________________________________  __________________________________  Signature of Patient     Signature of Responsible Person         ___________________________________         Printed Name of Responsible Person           _________________________________                 Relationship to Patient  _________________________________________  ______________________________  Signature of Witness          Date  Time      Patient Name: Mar Hernandez     : 1940                 Printed: 2024     Medical Record #: [id number]                     Page 2 of [address] [place] [address]   [id number]    Consent for Anesthesia    IMar agree to be cared for by an anesthesiologist, who is specially trained to monitor me and give me medicine to put me to sleep or keep me comfortable during my procedure    I understand that my anesthesiologist is not an employee or agent of [facility] or [employer]. He or she works for [employer].    As the patient asking for anesthesia services, I agree to:  Allow the anesthesiologist (anesthesia doctor) to give me medicine and do additional procedures as necessary. Some examples are: Starting or using an “IV” to give me medicine, fluids or blood during my procedure, and having a breathing tube placed to help me breathe when I’m asleep (intubation). In the event that my heart stops working properly, I understand that my anesthesiologist will make every effort to sustain my life, unless otherwise directed by [facility] Do Not Resuscitate documents.  Tell my anesthesia doctor before my procedure:  If I am pregnant.  The last time that I ate or drank.  All of the medicines I take (including prescriptions, herbal supplements, and pills I can buy without a prescription (including street drugs/illegal medications). Failure to inform my anesthesiologist about these medicines may increase my risk of anesthetic complications.  If I am allergic to anything or have had a reaction to anesthesia before.  I understand how the anesthesia medicine will help me (benefits).  I understand that with any type of anesthesia medicine there are risks:  The most common risks are: nausea, vomiting, sore throat, muscle soreness, damage to my eyes, mouth, or teeth (from breathing tube placement).  Rare risks include: remembering what happened during my procedure, allergic reactions to medications, injury to my airway, heart, lungs, vision, nerves, or muscles and in extremely rare instances death.  My doctor has explained to me other choices  available to me for my care (alternatives).  Pregnant Patients (“epidural”):  I understand that the risks of having an epidural (medicine given into my back to help control pain during labor), include itching, low blood pressure, difficulty urinating, headache or slowing of the baby’s heart. Very rare risks include infection, bleeding, seizure, irregular heart rhythms and nerve injury.  Regional Anesthesia (“spinal”, “epidural”, & “nerve blocks”):  I understand that rare but potential complications include headache, bleeding, infection, seizure, irregular heart rhythms, and nerve injury.    I can change my mind about having anesthesia services at any time before I get the medicine.    _____________________________________________________________________________  Patient (or Representative) Signature/Relationship to Patient  Date   Time    _____________________________________________________________________________   Name (if used)    Language/Organization   Time    _____________________________________________________________________________  Anesthesiologist Signature     Date   Time  I have discussed the procedure and information above with the patient (or patient’s representative) and answered their questions. The patient or their representative has agreed to have anesthesia services.    _____________________________________________________________________________  Witness        Date   Time  I have verified that the signature is that of the patient or patient’s representative, and that it was signed before the procedure  Patient Name: Mar Hernandez     : 1940                 Printed: 2024     Medical Record #: [id number]                     Page 3 of 3

## (undated) NOTE — Clinical Note
May 10, 2017    Ryan Amara  [address]  [place] [address]      Dear Tobi Astorga: The following are the results of your recent tests. Please review the list of test results.   Your result is the value on the left; we have also supplied t

## (undated) NOTE — LETTER
[address]  [address]  Authorization for Surgical Operation and Procedure     Date:___________                                                                                                         Time:__________  I hereby authorize HAI KING DO, my physician and his/her assistants (if applicable), which may include medical students, residents, and/or fellows, to perform the following surgical operation/ procedure and administer such anesthesia as may be determined necessary by my physician:  Operation/Procedure name (s) ENDOSCOPIC RETROGRADE CHOLANGIOPANCREATOGRAPHY WITH POSSIBLE STENT PLACEMENT on Mar Hernandez   2.   I recognize that during the surgical operation/procedure, unforeseen conditions may necessitate additional or different procedures than those listed above.  I, therefore, further authorize and request that the above-named surgeon, assistants, or designees perform such procedures as are, in their judgment, necessary and desirable.    3.   My surgeon/physician has discussed prior to my surgery the potential benefits, risks and side effects of this procedure; the likelihood of achieving goals; and potential problems that might occur during recuperation.  They also discussed reasonable alternatives to the procedure, including risks, benefits, and side effects related to the alternatives and risks related to not receiving this procedure.  I have had all my questions answered and I acknowledge that no guarantee has been made as to the result that may be obtained.    4.   Should the need arise during my operation/procedure, which includes change of level of care prior to discharge, I also consent to the administration of blood and/or blood products.  Further, I understand that despite careful testing and screening of blood or blood products by collecting agencies, I may still be subject to ill effects as a result of receiving a blood transfusion and/or  blood products.  The following are some, but not all, of the potential risks that can occur: fever and allergic reactions, hemolytic reactions, transmission of diseases such as Hepatitis, AIDS and Cytomegalovirus (CMV) and fluid overload.  In the event that I wish to have an autologous transfusion of my own blood, or a directed donor transfusion, I will discuss this with my physician.  Check only if Refusing Blood or Blood Products  I understand refusal of blood or blood products as deemed necessary by my physician may have serious consequences to my condition to include possible death. I hereby assume responsibility for my refusal and release the hospital, its personnel, and my physicians from any responsibility for the consequences of my refusal.          o  Refuse      5.   I authorize the use of any specimen, organs, tissues, body parts or foreign objects that may be removed from my body during the operation/procedure for diagnosis, research or teaching purposes and their subsequent disposal by hospital authorities.  I also authorize the release of specimen test results and/or written reports to my treating physician on the hospital medical staff or other referring or consulting physicians involved in my care, at the discretion of the Pathologist or my treating physician.    6.   I consent to the photographing or videotaping of the operations or procedures to be performed, including appropriate portions of my body for medical, scientific, or educational purposes, provided my identity is not revealed by the pictures or by descriptive texts accompanying them.  If the procedure has been photographed/videotaped, the surgeon will obtain the original picture, image, videotape or CD.  The hospital will not be responsible for storage, release or maintenance of the picture, image, tape or CD.    7.   I consent to the presence of a  or observers in the operating room as deemed necessary by my physician  or their designees.    8.   I recognize that in the event my procedure results in extended X-Ray/fluoroscopy time, I may develop a skin reaction.    9. If I have a Do Not Attempt Resuscitation (DNAR) order in place, that status will be suspended while in the operating room, procedural suite, and during the recovery period unless otherwise explicitly stated by me (or a person authorized to consent on my behalf). The surgeon or my attending physician will determine when the applicable recovery period ends for purposes of reinstating the DNAR order.  10. Patients having a sterilization procedure: I understand that if the procedure is successful the results will be permanent and it will therefore be impossible for me to inseminate, conceive, or bear children.  I also understand that the procedure is intended to result in sterility, although the result has not been guaranteed.   11. I acknowledge that my physician has explained sedation/analgesia administration to me including the risk and benefits I consent to the administration of sedation/analgesia as may be necessary or desirable in the judgment of my physician.    I CERTIFY THAT I HAVE READ AND FULLY UNDERSTAND THE ABOVE CONSENT TO OPERATION and/or OTHER PROCEDURE.    _________________________________________  __________________________________  Signature of Patient     Signature of Responsible Person         ___________________________________         Printed Name of Responsible Person           _________________________________                 Relationship to Patient  _________________________________________  ______________________________  Signature of Witness          Date  Time      Patient Name: Mar Hernandez     : 1940                 Printed: 2024     Medical Record #: [id number]                     Page 1 of 2                                    [address]  [address]    Consent for  Anesthesia    I, Mar Hernandez agree to be cared for by an anesthesiologist, who is specially trained to monitor me and give me medicine to put me to sleep or keep me comfortable during my procedure    I understand that my anesthesiologist is not an employee or agent of [facility] or [employer]. He or she works for [employer].    As the patient asking for anesthesia services, I agree to:  Allow the anesthesiologist (anesthesia doctor) to give me medicine and do additional procedures as necessary. Some examples are: Starting or using an “IV” to give me medicine, fluids or blood during my procedure, and having a breathing tube placed to help me breathe when I’m asleep (intubation). In the event that my heart stops working properly, I understand that my anesthesiologist will make every effort to sustain my life, unless otherwise directed by [facility] Do Not Resuscitate documents.  Tell my anesthesia doctor before my procedure:  If I am pregnant.  The last time that I ate or drank.  All of the medicines I take (including prescriptions, herbal supplements, and pills I can buy without a prescription (including street drugs/illegal medications). Failure to inform my anesthesiologist about these medicines may increase my risk of anesthetic complications.  If I am allergic to anything or have had a reaction to anesthesia before.  I understand how the anesthesia medicine will help me (benefits).  I understand that with any type of anesthesia medicine there are risks:  The most common risks are: nausea, vomiting, sore throat, muscle soreness, damage to my eyes, mouth, or teeth (from breathing tube placement).  Rare risks include: remembering what happened during my procedure, allergic reactions to medications, injury to my airway, heart, lungs, vision, nerves, or muscles and in extremely rare instances death.  My doctor has explained to me other choices available to me for my care  (alternatives).  Pregnant Patients (“epidural”):  I understand that the risks of having an epidural (medicine given into my back to help control pain during labor), include itching, low blood pressure, difficulty urinating, headache or slowing of the baby’s heart. Very rare risks include infection, bleeding, seizure, irregular heart rhythms and nerve injury.  Regional Anesthesia (“spinal”, “epidural”, & “nerve blocks”):  I understand that rare but potential complications include headache, bleeding, infection, seizure, irregular heart rhythms, and nerve injury.    I can change my mind about having anesthesia services at any time before I get the medicine.    _____________________________________________________________________________  Patient (or Representative) Signature/Relationship to Patient  Date   Time    _____________________________________________________________________________   Name (if used)    Language/Organization   Time    _____________________________________________________________________________  Anesthesiologist Signature     Date   Time  I have discussed the procedure and information above with the patient (or patient’s representative) and answered their questions. The patient or their representative has agreed to have anesthesia services.    _____________________________________________________________________________  Witness        Date   Time  I have verified that the signature is that of the patient or patient’s representative, and that it was signed before the procedure  Patient Name: Mar Hernandez     : 1940                 Printed: 2024     Medical Record #: [id number]                     Page 2 of 2

## (undated) NOTE — MR AVS SNAPSHOT
[facility]  [address] [place] [address] [place] [id number] [phone]               Thank you for choosing us for your health care visit with Andre Naik MD.  We are glad to serve you and happy to provide you with this EXAM-ESTABLISHED with Stacy Jones MD   [facility] DERMATOLOGY -- [facility] [place], [address] ([address])    [place] [id number] [address]              Follow-up Instructions     Return in about 1 week (around 5/9/2017), or Memantine HCl 10 MG Tabs   Take 10 mg by mouth 2 (two) times daily.    Commonly known as:  NAMENDA           MetFORMIN HCl 500 MG Tabs   Take 1 tablet (500 mg total) by mouth daily with breakfast.   Commonly known as:  GLUCOPHAGE           PRESERVISION ARE

## (undated) NOTE — LETTER
[address]  [address]    Consent for Anesthesia    IMar agree to be cared for by a physician anesthesiologist alone and/or with a nurse anesthetist, who is specially trained to monitor me and give me medicine to put me to sleep or keep me comfortable during my procedure    I understand that my anesthesiologist and/or anesthetist is not an employee or agent of [facility] or [employer]. He or she works for [employer].    As the patient asking for anesthesia services, I agree to:  Allow the anesthesiologist (anesthesia doctor) to give me medicine and do additional procedures as necessary. Some examples are: Starting or using an “IV” to give me medicine, fluids or blood during my procedure, and having a breathing tube placed to help me breathe when I’m asleep (intubation). In the event that my heart stops working properly, I understand that my anesthesiologist will make every effort to sustain my life, unless otherwise directed by [facility] Do Not Resuscitate documents.  Tell my anesthesia doctor before my procedure:   If I am pregnant.   The last time that I ate or drank.  iii. All of the medicines I take (including prescriptions, herbal supplements, and pills I can buy without a prescription (including street drugs/illegal medications). Failure to inform my anesthesiologist about these medicines may increase my risk of anesthetic complications.  Iv.If I am allergic to anything or have had a reaction to anesthesia before.  I understand how the anesthesia medicine will help me (benefits).  I understand that with any type of anesthesia medicine there are risks:  The most common risks are: nausea, vomiting, sore throat, muscle soreness, damage to my eyes, mouth, or teeth (from breathing tube placement).  Rare risks include: remembering what happened during my procedure, allergic reactions to medications, injury to my airway,  heart, lungs, vision, nerves, or muscles and in extremely rare instances death.  My doctor has explained to me other choices available to me for my care (alternatives).  Pregnant Patients (“epidural”):  I understand that the risks of having an epidural (medicine given into my back to help control pain during labor), include itching, low blood pressure, difficulty urinating, headache or slowing of the baby’s heart. Very rare risks include infection, bleeding, seizure, irregular heart rhythms and nerve injury.  Regional Anesthesia (“spinal”, “epidural”, & “nerve blocks”):  I understand that rare but potential complications include headache, bleeding, infection, seizure, irregular heart rhythms, and nerve injury.    _____________________________________________________________________________  Patient (or Representative) Signature/Relationship to Patient  Date   Time    _____________________________________________________________________________   Name (if used)    Language/Organization   Time    _____________________________________________________________________________  Nurse Anesthetist Signature     Date   Time    ______________________________________________________________________________  Anesthesiologist Signature     Date   Time  I have discussed the procedure and information above with the patient (or patient’s representative) and answered their questions. The patient or their representative has agreed to have anesthesia services.    _____________________________________________________________________________  Witness       Date   Time  I have verified that the signature is that of the patient or patient’s representative, and that it was signed before the procedure    Patient Name: Mar Hernandez     : 1940                 Printed: 2024 at 9:12 AM    Medical Record #: [id number]                                            Page 1 of 1

## (undated) NOTE — MR AVS SNAPSHOT
[address] [facility]  [address] [place] [id number] [phone]               Thank you for choosing us for your health care visit with Kayla March MD.  We are glad to serve you and happy to provide you with this ? Always wear good shoes with proper support and traction. ? Always use hand rails on stairs and escalators. ? Cover porch steps with gritty weather proof paint. ? Pay attention to curbs and other changes in surfaces when out in the community.   ? Take c Calcium-D 600-400 MG-UNIT Tabs   Take by mouth. One po BID           Donepezil HCl 5 MG Tabs   Take 5 mg by mouth nightly.    Commonly known as:  ARICEPT           Levothyroxine Sodium 75 MCG Tabs   Take 1 tablet (75 mcg total) by mouth before breakfast For medical emergencies, dial 911. Educational Information     TOP FALL PREVENTION TIPS    INSIDE YOUR HOME   KITCHEN:  ? Use non skid mats only. ? Clean up spills as soon as they happen. ? Keep objects that you use often within easy reach.   BA

## (undated) NOTE — LETTER
March 22, 2019    Jeannette Traylor  [address]  [facility] [address]      Dear Matilda Sibley: The following are the results of your recent tests. Please review the list of test results.   Your result is the value on the left; we have also supplied the ra

## (undated) NOTE — LETTER
[address]  [address]  Authorization for Surgical Operation and Procedure     Date:___________                                                                                                         Time:__________  I hereby authorize Surgeon(s):  Brandon Roman MD Hutcherson, Lori, MD, my physician and his/her assistants (if applicable), which may include medical students, residents, and/or fellows, to perform the following surgical operation/ procedure and administer such anesthesia as may be determined necessary by my physician:  Operation/Procedure name (s) Procedure(s):  LAPAROSCOPIC CHOLECYSTECTOMY WITH INTRAOPERATIVE CHOLANGIOGRAM on Mar Hernandez   2.   I recognize that during the surgical operation/procedure, unforeseen conditions may necessitate additional or different procedures than those listed above.  I, therefore, further authorize and request that the above-named surgeon, assistants, or designees perform such procedures as are, in their judgment, necessary and desirable.    3.   My surgeon/physician has discussed prior to my surgery the potential benefits, risks and side effects of this procedure; the likelihood of achieving goals; and potential problems that might occur during recuperation.  They also discussed reasonable alternatives to the procedure, including risks, benefits, and side effects related to the alternatives and risks related to not receiving this procedure.  I have had all my questions answered and I acknowledge that no guarantee has been made as to the result that may be obtained.    4.   Should the need arise during my operation/procedure, which includes change of level of care prior to discharge, I also consent to the administration of blood and/or blood products.  Further, I understand that despite careful testing and screening of blood or blood products by collecting agencies, I may still be subject to ill effects as a result of  receiving a blood transfusion and/or blood products.  The following are some, but not all, of the potential risks that can occur: fever and allergic reactions, hemolytic reactions, transmission of diseases such as Hepatitis, AIDS and Cytomegalovirus (CMV) and fluid overload.  In the event that I wish to have an autologous transfusion of my own blood, or a directed donor transfusion, I will discuss this with my physician.  Check only if Refusing Blood or Blood Products  I understand refusal of blood or blood products as deemed necessary by my physician may have serious consequences to my condition to include possible death. I hereby assume responsibility for my refusal and release the hospital, its personnel, and my physicians from any responsibility for the consequences of my refusal.          o  Refuse      5.   I authorize the use of any specimen, organs, tissues, body parts or foreign objects that may be removed from my body during the operation/procedure for diagnosis, research or teaching purposes and their subsequent disposal by hospital authorities.  I also authorize the release of specimen test results and/or written reports to my treating physician on the hospital medical staff or other referring or consulting physicians involved in my care, at the discretion of the Pathologist or my treating physician.    6.   I consent to the photographing or videotaping of the operations or procedures to be performed, including appropriate portions of my body for medical, scientific, or educational purposes, provided my identity is not revealed by the pictures or by descriptive texts accompanying them.  If the procedure has been photographed/videotaped, the surgeon will obtain the original picture, image, videotape or CD.  The hospital will not be responsible for storage, release or maintenance of the picture, image, tape or CD.    7.   I consent to the presence of a  or observers in the operating room  as deemed necessary by my physician or their designees.    8.   I recognize that in the event my procedure results in extended X-Ray/fluoroscopy time, I may develop a skin reaction.    9. If I have a Do Not Attempt Resuscitation (DNAR) order in place, that status will be suspended while in the operating room, procedural suite, and during the recovery period unless otherwise explicitly stated by me (or a person authorized to consent on my behalf). The surgeon or my attending physician will determine when the applicable recovery period ends for purposes of reinstating the DNAR order.  10. Patients having a sterilization procedure: I understand that if the procedure is successful the results will be permanent and it will therefore be impossible for me to inseminate, conceive, or bear children.  I also understand that the procedure is intended to result in sterility, although the result has not been guaranteed.   11. I acknowledge that my physician has explained sedation/analgesia administration to me including the risk and benefits I consent to the administration of sedation/analgesia as may be necessary or desirable in the judgment of my physician.    I CERTIFY THAT I HAVE READ AND FULLY UNDERSTAND THE ABOVE CONSENT TO OPERATION and/or OTHER PROCEDURE.    _________________________________________  __________________________________  Signature of Patient     Signature of Responsible Person         ___________________________________         Printed Name of Responsible Person           _________________________________                 Relationship to Patient  _________________________________________  ______________________________  Signature of Witness          Date  Time      Patient Name: Mar Hernandez     : 1940                 Printed: 2024     Medical Record #: [id number]                     Page 2 of [address] [place] [address]   [id number]    Consent for Anesthesia    IMar agree to be cared for by an anesthesiologist, who is specially trained to monitor me and give me medicine to put me to sleep or keep me comfortable during my procedure    I understand that my anesthesiologist is not an employee or agent of [facility] or [employer]. He or she works for [employer].    As the patient asking for anesthesia services, I agree to:  Allow the anesthesiologist (anesthesia doctor) to give me medicine and do additional procedures as necessary. Some examples are: Starting or using an “IV” to give me medicine, fluids or blood during my procedure, and having a breathing tube placed to help me breathe when I’m asleep (intubation). In the event that my heart stops working properly, I understand that my anesthesiologist will make every effort to sustain my life, unless otherwise directed by [facility] Do Not Resuscitate documents.  Tell my anesthesia doctor before my procedure:  If I am pregnant.  The last time that I ate or drank.  All of the medicines I take (including prescriptions, herbal supplements, and pills I can buy without a prescription (including street drugs/illegal medications). Failure to inform my anesthesiologist about these medicines may increase my risk of anesthetic complications.  If I am allergic to anything or have had a reaction to anesthesia before.  I understand how the anesthesia medicine will help me (benefits).  I understand that with any type of anesthesia medicine there are risks:  The most common risks are: nausea, vomiting, sore throat, muscle soreness, damage to my eyes, mouth, or teeth (from breathing tube placement).  Rare risks include: remembering what happened during my procedure, allergic reactions to medications, injury to my airway, heart, lungs, vision, nerves, or muscles and in extremely rare instances death.  My doctor has explained to me other choices  available to me for my care (alternatives).  Pregnant Patients (“epidural”):  I understand that the risks of having an epidural (medicine given into my back to help control pain during labor), include itching, low blood pressure, difficulty urinating, headache or slowing of the baby’s heart. Very rare risks include infection, bleeding, seizure, irregular heart rhythms and nerve injury.  Regional Anesthesia (“spinal”, “epidural”, & “nerve blocks”):  I understand that rare but potential complications include headache, bleeding, infection, seizure, irregular heart rhythms, and nerve injury.    I can change my mind about having anesthesia services at any time before I get the medicine.    _____________________________________________________________________________  Patient (or Representative) Signature/Relationship to Patient  Date   Time    _____________________________________________________________________________   Name (if used)    Language/Organization   Time    _____________________________________________________________________________  Anesthesiologist Signature     Date   Time  I have discussed the procedure and information above with the patient (or patient’s representative) and answered their questions. The patient or their representative has agreed to have anesthesia services.    _____________________________________________________________________________  Witness        Date   Time  I have verified that the signature is that of the patient or patient’s representative, and that it was signed before the procedure  Patient Name: Mar Hernandez     : 1940                 Printed: 2024     Medical Record #: [id number]                     Page 3 of 3

## (undated) NOTE — LETTER
[address]  [address]  Authorization for Surgical Operation and Procedure     Date:___________                                                                                                         Time:__________  I hereby authorize Surgeon(s):  Brandon Roman MD Hutcherson, Lori, MD, my physician and his/her assistants (if applicable), which may include medical students, residents, and/or fellows, to perform the following surgical operation/ procedure and administer such anesthesia as may be determined necessary by my physician:  Operation/Procedure name (s) Procedure(s):  LAPAROSCOPIC CHOLECYSTECTOMY WITH INTRAOPERATIVE CHOLANGIOGRAM on Mar Hernandez   2.   I recognize that during the surgical operation/procedure, unforeseen conditions may necessitate additional or different procedures than those listed above.  I, therefore, further authorize and request that the above-named surgeon, assistants, or designees perform such procedures as are, in their judgment, necessary and desirable.    3.   My surgeon/physician has discussed prior to my surgery the potential benefits, risks and side effects of this procedure; the likelihood of achieving goals; and potential problems that might occur during recuperation.  They also discussed reasonable alternatives to the procedure, including risks, benefits, and side effects related to the alternatives and risks related to not receiving this procedure.  I have had all my questions answered and I acknowledge that no guarantee has been made as to the result that may be obtained.    4.   Should the need arise during my operation/procedure, which includes change of level of care prior to discharge, I also consent to the administration of blood and/or blood products.  Further, I understand that despite careful testing and screening of blood or blood products by collecting agencies, I may still be subject to ill effects as a result of  receiving a blood transfusion and/or blood products.  The following are some, but not all, of the potential risks that can occur: fever and allergic reactions, hemolytic reactions, transmission of diseases such as Hepatitis, AIDS and Cytomegalovirus (CMV) and fluid overload.  In the event that I wish to have an autologous transfusion of my own blood, or a directed donor transfusion, I will discuss this with my physician.  Check only if Refusing Blood or Blood Products  I understand refusal of blood or blood products as deemed necessary by my physician may have serious consequences to my condition to include possible death. I hereby assume responsibility for my refusal and release the hospital, its personnel, and my physicians from any responsibility for the consequences of my refusal.          o  Refuse      5.   I authorize the use of any specimen, organs, tissues, body parts or foreign objects that may be removed from my body during the operation/procedure for diagnosis, research or teaching purposes and their subsequent disposal by hospital authorities.  I also authorize the release of specimen test results and/or written reports to my treating physician on the hospital medical staff or other referring or consulting physicians involved in my care, at the discretion of the Pathologist or my treating physician.    6.   I consent to the photographing or videotaping of the operations or procedures to be performed, including appropriate portions of my body for medical, scientific, or educational purposes, provided my identity is not revealed by the pictures or by descriptive texts accompanying them.  If the procedure has been photographed/videotaped, the surgeon will obtain the original picture, image, videotape or CD.  The hospital will not be responsible for storage, release or maintenance of the picture, image, tape or CD.    7.   I consent to the presence of a  or observers in the operating room  as deemed necessary by my physician or their designees.    8.   I recognize that in the event my procedure results in extended X-Ray/fluoroscopy time, I may develop a skin reaction.    9. If I have a Do Not Attempt Resuscitation (DNAR) order in place, that status will be suspended while in the operating room, procedural suite, and during the recovery period unless otherwise explicitly stated by me (or a person authorized to consent on my behalf). The surgeon or my attending physician will determine when the applicable recovery period ends for purposes of reinstating the DNAR order.  10. Patients having a sterilization procedure: I understand that if the procedure is successful the results will be permanent and it will therefore be impossible for me to inseminate, conceive, or bear children.  I also understand that the procedure is intended to result in sterility, although the result has not been guaranteed.   11. I acknowledge that my physician has explained sedation/analgesia administration to me including the risk and benefits I consent to the administration of sedation/analgesia as may be necessary or desirable in the judgment of my physician.    I CERTIFY THAT I HAVE READ AND FULLY UNDERSTAND THE ABOVE CONSENT TO OPERATION and/or OTHER PROCEDURE.    _________________________________________  __________________________________  Signature of Patient     Signature of Responsible Person         ___________________________________         Printed Name of Responsible Person           _________________________________                 Relationship to Patient  _________________________________________  ______________________________  Signature of Witness          Date  Time      Patient Name: Mar Hernandez     : 1940                 Printed: 2024     Medical Record #: [id number]                     Page 2 of [address] [place] [address]   [id number]    Consent for Anesthesia    IMar agree to be cared for by an anesthesiologist, who is specially trained to monitor me and give me medicine to put me to sleep or keep me comfortable during my procedure    I understand that my anesthesiologist is not an employee or agent of [facility] or [employer]. He or she works for [employer].    As the patient asking for anesthesia services, I agree to:  Allow the anesthesiologist (anesthesia doctor) to give me medicine and do additional procedures as necessary. Some examples are: Starting or using an “IV” to give me medicine, fluids or blood during my procedure, and having a breathing tube placed to help me breathe when I’m asleep (intubation). In the event that my heart stops working properly, I understand that my anesthesiologist will make every effort to sustain my life, unless otherwise directed by [facility] Do Not Resuscitate documents.  Tell my anesthesia doctor before my procedure:  If I am pregnant.  The last time that I ate or drank.  All of the medicines I take (including prescriptions, herbal supplements, and pills I can buy without a prescription (including street drugs/illegal medications). Failure to inform my anesthesiologist about these medicines may increase my risk of anesthetic complications.  If I am allergic to anything or have had a reaction to anesthesia before.  I understand how the anesthesia medicine will help me (benefits).  I understand that with any type of anesthesia medicine there are risks:  The most common risks are: nausea, vomiting, sore throat, muscle soreness, damage to my eyes, mouth, or teeth (from breathing tube placement).  Rare risks include: remembering what happened during my procedure, allergic reactions to medications, injury to my airway, heart, lungs, vision, nerves, or muscles and in extremely rare instances death.  My doctor has explained to me other choices  available to me for my care (alternatives).  Pregnant Patients (“epidural”):  I understand that the risks of having an epidural (medicine given into my back to help control pain during labor), include itching, low blood pressure, difficulty urinating, headache or slowing of the baby’s heart. Very rare risks include infection, bleeding, seizure, irregular heart rhythms and nerve injury.  Regional Anesthesia (“spinal”, “epidural”, & “nerve blocks”):  I understand that rare but potential complications include headache, bleeding, infection, seizure, irregular heart rhythms, and nerve injury.    I can change my mind about having anesthesia services at any time before I get the medicine.    _____________________________________________________________________________  Patient (or Representative) Signature/Relationship to Patient  Date   Time    _____________________________________________________________________________   Name (if used)    Language/Organization   Time    _____________________________________________________________________________  Anesthesiologist Signature     Date   Time  I have discussed the procedure and information above with the patient (or patient’s representative) and answered their questions. The patient or their representative has agreed to have anesthesia services.    _____________________________________________________________________________  Witness        Date   Time  I have verified that the signature is that of the patient or patient’s representative, and that it was signed before the procedure  Patient Name: Mar Hernandez     : 1940                 Printed: 2024     Medical Record #: [id number]                     Page 3 of 3

## (undated) NOTE — LETTER
July 19, 2019    [address]      Dear Ziada Ruiz: The following are the results of your recent tests. Please review the list of test results.   Your result is the value on the left; we have also supplied the ran

## (undated) NOTE — IP AVS SNAPSHOT
Patient Demographics     Address  [address] [place] [address] Phone  [phone] (Home)  [phone] (Mobile) *Preferred* E-mail Address  [email or web address]      Patient Contacts     Name Relation Home Work Mobile    Kana Hernandez Power of   [phone]    Smitha Hernandez   [phone]    Lisa Hernandez Daughter   [phone]      Allergies as of 2/9/2024  Review status set to In Progress on 2/4/2024   No Known Allergies     Code Status Information     Code Status    DNAR/Selective Treatment        Patient Instructions         [place] hip brace, left side to maintain posterior hip precautions.      No hip flexion greater than 90 degrees.  No leg crossing.  No inward rotation of affected leg.  Legs abducted at all times with abductor pillow.           CBC tomorrow at skilled nursing facility and physician at skilled nursing facility to follow  Slow change in position for possible orthostatic hypotension  Fall precautions at skilled nursing facility  Encourage oral fluids and help with meals at skilled nursing facility       Follow-up Information     Nydia Bauer MD. Schedule an appointment as soon as possible for a visit in 1 week(s).    Specialties: Internal Medicine, IP Consult to Primary Care  Contact information:  [address] [facility] [address] [phone]             Galo Heck MD. Schedule an appointment as soon as possible for a visit in 1 week(s).    Specialties: Sports Medicine, SURGERY, ORTHOPEDIC  Contact information:  [facility] [address] [place] [address] [phone]                        Your Home Meds List      TAKE these medications       Instructions Authorizing Provider Morning Afternoon Evening As Needed   acetaminophen 500 MG Tabs  Commonly known as: Tylenol Extra Strength      Take 1 tablet (500 mg total) by mouth every 6 (six) hours as needed for Pain.   Saul Feliz         apixaban 5 MG Tabs  Commonly known  as: Eliquis  Start taking on: February 8, 2024  Next dose due: 2/9: Night      Take 2 tablets (10 mg total) by mouth 2 (two) times daily for 6 days, THEN 1 tablet (5 mg total) 2 (two) times daily.  Stop taking on: May 8, 2024   Rachel Bearden         Calcium + Vitamin D3 600-10 MG-MCG Tabs  Generic drug: Calcium Carb-Cholecalciferol  Next dose due: 2/10: morning       TAKE 1 TAB BY MOUTH TWICE DAILY   Saul Feliz         cyanocobalamin 250 MCG Tabs  Commonly known as: Vitamin B12      TAKE 1 TAB BY MOUTH DAILY   Saul Feliz         donepezil 10 MG Tabs  Commonly known as: Aricept  Next dose due: 2/10: morning       Take 1 tablet (10 mg total) by mouth daily.   Saul Feliz         fexofenadine 180 MG Tabs  Commonly known as: Allegra      Take 1 tablet (180 mg total) by mouth daily. One tab po q am prn sinus headache or PND.   Nydia Long         guaiFENesin  MG Tb12  Commonly known as: Mucinex      Take 2 tablets (1,200 mg total) by mouth 2 (two) times daily.   Nydia Long         ipratropium 0.06 % Soln  Commonly known as: Atrovent      2 sprays by Nasal route 4 (four) times daily.   Nydia Long         levothyroxine 75 MCG Tabs  Commonly known as: Synthroid  Next dose due: 2/10: morning       TAKE 1 TAB BY MOUTH EVERY MORNING BEFORE BREAKFAST   Saul Feliz         lisinopril 5 MG Tabs  Commonly known as: Prinivil; Zestril  Start taking on: February 10, 2024  Next dose due: 2/10: morning       Take 1 tablet (5 mg total) by mouth daily.  Stop taking on: March 11, 2024   Rachel Bearden         memantine 10 MG Tabs  Commonly known as: Namenda  Next dose due: 2/10: morning       TAKE 1 TAB BY MOUTH TWICE DAILY   Saul Feliz         Nystatin 886824 UNIT/GM Powd      Apply under both breast 3 times daily x30 days   Saul Feliz         PreserVision AREDS Tabs  Next dose due: 2/09: Night       Take 1 capsule by mouth 2 (two) times daily.   Saul Feliz         PROzac  10 MG Caps  Generic drug: FLUoxetine  Next dose due: 2/10: morning       Take 1 capsule (10 mg total) by mouth daily.   Nydia Bauer         SEROquel 25 MG Tabs  Generic drug: QUEtiapine  Next dose due: 2/09: Night       Take 1 tablet (25 mg total) by mouth nightly.   Nydia Bauer         simvastatin 20 MG Tabs  Commonly known as: Zocor  Next dose due: 2/09: Night       Take 1 tablet (20 mg total) by mouth nightly.   Saul Feliz         traZODone 50 MG Tabs  Commonly known as: Desyrel  Next dose due: 2/09: night       Take 1 tablet (50 mg total) by mouth nightly.   Nydia Bauer               Where to Get Your Medications      These medications were sent to [facility] [place] [facility] [place], [place] [address] [facility] [address] [phone], [phone]  [address] [address]    Phone: [phone]   apixaban 5 MG Tabs  lisinopril 5 MG Tabs           [id number] - MAR ACTION REPORT  (last 48 hrs)    ** SITE UNKNOWN **     Order ID Medication Name Action Time Action Reason Comments    [id number] FLUoxetine (PROzac) tab 10 mg 02/08/24 0832 Given      [id number] FLUoxetine (PROzac) tab 10 mg 02/09/24 0820 Given      [id number] QUEtiapine (SEROquel) tab 25 mg 02/07/24 2220 Given      [id number] QUEtiapine (SEROquel) tab 25 mg 02/08/24 2012 Given      [id number] acetaminophen (Tylenol Extra Strength) tab 500 mg 02/08/24 2013 Given      [id number] acetaminophen (Tylenol Extra Strength) tab 500 mg 02/09/24 0821 Given      [id number] acetaminophen-codeine (Tylenol #3) 300-30 MG per tab 1 tablet 02/08/24 1014 Given      [id number] acetaminophen-codeine (Tylenol #3) 300-30 MG per tab 1 tablet 02/08/24 2012 Given      [id number] apixaban (Eliquis) tab 10 mg (Followed by Linked Group #1) 02/08/24 2012 Given      [id number] apixaban (Eliquis) tab 10 mg 02/09/24 0820 Given      [id number] atorvastatin (Lipitor) tab 10 mg 02/07/24 2222 Given      [id number] atorvastatin (Lipitor) tab 10 mg 02/08/24  2012 Given      [id number] docusate sodium (Colace) cap 100 mg 02/07/24 2220 Given      [id number] docusate sodium (Colace) cap 100 mg 02/08/24 0900 Given      [id number] docusate sodium (Colace) cap 100 mg 02/08/24 2012 Given      [id number] docusate sodium (Colace) cap 100 mg 02/09/24 0820 Given      [id number] donepezil (Aricept) tab 10 mg 02/08/24 0827 Given      [id number] donepezil (Aricept) tab 10 mg 02/09/24 0820 Given      [id number] heparin (Porcine) 1000 UNIT/ML injection - BOLUS IV 6,500 Units 02/07/24 1819 Given      [id number] heparin (Porcine) 69787 units/250mL infusion (PE/DVT/THROMBUS) INITIAL DOSE 02/07/24 1820 New Bag      [id number] levothyroxine (Synthroid) tab 75 mcg 02/08/24 0543 Given      [id number] levothyroxine (Synthroid) tab 75 mcg 02/09/24 0604 Given      [id number] magnesium hydroxide (Milk of Magnesia) 400 MG/5ML oral suspension 30 mL 02/09/24 0821 Given      [id number] multivitamin (Tab-A-Carole/Beta Carotene) tab 1 tablet 02/08/24 0827 Given      [id number] multivitamin (Tab-A-Carole/Beta Carotene) tab 1 tablet 02/09/24 0820 Given      [id number] polyethylene glycol (PEG 3350) (Miralax) 17 g oral packet 17 g 02/08/24 0543 Given      [id number] potassium chloride (K-Dur) tab 20 mEq 02/08/24 2012 Given      [id number] sennosides (Senokot) tab 17.2 mg 02/07/24 2220 Given      [id number] sennosides (Senokot) tab 17.2 mg 02/08/24 2012 Given      [id number] sodium chloride 0.9 % IV bolus 500 mL 02/09/24 1205 New Bag      [id number] traZODone (Desyrel) tab 50 mg 02/07/24 2220 Given      [id number] traZODone (Desyrel) tab 50 mg 02/08/24 2012 Given            LEFT ANTECUBITAL     Order ID Medication Name Action Time Action Reason Comments    [id number] iopamidol 76% (ISOVUE-370) injection for power injector 02/07/24 1655 Given              Recent Vital Signs    Flowsheet Row Most Recent Value   /69 Filed at 02/09/2024 1412   Pulse 91 Filed at 02/09/2024 1412   Resp 18 Filed at 02/09/2024 1141   Temp 97.5 °F  (36.4 °C) Filed at 02/09/2024 1141   SpO2 94 % Filed at 02/09/2024 1424      Patient's Most Recent Weight    Flowsheet Row Most Recent Value   Patient Weight 80.7 kg (178 lb)         Lab Results Last 24 Hours      CBC With Differential With Platelet [[id number]] (Abnormal)  Resulted: 02/09/24 1230, Result status: Final result   Ordering provider: Rachel Bearden MD  02/09/24 1202 Resulting lab: [facility] ([facility])   Narrative:  The following orders were created for panel order CBC With Differential With Platelet.  Procedure                               Abnormality         Status                     ---------                               -----------         ------                     CBC W/ DIFFERENTIAL[[id number]]          Abnormal            Final result                 Please view results for these tests on the individual orders.    Specimen Information    Type Source Collected On   Blood — 02/09/24 1209            PTT, Activated [[id number]] (Abnormal)  Resulted: 02/09/24 0539, Result status: Final result   Ordering provider: Afia Chaudhry DO  02/08/24 2300 Resulting lab: [facility] ([facility])    Specimen Information    Type Source Collected On   Blood — 02/09/24 0506          Components    Component Value Reference Range Flag Lab   PTT 40.2 23.3 - 35.6 seconds [facility] ([facility])   Comment:        The aPTT Heparin Therapeutic Range is approximately 65- 104 seconds. The therapeutic range has been validated against 0.3-0.7 heparin anti-Xa units/mL.    Elevations of the aPTT in patients not receiving anticoagulant therapy (Heparin, etc.), may be seen in Factor deficiency, vitamin K deficiency,   factor inhibitors, liver disease, etc.   Clinical correlation is recommended.                          Basic Metabolic Panel (8) [[id number]] (Abnormal)  Resulted: 02/09/24 0535, Result status: Final result   Ordering provider: Rachel Bearden MD  02/08/24 2300 Resulting lab: [place]  [facility] ([facility])    Specimen Information    Type Source Collected On   Blood — 02/09/24 0506          Components    Component Value Reference Range Flag Lab   Glucose 107 70 - 99 mg/dL [facility] ([facility])   Sodium 139 136 - 145 mmol/L — [facility] ([facility])   Potassium 3.5 3.5 - 5.1 mmol/L — [facility] ([facility])   Chloride 107 98 - 112 mmol/L — [facility] ([facility])   CO2 29.0 21.0 - 32.0 mmol/L — [facility])   Anion Gap 3 0 - 18 mmol/L — [facility] ([facility])   BUN 16 9 - 23 mg/dL — [facility] ([facility])   Creatinine 0.70 0.55 - 1.02 mg/dL — [facility] ([facility])   Calcium, Total 10.1 8.5 - 10.1 mg/dL — [facility] ([facility])   Calculated Osmolality 290 275 - 295 mOsm/kg — [facility] ([facility])   eGFR-Cr 87 >=60 mL/min/1.73m2 — [facility] ([facility])            CBC With Differential With Platelet [[id number]] (Abnormal)  Resulted: 02/09/24 0531, Result status: Final result   Ordering provider: Rachel Bearden MD  02/08/24 2300 Resulting lab: [facility] ([facility])   Narrative:  The following orders were created for panel order CBC With Differential With Platelet.  Procedure                               Abnormality         Status                     ---------                               -----------         ------                     CBC W/ DIFFERENTIAL[[id number]]          Abnormal            Final result                 Please view results for these tests on the individual orders.    Specimen Information    Type Source Collected On   Blood — 02/09/24 0506            CBC With Differential With Platelet [[id number]] (Abnormal)  Resulted: 02/08/24 2039, Result status: Final result   Ordering provider: Rachel Bearden MD  02/08/24 1838 Resulting lab: [facility] ([facility])   Narrative:  The following orders were created for panel order CBC With Differential With Platelet.  Procedure                               Abnormality         Status                     ---------                                -----------         ------                     CBC W/ DIFFERENTIAL[[id number]]          Abnormal            Final result                 Please view results for these tests on the individual orders.    Specimen Information    Type Source Collected On   Blood — 02/08/24 1932            PTT, Activated [[id number]] (Abnormal)  Resulted: 02/08/24 1457, Result status: Final result   Ordering provider: Afia Chaudhry DO  02/08/24 1024 Resulting lab: [facility] ([facility])    Specimen Information    Type Source Collected On   Blood — 02/08/24 1421          Components    Component Value Reference Range Flag Lab   PTT 81.5 23.3 - 35.6 seconds [facility] ([facility])   Comment:        The aPTT Heparin Therapeutic Range is approximately 65- 104 seconds. The therapeutic range has been validated against 0.3-0.7 heparin anti-Xa units/mL.    Elevations of the aPTT in patients not receiving anticoagulant therapy (Heparin, etc.), may be seen in Factor deficiency, vitamin K deficiency,   factor inhibitors, liver disease, etc.   Clinical correlation is recommended.                          Testing Performed By     Lab - Abbreviation Name Director Address Valid Date Range    139 - [facility] ([facility]) [facility] ([facility]) Goldberg, Cathryn A. MD [address] [place] [id number] 03/19/20 1441 - Present            Microbiology Results (All)     Procedure Component Value Units Date/Time    MRSA/SA Scrn by PCR:[facility] only [[id number]]  (Normal) Collected: 02/03/24 1856    Order Status: Completed Lab Status: Final result Updated: 02/03/24 2010    Specimen: Other from [facility]      Staph Aureus Screen By PCR Negative     MRSA Screen By PCR Negative    Rapid SARS-CoV-2 by PCR [[id number]]  (Normal) Collected: 02/03/24 1857    Order Status: Completed Lab Status: Final result Updated: 02/03/24 1915    Specimen: Other from [facility]      Rapid SARS-CoV-2 by PCR Not Detected         H&P - H&P Note       H&P signed by Afia Chaudhry DO at 2/3/2024  8:17 PM  Version 3 of 3    Author: Afia Chaudhry DO Service: — Author Type: Physician    Filed: 2/3/2024  8:17 PM Date of Service: 2/3/2024  7:23 PM Status: Addendum    : Afia Chaudhry DO (Physician)    Related Notes: Original Note by Afia Chaudhry DO (Physician) filed at 2/3/2024  8:17 PM         [facility]     Mar Hernandez Patient Status:  Emergency    1940 MRN [id number]   Location [facility] EMERGENCY DEPARTMENT Attending Albert Tracy MD   Hosp Day # 0 PCP Nydia Bauer MD     Chief Complaint: Left hip pain after mechanical fall    Subjective:    History of Present Illness:     Mar Hernandez is a 83 year old female with Past medical history essential hypertension, hyperlipidemia, hypothyroidism, dementia (baseline alert and oriented x 0-1), presents to the hospital today after a mechanical fall causing hip pain.  Patient lives at a nursing facility and I was met with patient's son and daughter-in-law at the bedside.  They were not with their loved one at the facility but per chart review, speaking to staff and from family patient was otherwise in her normal state of health when she tripped and fell on her left hip.  Pain has been having pain and came into the hospital further evaluation.  Patient otherwise denies any chest pain, shortness of breath, fevers, chills, nausea, vomiting, diarrhea.          History/Other:    Past Medical History:  Past Medical History:   Diagnosis Date    Contusion of scalp 5/10/2022    COVID-19 virus infection 2023    Head injury 5/10/2022    S/P hysterectomy late s     Past Surgical History:   Past Surgical History:   Procedure Laterality Date    HYSTERECTOMY      SKIN SURGERY Left 2016    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    SKIN SURGERY Left 2016    ED&C of SCCIS to L Upper Arm by SD    SKIN SURGERY Left 2016    ED&C of SCCIS to L Upper Back by SD       Family History:   Family History   Problem Relation Age of Onset    Other (Other) Father         Aortic Aneurysm    Hypertension Brother      Social History:    reports that she has never smoked. She has never used smokeless tobacco. She reports current alcohol use of about 1.0 standard drink of alcohol per week. She reports that she does not use drugs.     Allergies: No Known Allergies    Medications:    No current facility-administered medications on file prior to encounter.     Current Outpatient Medications on File Prior to Encounter   Medication Sig Dispense Refill    QUEtiapine (SEROQUEL) 25 MG Oral Tab Take 1 tablet (25 mg total) by mouth nightly.      traZODone 50 MG Oral Tab Take 1 tablet (50 mg total) by mouth nightly.      lisinopril 20 MG Oral Tab Take 1 tablet (20 mg total) by mouth daily. 90 tablet 3    FLUoxetine (PROZAC) 10 MG Oral Cap Take 1 capsule (10 mg total) by mouth daily. 90 capsule 3    fexofenadine 180 MG Oral Tab Take 1 tablet (180 mg total) by mouth daily. One tab po q am prn sinus headache or PND. 30 tablet 0    Ipratropium Bromide 0.06 % Nasal Solution 2 sprays by Nasal route 4 (four) times daily. 1 each 0    guaiFENesin  MG Oral Tablet 12 Hr Take 2 tablets (1,200 mg total) by mouth 2 (two) times daily. 60 tablet 5    Nystatin 841083 UNIT/GM External Powder Apply under both breast 3 times daily x30 days 60 g 1    MEMANTINE HCL 10 MG Oral Tab TAKE 1 TAB BY MOUTH TWICE DAILY 90 tablet 3    LEVOTHYROXINE SODIUM 75 MCG Oral Tab TAKE 1 TAB BY MOUTH EVERY MORNING BEFORE BREAKFAST 90 tablet 0    CALCIUM + VITAMIN D3 600-10 MG-MCG Oral Tab TAKE 1 TAB BY MOUTH TWICE DAILY 90 tablet 3    VITAMIN B-12 250 MCG Oral Tab TAKE 1 TAB BY MOUTH DAILY 90 tablet 3    simvastatin 20 MG Oral Tab Take 1 tablet (20 mg total) by mouth nightly. 90 tablet 2    Donepezil HCl 10 MG Oral Tab Take 1 tablet (10 mg total) by mouth daily. 90 tablet 3    Multiple Vitamins-Minerals (PRESERVISION AREDS) Oral Tab  Take 1 capsule by mouth 2 (two) times daily. 180 tablet 3    acetaminophen 500 MG Oral Tab Take 1 tablet (500 mg total) by mouth every 6 (six) hours as needed for Pain. 90 tablet 3       Review of Systems:   A comprehensive review of systems was completed.    Pertinent positives and negatives noted in the HPI.    Objective:   Physical Exam:    /64   Pulse 72   Temp 98.2 °F (36.8 °C) (Temporal)   Resp 17   Ht 5' 4\" (1.626 m)   Wt 178 lb 9.2 oz (81 kg)   SpO2 97%   BMI 30.65 kg/m²   General: No acute distress, Alert  Respiratory: No rhonchi, no wheezes  Cardiovascular: S1, S2. Regular rate and rhythm  Abdomen: Soft, Non-tender, non-distended, positive bowel sounds  Neuro: No new focal deficits  Extremities: No edema      Results:    Labs:      Labs Last 24 Hours:    Recent Labs   Lab 02/03/24  1648   RBC 4.98   HGB 14.9   HCT 45.1   MCV 90.6   MCH 29.9   MCHC 33.0   RDW 12.3   NEPRELIM 12.49*   WBC 14.9*   .0       Recent Labs   Lab 02/03/24  1648   *   BUN 15   CREATSERUM 0.99   EGFRCR 57*   CA 9.7   ALB 3.7      K 3.8      CO2 26.0   ALKPHO 113   AST 20   ALT 30   BILT 0.4   TP 7.1       Lab Results   Component Value Date    INR 1.00 02/03/2024       No results for input(s): \"TROP\", \"TROPHS\", \"CK\" in the last 168 hours.    No results for input(s): \"TROP\", \"PBNP\" in the last 168 hours.    Recent Labs   Lab 02/03/24  1648   PCT <0.05       Imaging: Imaging data reviewed in Epic.    Assessment & Plan:      #Left femoral neck fracture  #Mechanical fall  -Orthopedic surgery consulted from the emergency room  -Pain control, IV fluids  -N.p.o.    #Leukocytosis  -Likely secondary to above, likely reactive  -Repeat labs in a.m.    #Dementia  -Near baseline which is alert and oriented x 0-1  -Has history of sundowning    #Essential hypertension    #Hyperlipidemia    #Hypothyroidism    #Skin cancer    #Obesity  -BMI 30.65    #ACP  -DNR, 16 minutes spent face-to-face with patient and  patient's son at the bedside.  This was a voluntary conversation.  We reviewed the meaning of cardiac arrest and the use of a/CPR.  Patient would not want these measures.  Order updated on epic.        Plan of care discussed with ER physician, patient, patient's family    Afia Chaudhry DO    Supplementary Documentation:     The  Cures Act makes medical notes like these available to patients in the interest of transparency. Please be advised this is a medical document. Medical documents are intended to carry relevant information, facts as evident, and the clinical opinion of the practitioner. The medical note is intended as peer to peer communication and may appear blunt or direct. It is written in medical language and may contain abbreviations or verbiage that are unfamiliar.                                   Electronically signed by Afia Chaudhry DO on 2/3/2024  8:17 PM     H&P signed by Afia Chaudhry DO at 2/3/2024  8:17 PM  Version 2 of 3    Author: Afia Chaudhry DO Service: — Author Type: Physician    Filed: 2/3/2024  8:17 PM Date of Service: 2/3/2024  7:23 PM Status: Addendum    : Afia Chaudhry DO (Physician)    Related Notes: Addendum by Afia Chaudhry DO (Physician) filed at 2/3/2024  8:17 PM  Original Note by Afia Chaudhry DO (Physician) filed at 2/3/2024  8:15 PM         [facility]     Mar Hernandez Patient Status:  Emergency    1940 MRN [id number]   Location [facility] EMERGENCY DEPARTMENT Attending Albert Tracy MD   Hosp Day # 0 PCP Nydia Bauer MD     Chief Complaint: Left hip pain after mechanical fall    Subjective:    History of Present Illness:     Mar Hernandez is a 83 year old female with Past medical history essential hypertension, hyperlipidemia, hypothyroidism, dementia (baseline alert and oriented x 0-1), presents to the hospital today after a mechanical fall causing hip pain.  Patient lives at a nursing facility and I was met with  patient's son and daughter-in-law at the bedside.  They were not with their loved one at the facility but per chart review, speaking to staff and from family patient was otherwise in her normal state of health when she tripped and fell on her left hip.  Pain has been having pain and came into the hospital further evaluation.  Patient otherwise denies any chest pain, shortness of breath, fevers, chills, nausea, vomiting, diarrhea.          History/Other:    Past Medical History:  Past Medical History:   Diagnosis Date    Contusion of scalp 5/10/2022    COVID-19 virus infection 1/11/2023    Head injury 5/10/2022    S/P hysterectomy late 80's     Past Surgical History:   Past Surgical History:   Procedure Laterality Date    HYSTERECTOMY      SKIN SURGERY Left 04/27/2016    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    SKIN SURGERY Left 04/27/2016    ED&C of SCCIS to L Upper Arm by SD    SKIN SURGERY Left 04/27/2016    ED&C of SCCIS to L Upper Back by SD      Family History:   Family History   Problem Relation Age of Onset    Other (Other) Father         Aortic Aneurysm    Hypertension Brother      Social History:    reports that she has never smoked. She has never used smokeless tobacco. She reports current alcohol use of about 1.0 standard drink of alcohol per week. She reports that she does not use drugs.     Allergies: No Known Allergies    Medications:    No current facility-administered medications on file prior to encounter.     Current Outpatient Medications on File Prior to Encounter   Medication Sig Dispense Refill    QUEtiapine (SEROQUEL) 25 MG Oral Tab Take 1 tablet (25 mg total) by mouth nightly.      traZODone 50 MG Oral Tab Take 1 tablet (50 mg total) by mouth nightly.      lisinopril 20 MG Oral Tab Take 1 tablet (20 mg total) by mouth daily. 90 tablet 3    FLUoxetine (PROZAC) 10 MG Oral Cap Take 1 capsule (10 mg total) by mouth daily. 90 capsule 3    fexofenadine 180 MG Oral Tab Take 1 tablet (180 mg total)  by mouth daily. One tab po q am prn sinus headache or PND. 30 tablet 0    Ipratropium Bromide 0.06 % Nasal Solution 2 sprays by Nasal route 4 (four) times daily. 1 each 0    guaiFENesin  MG Oral Tablet 12 Hr Take 2 tablets (1,200 mg total) by mouth 2 (two) times daily. 60 tablet 5    Nystatin 326547 UNIT/GM External Powder Apply under both breast 3 times daily x30 days 60 g 1    MEMANTINE HCL 10 MG Oral Tab TAKE 1 TAB BY MOUTH TWICE DAILY 90 tablet 3    LEVOTHYROXINE SODIUM 75 MCG Oral Tab TAKE 1 TAB BY MOUTH EVERY MORNING BEFORE BREAKFAST 90 tablet 0    CALCIUM + VITAMIN D3 600-10 MG-MCG Oral Tab TAKE 1 TAB BY MOUTH TWICE DAILY 90 tablet 3    VITAMIN B-12 250 MCG Oral Tab TAKE 1 TAB BY MOUTH DAILY 90 tablet 3    simvastatin 20 MG Oral Tab Take 1 tablet (20 mg total) by mouth nightly. 90 tablet 2    Donepezil HCl 10 MG Oral Tab Take 1 tablet (10 mg total) by mouth daily. 90 tablet 3    Multiple Vitamins-Minerals (PRESERVISION AREDS) Oral Tab Take 1 capsule by mouth 2 (two) times daily. 180 tablet 3    acetaminophen 500 MG Oral Tab Take 1 tablet (500 mg total) by mouth every 6 (six) hours as needed for Pain. 90 tablet 3       Review of Systems:   A comprehensive review of systems was completed.    Pertinent positives and negatives noted in the HPI.    Objective:   Physical Exam:    /64   Pulse 72   Temp 98.2 °F (36.8 °C) (Temporal)   Resp 17   Ht 5' 4\" (1.626 m)   Wt 178 lb 9.2 oz (81 kg)   SpO2 97%   BMI 30.65 kg/m²   General: No acute distress, Alert  Respiratory: No rhonchi, no wheezes  Cardiovascular: S1, S2. Regular rate and rhythm  Abdomen: Soft, Non-tender, non-distended, positive bowel sounds  Neuro: No new focal deficits  Extremities: No edema      Results:    Labs:      Labs Last 24 Hours:    Recent Labs   Lab 02/03/24  1648   RBC 4.98   HGB 14.9   HCT 45.1   MCV 90.6   MCH 29.9   MCHC 33.0   RDW 12.3   NEPRELIM 12.49*   WBC 14.9*   .0       Recent Labs   Lab 02/03/24  1648   GLU  110*   BUN 15   CREATSERUM 0.99   EGFRCR 57*   CA 9.7   ALB 3.7      K 3.8      CO2 26.0   ALKPHO 113   AST 20   ALT 30   BILT 0.4   TP 7.1       Lab Results   Component Value Date    INR 1.00 02/03/2024       No results for input(s): \"TROP\", \"TROPHS\", \"CK\" in the last 168 hours.    No results for input(s): \"TROP\", \"PBNP\" in the last 168 hours.    Recent Labs   Lab 02/03/24  1648   PCT <0.05       Imaging: Imaging data reviewed in Epic.    Assessment & Plan:      #Left femoral neck fracture  -Orthopedic surgery consulted from the emergency room  -Pain control, IV fluids  -N.p.o.    #Leukocytosis  -Likely secondary to above, likely reactive  -Repeat labs in a.m.    #Dementia  -Near baseline which is alert and oriented x 0-1  -Has history of sundowning    #Essential hypertension    #Hyperlipidemia    #Hypothyroidism    #Skin cancer    #Obesity  -BMI 30.65    #ACP  -DNR, 16 minutes spent face-to-face with patient and patient's son at the bedside.  This was a voluntary conversation.  We reviewed the meaning of cardiac arrest and the use of a/CPR.  Patient would not want these measures.  Order updated on epic.        Plan of care discussed with ER physician, patient, patient's family    Afia Chaudhry DO    Supplementary Documentation:     The 21st Century Cures Act makes medical notes like these available to patients in the interest of transparency. Please be advised this is a medical document. Medical documents are intended to carry relevant information, facts as evident, and the clinical opinion of the practitioner. The medical note is intended as peer to peer communication and may appear blunt or direct. It is written in medical language and may contain abbreviations or verbiage that are unfamiliar.                                   Electronically signed by Afia Chaudhry DO on 2/3/2024  8:17 PM     H&P signed by Afia Chaudhry DO at 2/3/2024  8:15 PM  Version 1 of 3    Author: Afia Chaudhry DO Service: — Author  Type: Physician    Filed: 2/3/2024  8:15 PM Date of Service: 2/3/2024  7:23 PM Status: Signed    : Afia Chaudhry DO (Physician)    Related Notes: Addendum by Afia Chaudhry DO (Physician) filed at 2/3/2024  8:17 PM         [facility]     Mar [facility] Patient Status:  Emergency    1940 MRN [id number]   Location [facility] EMERGENCY DEPARTMENT Attending Albert Tracy MD   Hosp Day # 0 PCP Nydia Bauer MD     Chief Complaint: Left hip pain after mechanical fall    Subjective:    History of Present Illness:     Mar Hernandez is a 83 year old female with Past medical history essential hypertension, hyperlipidemia, dementia (baseline alert and oriented x 0-1), presents to the hospital today after a mechanical fall causing hip pain.  Patient lives at a nursing facility and I was met with patient's son and daughter-in-law at the bedside.  They were not with their loved one at the facility but per chart review, speaking to staff and from family patient was otherwise in her normal state of health when she tripped and fell on her left hip.  Pain has been having pain and came into the hospital further evaluation.  Patient otherwise denies any chest pain, shortness of breath, fevers, chills, nausea, vomiting, diarrhea.          History/Other:    Past Medical History:  Past Medical History:   Diagnosis Date    Contusion of scalp 5/10/2022    COVID-19 virus infection 2023    Head injury 5/10/2022    S/P hysterectomy late 80's     Past Surgical History:   Past Surgical History:   Procedure Laterality Date    HYSTERECTOMY      SKIN SURGERY Left 2016    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    SKIN SURGERY Left 2016    ED&C of SCCIS to L Upper Arm by SD    SKIN SURGERY Left 2016    ED&C of SCCIS to L Upper Back by SD      Family History:   Family History   Problem Relation Age of Onset    Other (Other) Father         Aortic Aneurysm    Hypertension Brother       Social History:    reports that she has never smoked. She has never used smokeless tobacco. She reports current alcohol use of about 1.0 standard drink of alcohol per week. She reports that she does not use drugs.     Allergies: No Known Allergies    Medications:    No current facility-administered medications on file prior to encounter.     Current Outpatient Medications on File Prior to Encounter   Medication Sig Dispense Refill    QUEtiapine (SEROQUEL) 25 MG Oral Tab Take 1 tablet (25 mg total) by mouth nightly.      traZODone 50 MG Oral Tab Take 1 tablet (50 mg total) by mouth nightly.      lisinopril 20 MG Oral Tab Take 1 tablet (20 mg total) by mouth daily. 90 tablet 3    FLUoxetine (PROZAC) 10 MG Oral Cap Take 1 capsule (10 mg total) by mouth daily. 90 capsule 3    fexofenadine 180 MG Oral Tab Take 1 tablet (180 mg total) by mouth daily. One tab po q am prn sinus headache or PND. 30 tablet 0    Ipratropium Bromide 0.06 % Nasal Solution 2 sprays by Nasal route 4 (four) times daily. 1 each 0    guaiFENesin  MG Oral Tablet 12 Hr Take 2 tablets (1,200 mg total) by mouth 2 (two) times daily. 60 tablet 5    Nystatin 419697 UNIT/GM External Powder Apply under both breast 3 times daily x30 days 60 g 1    MEMANTINE HCL 10 MG Oral Tab TAKE 1 TAB BY MOUTH TWICE DAILY 90 tablet 3    LEVOTHYROXINE SODIUM 75 MCG Oral Tab TAKE 1 TAB BY MOUTH EVERY MORNING BEFORE BREAKFAST 90 tablet 0    CALCIUM + VITAMIN D3 600-10 MG-MCG Oral Tab TAKE 1 TAB BY MOUTH TWICE DAILY 90 tablet 3    VITAMIN B-12 250 MCG Oral Tab TAKE 1 TAB BY MOUTH DAILY 90 tablet 3    simvastatin 20 MG Oral Tab Take 1 tablet (20 mg total) by mouth nightly. 90 tablet 2    Donepezil HCl 10 MG Oral Tab Take 1 tablet (10 mg total) by mouth daily. 90 tablet 3    Multiple Vitamins-Minerals (PRESERVISION AREDS) Oral Tab Take 1 capsule by mouth 2 (two) times daily. 180 tablet 3    acetaminophen 500 MG Oral Tab Take 1 tablet (500 mg total) by mouth every 6 (six)  hours as needed for Pain. 90 tablet 3       Review of Systems:   A comprehensive review of systems was completed.    Pertinent positives and negatives noted in the HPI.    Objective:   Physical Exam:    /64   Pulse 72   Temp 98.2 °F (36.8 °C) (Temporal)   Resp 17   Ht 5' 4\" (1.626 m)   Wt 178 lb 9.2 oz (81 kg)   SpO2 97%   BMI 30.65 kg/m²   General: No acute distress, Alert  Respiratory: No rhonchi, no wheezes  Cardiovascular: S1, S2. Regular rate and rhythm  Abdomen: Soft, Non-tender, non-distended, positive bowel sounds  Neuro: No new focal deficits  Extremities: No edema      Results:    Labs:      Labs Last 24 Hours:    Recent Labs   Lab 02/03/24  1648   RBC 4.98   HGB 14.9   HCT 45.1   MCV 90.6   MCH 29.9   MCHC 33.0   RDW 12.3   NEPRELIM 12.49*   WBC 14.9*   .0       Recent Labs   Lab 02/03/24  1648   *   BUN 15   CREATSERUM 0.99   EGFRCR 57*   CA 9.7   ALB 3.7      K 3.8      CO2 26.0   ALKPHO 113   AST 20   ALT 30   BILT 0.4   TP 7.1       Lab Results   Component Value Date    INR 1.00 02/03/2024       No results for input(s): \"TROP\", \"TROPHS\", \"CK\" in the last 168 hours.    No results for input(s): \"TROP\", \"PBNP\" in the last 168 hours.    Recent Labs   Lab 02/03/24  1648   PCT <0.05       Imaging: Imaging data reviewed in Epic.    Assessment & Plan:      #Left femoral neck fracture  -Orthopedic surgery consulted from the emergency room  -Pain control, IV fluids  -N.p.o.    #Leukocytosis  -Likely secondary to above, likely reactive  -Repeat labs in a.m.    #Dementia  -Near baseline which is alert and oriented x 0-1  -Has history of sundowning    #Essential hypertension    #Hyperlipidemia    #Skin cancer    #Obesity  -BMI 30.65    #ACP  -DNR, 16 minutes spent face-to-face with patient and patient's son at the bedside.  This was a voluntary conversation.  We reviewed the meaning of cardiac arrest and the use of a/CPR.  Patient would not want these measures.  Order updated  on epic.        Plan of care discussed with ER physician, patient, patient's family    Afia Chaudhry DO    Supplementary Documentation:     The  Cures Act makes medical notes like these available to patients in the interest of transparency. Please be advised this is a medical document. Medical documents are intended to carry relevant information, facts as evident, and the clinical opinion of the practitioner. The medical note is intended as peer to peer communication and may appear blunt or direct. It is written in medical language and may contain abbreviations or verbiage that are unfamiliar.                                   Electronically signed by Afia Chaudhry DO on 2/3/2024  8:15 PM              Consults - MD Consult Notes      Consults signed by Boris Patel MD at 2024  4:34 PM     Author: Boris Patel MD Service: Pulmonology Author Type: Physician    Filed: 2024  4:34 PM Date of Service: 2024  4:29 PM Status: Signed    : Boris Patel MD (Physician)     Consult Orders    1. Consult to Pulmonology [[id number]] ordered by Rachel Bearden MD at [facility]             [facility] Pulmonary Consult Note       Mar Hernandez Patient Status:  Inpatient    1940 MRN [id number]   Location [facility] 3SW-A Attending Rachel Bearden MD   Hosp Day # 4 PCP Nydia Bauer MD     Reason for Consultation:  Respiratory failure    History of Present Illness:  Mar Hernandez is a a(n) 83 year old female with a history of hypertension, hyperlipidemia, hypothyroidism, dementia ANO x 1 at baseline, presented on 2/3 after having a fall.  She tripped and fell at her nursing home.  Patient was noted to have a left femoral neck fracture.  Orthopedics performed a left hip hemiarthroplasty on .  Patient has been noted to have worsening oxygenation.  Per patient, she is a never smoker.  She has never required oxygen.  She does not feel sick.  She  denies cough or dyspnea.    History:  Past Medical History:   Diagnosis Date    Contusion of scalp 5/10/2022    COVID-19 virus infection 1/11/2023    Head injury 5/10/2022    S/P hysterectomy late 80's     Past Surgical History:   Procedure Laterality Date    HYSTERECTOMY      SKIN SURGERY Left 04/27/2016    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    SKIN SURGERY Left 04/27/2016    ED&C of SCCIS to L Upper Arm by SD    SKIN SURGERY Left 04/27/2016    ED&C of SCCIS to L Upper Back by SD     Family History   Problem Relation Age of Onset    Other (Other) Father         Aortic Aneurysm    Hypertension Brother       reports that she has never smoked. She has never used smokeless tobacco. She reports current alcohol use of about 1.0 standard drink of alcohol per week. She reports that she does not use drugs.    Allergies:  No Known Allergies    Medications:    Current Facility-Administered Medications:     acetaminophen-codeine (Tylenol #3) 300-30 MG per tab 1 tablet, 1 tablet, Oral, Q4H PRN    sennosides (Senokot) tab 17.2 mg, 17.2 mg, Oral, Nightly    docusate sodium (Colace) cap 100 mg, 100 mg, Oral, BID    polyethylene glycol (PEG 3350) (Miralax) 17 g oral packet 17 g, 17 g, Oral, Daily PRN    magnesium hydroxide (Milk of Magnesia) 400 MG/5ML oral suspension 30 mL, 30 mL, Oral, Daily PRN    bisacodyl (Dulcolax) 10 MG rectal suppository 10 mg, 10 mg, Rectal, Daily PRN    fleet enema (Fleet) 7-19 GM/118ML rectal enema 133 mL, 1 enema, Rectal, Once PRN    ondansetron (Zofran) 4 MG/2ML injection 4 mg, 4 mg, Intravenous, Q6H PRN    metoclopramide (Reglan) 5 mg/mL injection 10 mg, 10 mg, Intravenous, Q8H PRN    multivitamin (Tab-A-Carole/Beta Carotene) tab 1 tablet, 1 tablet, Oral, Daily    aspirin DR tab 81 mg, 81 mg, Oral, BID    lidocaine PF (Xylocaine-MPF) 1% injection, 2 mL, Injection, PRN    ropivacaine (Naropin) 0.5% injection, 30 mL, Injection, PRN    EPINEPHrine (Adrenalin) 1 MG/ML injection (Allergic Reaction Kit) 1  mg, 1 mg, Injection, PRN    sodium chloride 0.9% PF injection 10 mL, 10 mL, Injection, PRN    acetaminophen (Tylenol Extra Strength) tab 500 mg, 500 mg, Oral, Q4H PRN    melatonin tab 3 mg, 3 mg, Oral, Nightly PRN    dextrose 5%-sodium chloride 0.45% infusion, , Intravenous, Continuous    heparin (Porcine) 5000 UNIT/ML injection 5,000 Units, 5,000 Units, Subcutaneous, 2 times per day    sennosides (Senokot) tab 17.2 mg, 17.2 mg, Oral, Nightly PRN    fleet enema (Fleet) 7-19 GM/118ML rectal enema 133 mL, 1 enema, Rectal, Once PRN    donepezil (Aricept) tab 10 mg, 10 mg, Oral, Daily    FLUoxetine (PROzac) tab 10 mg, 10 mg, Oral, Daily    levothyroxine (Synthroid) tab 75 mcg, 75 mcg, Oral, Before breakfast    lisinopril (Prinivil; Zestril) tab 20 mg, 20 mg, Oral, Daily    QUEtiapine (SEROquel) tab 25 mg, 25 mg, Oral, Nightly    atorvastatin (Lipitor) tab 10 mg, 10 mg, Oral, Nightly    traZODone (Desyrel) tab 50 mg, 50 mg, Oral, Nightly    Review of Systems:   All other review of systems are negative.    Vital signs in last 24 hours:  Patient Vitals for the past 24 hrs:   BP Temp Temp src Pulse Resp SpO2   02/07/24 1115 100/79 97.7 °F (36.5 °C) Oral 53 16 (!) 89 %   02/07/24 0830 125/85 98 °F (36.7 °C) Oral 88 18 92 %   02/07/24 0400 123/67 98.2 °F (36.8 °C) Oral 83 16 (!) 83 %   02/07/24 0000 125/79 98.1 °F (36.7 °C) Oral 104 16 --   02/06/24 2000 132/71 98.1 °F (36.7 °C) Oral 86 16 93 %   02/06/24 1700 119/87 98.4 °F (36.9 °C) Oral 71 14 92 %       Intake/Output:    Intake/Output Summary (Last 24 hours) at 2/7/2024 1629  Last data filed at 2/7/2024 1115  Gross per 24 hour   Intake 340 ml   Output 350 ml   Net -10 ml       Physical Exam:   General: alert, cooperative, oriented.  No respiratory distress.   Head: Normocephalic, without obvious abnormality, atraumatic.   Eyes: Conjunctivae/corneas clear.  No scleral icterus.  No conjunctival     hemorrhage.   Nose: Nares normal.   Throat: Lips, mucosa, and tongue normal.   No thrush noted.   Neck: Soft, supple neck; trachea midline, no adenopathy, no thyromegaly.   Lungs: diminished breath sounds bilaterally   Chest wall: No tenderness or deformity.   Heart: Regular rate and rhythm, normal S1S2, no murmur.   Abdomen: soft, non-tender, non-distended, no masses, no guarding, no     rebound, positive BS.   Extremity: no edema, no cyanosis   Skin: No rashes or lesions.   Neurological: Alert, interactive, no focal deficits    Lab Data Review:  Recent Labs   Lab 02/03/24  1648 02/04/24  0628 02/05/24  0524   WBC 14.9* 10.3  --    HGB 14.9 13.6 12.1   HCT 45.1 42.5 36.3   .0 175.0  --        Recent Labs   Lab 02/03/24  1648 02/04/24  0628    142   K 3.8 3.9    111   CO2 26.0 28.0   BUN 15 14   ALT 30  --    AST 20  --        Recent Labs   Lab 02/04/24  0628   MG 2.0       Lab Results   Component Value Date    PHOS 3.1 01/09/2024        Recent Labs   Lab 02/03/24  1648   INR 1.00       No results for input(s): \"ABGPHT\", \"WQSZIO2X\", \"HGJJD3W\", \"ABGHCO3\", \"ABGBE\", \"TEMP\", \"YAQUELIN\", \"SITE\", \"DEV\", \"THGB\" in the last 168 hours.    Invalid input(s): \"ZIM01PKP\", \"CHOB\"    Invalid input(s): \"CKTOTAL\", \"TROPONINI\", \"TROPONINT\", \"CKMBINDEX\"      Cultures:   No results found for this visit on 02/03/24.    Radiology:  XR CHEST AP PORTABLE  (CPT=71045)  Narrative: PROCEDURE:  XR CHEST AP PORTABLE  (CPT=71045)     TECHNIQUE:  AP chest radiograph was obtained.     COMPARISON:  EDHERNANDO , XR, XR CHEST AP PORTABLE  (CPT=71045), 2/03/2024, 6:02 PM.     INDICATIONS:  hypoxia     PATIENT STATED HISTORY: (As transcribed by Technologist)           FINDINGS:  Mild atelectasis/scarring in the lower lungs.  Stable tortuosity of the thoracic aorta.  Stable mild cardiomegaly with normal pulmonary vascularity.  No pleural effusion or pneumothorax.  Degenerative changes the spine.                   Impression: CONCLUSION:  Mild atelectasis/scarring in the lower lungs.  No lobar pneumonia or overt  congestive failure.        LOCATION:  [facility]                 Dictated by (CST): Galileo Medina MD on 2/06/2024 at 11:38 AM       Finalized by (CST): Galileo Medina MD on 2/06/2024 at 11:39 AM         Assessment:  Acute hypoxemic respiratory failure etiology unclear, favoring pulmonary embolism, atelectasis versus possible pneumonia, currently on 5 L, usually on room air at baseline  Left femoral neck fracture status post left hip hemiarthroplasty  Hypertension  Dementia, appears to be at baseline      Plan:  Agree with checking CT angio of chest to evaluate for pulmonary embolism  If pulmonary embolism is found, I would let orthopedics know and start therapeutic anticoagulation if okay with them  If not okay, then would need IVC filter  I do not see any indications for nebulizer/breathing treatments or antibiotics at this time  More recommendations to be made after CT scan    Thank you for the consultation.  Will follow with you.    Boris Patel MD  [facility]  Office: [phone]      Electronically signed by oBris Patel MD on 2/7/2024  4:34 PM           D/C Summary    No notes of this type exist for this encounter.        Physical Therapy Notes (last 72 hours)      Physical Therapy Note signed by Esther Meyer PTA at 2/9/2024  9:01 AM  Version 2 of 2    Author: Esther Meyer PTA Service: Rehab Author Type: Physical Therapy Assistant    Filed: 2/9/2024  9:01 AM Date of Service: 2/9/2024  8:57 AM Status: Addendum    : Esther Meyer PTA (Physical Therapy Assistant)    Related Notes: Original Note by Esther Meyer PTA (Physical Therapy Assistant) filed at 2/9/2024  9:00 AM          PHYSICAL THERAPY TREATMENT NOTE - INPATIENT    Room Number: 373/373-A     Session: 2    Number of Visits to Meet Established Goals: 5    Presenting Problem: fall with left femoral neck fracture now s/p left hip hemiarthroplasty  Co-Morbidities : HTN, severe dementia,  macular degeneration    ASSESSMENT   Patient demonstrates fair progress this session, goals  remain in progress.    Pt remains up to chair with use of Lacey sit<>stand lift, and two person assist.  Pt with positive PE per EMR - already started on Eliquis 2/8 per RN, and cleared for OOB mobility.     Patient continues to function below baseline with bed mobility and transfers.  Contributing factors to remaining limitations include decreased functional strength, impaired motor planning, difficulty maintaining precautions, and cognitive deficits (poor carryover, alert to self only - unable to recall reasoning for admission or hip precautions).  Next session anticipate patient to progress bed mobility.  Physical Therapy will continue to follow patient for duration of hospitalization.    Patient continues to benefit from continued skilled PT services: to promote return to prior level of function and safety with continuous assistance and gradual rehabilitative therapy .    PLAN  PT Treatment Plan: Bed mobility;Body mechanics;Coordination;Endurance;Patient education;Energy conservation;Family education;Gait training;Neuromuscular re-educate;Range of motion;Strengthening;Stoop training;Stair training;Transfer training;Balance training  Rehab Potential : Fair  Frequency (Obs): 3-5x/week    CURRENT GOALS     Goal #1 Patient is able to demonstrate supine - sit EOB @ level: supervision      Goal #2 Patient is able to demonstrate transfers Sit to/from Stand at assistance level: supervision      Goal #3 Patient is able to ambulate 50 feet with assist device: walker - rolling at assistance level: supervision      Goal #4     Goal #5     Goal #6     Goal Comments: Goals established on 2/5/2024 2/9/2024 all goals ongoing     SUBJECTIVE  \"Dont you touch my leg\" - while laughing    OBJECTIVE  Precautions: NOLA - posterior;Hip abduction pillow;Bed/chair alarm (getting jose brace)    WEIGHT BEARING RESTRICTION  Weight Bearing  Restriction: L lower extremity           L Lower Extremity: Weight Bearing as Tolerated    PAIN ASSESSMENT   Ratin  Location: left hip  Management Techniques: Activity promotion;Body mechanics;Repositioning    BALANCE                                                                                                                       Static Sitting: Fair  Dynamic Sitting: Poor +           Static Standing: Dependent  Dynamic Standing: Dependent    ACTIVITY TOLERANCE                         O2 WALK         AM-PAC '6-Clicks' INPATIENT SHORT FORM - BASIC MOBILITY  How much difficulty does the patient currently have...  Patient Difficulty: Turning over in bed (including adjusting bedclothes, sheets and blankets)?: A Lot   Patient Difficulty: Sitting down on and standing up from a chair with arms (e.g., wheelchair, bedside commode, etc.): Unable   Patient Difficulty: Moving from lying on back to sitting on the side of the bed?: Unable   How much help from another person does the patient currently need...   Help from Another: Moving to and from a bed to a chair (including a wheelchair)?: Total   Help from Another: Need to walk in hospital room?: Total   Help from Another: Climbing 3-5 steps with a railing?: Total       AM-PAC Score:  Raw Score: 7   Approx Degree of Impairment: 92.36%   Standardized Score (AM-PAC Scale): 26.42   CMS Modifier (G-Code): CM    FUNCTIONAL ABILITY STATUS  Gait Assessment   Functional Mobility/Gait Assessment  Gait Assistance: Not tested  Distance (ft): 0    Skilled Therapy Provided    Bed Mobility:  Rolling: Max A (repositioning)  Supine<>Sit: Max A x 2   Sit<>Supine: NT       Therapist's Comments: Pt placed into chair position - participated in BLE therex.  Pt up to chair via lift - left with breakfast tray, and DIL to assist.           THERAPEUTIC EXERCISES  Lower Extremity Ankle pumps  Heel slides  LAQ  Quad sets  SAQ  SLR  LLE only - ankle pumps, heel slides (active assist)     Upper  Extremity none     Position Supine     Repetitions   10   Sets   1     Patient End of Session: Up in chair;Call light within reach;RN aware of session/findings;Needs met;Bracing education provided per handout;All patient questions and concerns addressed;Alarm set;Family present    PT Session Time: 25 minutes  Gait Trainin minutes  Therapeutic Activity: 15 minutes  Therapeutic Exercise: 8 minutes   Neuromuscular Re-education: 0 minutes                 Physical Therapy Note signed by Esther Meyer PTA at 2024  9:00 AM  Version 1 of 2    Author: Esther Meyer PTA Service: Rehab Author Type: Physical Therapy Assistant    Filed: 2024  9:00 AM Date of Service: 2024  8:57 AM Status: Signed    : Esther Meyer PTA (Physical Therapy Assistant)    Related Notes: Addendum by Esther Meyer PTA (Physical Therapy Assistant) filed at 2024  9:01 AM          PHYSICAL THERAPY TREATMENT NOTE - INPATIENT    Room Number: 373/373-A     Session: 2    Number of Visits to Meet Established Goals: 5    Presenting Problem: fall with left femoral neck fracture now s/p left hip hemiarthroplasty  Co-Morbidities : HTN, severe dementia, macular degeneration    ASSESSMENT   Patient demonstrates fair progress this session, goals  remain in progress.    Pt remains up to chair with use of Lacey sit<>stand lift, and two person assist.  Pt with positive PE per EMR - already started on Eliquis  per RN, and cleared for OOB mobility.     Patient continues to function below baseline with bed mobility and transfers.  Contributing factors to remaining limitations include decreased functional strength, impaired motor planning, difficulty maintaining precautions, and cognitive deficits (poor carryover, alert to self only - unable to recall reasoning for admission or hip precautions).  Next session anticipate patient to progress bed mobility.  Physical Therapy will continue to follow patient for duration of  hospitalization.    Patient continues to benefit from continued skilled PT services: to promote return to prior level of function and safety with continuous assistance and gradual rehabilitative therapy .    PLAN  PT Treatment Plan: Bed mobility;Body mechanics;Coordination;Endurance;Patient education;Energy conservation;Family education;Gait training;Neuromuscular re-educate;Range of motion;Strengthening;Stoop training;Stair training;Transfer training;Balance training  Rehab Potential : Fair  Frequency (Obs): 3-5x/week    CURRENT GOALS     Goal #1 Patient is able to demonstrate supine - sit EOB @ level: supervision      Goal #2 Patient is able to demonstrate transfers Sit to/from Stand at assistance level: supervision      Goal #3 Patient is able to ambulate 50 feet with assist device: walker - rolling at assistance level: supervision      Goal #4     Goal #5     Goal #6     Goal Comments: Goals established on 2/5/2024 2/9/2024 all goals ongoing     SUBJECTIVE  \"Dont you touch my leg\" - while laughing    OBJECTIVE  Precautions: NOLA - posterior;Hip abduction pillow;Bed/chair alarm (getting jose brace)    WEIGHT BEARING RESTRICTION  Weight Bearing Restriction: L lower extremity           L Lower Extremity: Weight Bearing as Tolerated    PAIN ASSESSMENT   Rating:  (moderate)  Location: left hip  Management Techniques: Activity promotion;Body mechanics;Repositioning    BALANCE                                                                                                                       Static Sitting: Fair  Dynamic Sitting: Poor +           Static Standing: Dependent  Dynamic Standing: Dependent    ACTIVITY TOLERANCE                         O2 WALK         AM-PAC '6-Clicks' INPATIENT SHORT FORM - BASIC MOBILITY  How much difficulty does the patient currently have...  Patient Difficulty: Turning over in bed (including adjusting bedclothes, sheets and blankets)?: A Little   Patient Difficulty: Sitting down on  and standing up from a chair with arms (e.g., wheelchair, bedside commode, etc.): Unable   Patient Difficulty: Moving from lying on back to sitting on the side of the bed?: Unable   How much help from another person does the patient currently need...   Help from Another: Moving to and from a bed to a chair (including a wheelchair)?: Total   Help from Another: Need to walk in hospital room?: Total   Help from Another: Climbing 3-5 steps with a railing?: Total       AM-PAC Score:  Raw Score: 8   Approx Degree of Impairment: 86.62%   Standardized Score (AM-PAC Scale): 28.58   CMS Modifier (G-Code): CM    FUNCTIONAL ABILITY STATUS  Gait Assessment   Functional Mobility/Gait Assessment  Gait Assistance: Not tested  Distance (ft): 0    Skilled Therapy Provided    Bed Mobility:  Rolling: Max A (repositioning)  Supine<>Sit: Max A x 2   Sit<>Supine: NT       Therapist's Comments: Pt placed into chair position - participated in BLE therex.  Pt up to chair via lift - left with breakfast tray, and DIL to assist.           THERAPEUTIC EXERCISES  Lower Extremity Ankle pumps  Heel slides  LAQ  Quad sets  SAQ  SLR  LLE only - ankle pumps, heel slides (active assist)     Upper Extremity none     Position Supine     Repetitions   10   Sets   1     Patient End of Session: In bed;Needs met;Call light within reach;RN aware of session/findings;Bracing education provided per handout;All patient questions and concerns addressed;Alarm set;Family present    PT Session Time: 25 minutes  Gait Trainin minutes  Therapeutic Activity: 15 minutes  Therapeutic Exercise: 8 minutes   Neuromuscular Re-education: 0 minutes                 Physical Therapy Note signed by Esther Meyer PTA at 2024  3:25 PM  Version 1 of 1    Author: Esther Meyer PTA Service: Rehab Author Type: Physical Therapy Assistant    Filed: 2024  3:25 PM Date of Service: 2024  3:09 PM Status: Signed    : Esther Meyer PTA (Physical Therapy  Assistant)          PHYSICAL THERAPY TREATMENT NOTE - INPATIENT    Room Number: 373/373-A     Session: 1     Number of Visits to Meet Established Goals: 5    Presenting Problem: fall with left femoral neck fracture now s/p left hip hemiarthroplasty  Co-Morbidities : HTN, severe dementia, macular degeneration    ASSESSMENT   Patient demonstrates limited progress this session, goals  remain in progress.    Patient continues to function below baseline with bed mobility and transfers.  Contributing factors to remaining limitations include decreased functional strength, impaired motor planning, difficulty maintaining precautions, and cognitive deficits (poor carryover, alert to self only - unable to recall reasoning for admission or hip precautions).  Next session anticipate patient to progress bed mobility.  Physical Therapy will continue to follow patient for duration of hospitalization.    Patient continues to benefit from continued skilled PT services: to promote return to prior level of function and safety with continuous assistance and gradual rehabilitative therapy .    PLAN  PT Treatment Plan: Bed mobility;Body mechanics;Coordination;Endurance;Patient education;Energy conservation;Family education;Gait training;Neuromuscular re-educate;Range of motion;Strengthening;Stoop training;Stair training;Transfer training;Balance training  Rehab Potential : Fair  Frequency (Obs): 3-5x/week    CURRENT GOALS     Goal #1 Patient is able to demonstrate supine - sit EOB @ level: supervision      Goal #2 Patient is able to demonstrate transfers Sit to/from Stand at assistance level: supervision      Goal #3 Patient is able to ambulate 50 feet with assist device: walker - rolling at assistance level: supervision      Goal #4     Goal #5     Goal #6     Goal Comments: Goals established on 2/5/2024 2/7/2024 all goals ongoing     SUBJECTIVE  \"I am in moderate pain\"    OBJECTIVE  Precautions: NOLA - posterior;Hip abduction  pillow;Bed/chair alarm (getting jose brace)    WEIGHT BEARING RESTRICTION  Weight Bearing Restriction: L lower extremity           L Lower Extremity: Weight Bearing as Tolerated    PAIN ASSESSMENT   Rating:  (moderate)  Location: left hip  Management Techniques: Activity promotion;Body mechanics;Repositioning    BALANCE                                                                                                                       Static Sitting: Fair  Dynamic Sitting: Poor +           Static Standing: Dependent  Dynamic Standing: Dependent    ACTIVITY TOLERANCE                         O2 WALK         AM-PAC '6-Clicks' INPATIENT SHORT FORM - BASIC MOBILITY  How much difficulty does the patient currently have...  Patient Difficulty: Turning over in bed (including adjusting bedclothes, sheets and blankets)?: A Little   Patient Difficulty: Sitting down on and standing up from a chair with arms (e.g., wheelchair, bedside commode, etc.): Unable   Patient Difficulty: Moving from lying on back to sitting on the side of the bed?: Unable   How much help from another person does the patient currently need...   Help from Another: Moving to and from a bed to a chair (including a wheelchair)?: Total   Help from Another: Need to walk in hospital room?: Total   Help from Another: Climbing 3-5 steps with a railing?: Total       AM-PAC Score:  Raw Score: 8   Approx Degree of Impairment: 86.62%   Standardized Score (AM-PAC Scale): 28.58   CMS Modifier (G-Code): CM    FUNCTIONAL ABILITY STATUS  Gait Assessment   Functional Mobility/Gait Assessment  Gait Assistance: Not tested  Distance (ft): 0    Skilled Therapy Provided    Bed Mobility:  Rolling: Max A (repositioning)  Supine<>Sit: NT   Sit<>Supine: NT       Therapist's Comments: Pt placed into chair position - participated in BUE/BLE therex.  Pt's DIL present for education and encouragement.       THERAPEUTIC EXERCISES  Lower Extremity Ankle pumps  Heel slides  LAQ  Quad  sets  SAQ  SLR  LLE only - ankle pumps, heel slides (active assist)     Upper Extremity Elbow flex/ext,  - open/close, OH Reaching, and Shoulder flex/ext     Position Supine     Repetitions   10   Sets   1     Patient End of Session: In bed;Needs met;Call light within reach;RN aware of session/findings;Bracing education provided per handout;All patient questions and concerns addressed;Alarm set;Family present    PT Session Time: 25 minutes  Gait Trainin minutes  Therapeutic Activity: 15 minutes  Therapeutic Exercise: 8 minutes   Neuromuscular Re-education: 0 minutes                          Occupational Therapy Notes (last 72 hours)      Occupational Therapy Note signed by Bari Rubio OT at 2024 11:56 AM  Version 1 of 1    Author: Bari Rubio OT Service: Rehab Author Type: Occupational Therapist    Filed: 2024 11:56 AM Date of Service: 2024 11:30 AM Status: Signed    : Bari Rubio OT (Occupational Therapist)       OCCUPATIONAL THERAPY TREATMENT NOTE - INPATIENT     Room Number: 373/373-A  Session: 1   Number of Visits to Meet Established Goals: 5    Presenting Problem: L hip fx, s/p L hip bipolar hemiarthroplasty 24      ASSESSMENT   Patient demonstrates limited progress this session, goals remain in progress.    Pt with positive PE per EMR - already started on Eliquis  per RN, and cleared for OOB mobility.     Patient continues to function below baseline with toileting, lower body dressing, bed mobility, and transfers.   Contributing factors to remaining limitations include decreased functional strength, pain, impaired motor planning, difficulty maintaining precautions, cognitive deficits (poor carryover to knowledge of hip precautions, alert to self), decreased insight to deficits, and decreased safety awareness.  Next session anticipate patient to progress bed mobility and transfers.  Occupational Therapy will continue to follow patient for duration of  hospitalization.    Patient continues to benefit from continued skilled OT services: to promote return to prior level of function and safety with continuous assistance and gradual rehabilitative therapy .       OT Discharge Recommendations (Read-only): Sub-acute rehabilitation    History: Patient is a 83 year old female admitted on 2/3/2024 with Presenting Problem: L hip fx, s/p L hip bipolar hemiarthroplasty 2/4/24. Brain CT, L knee xray negative. Chest xray shows L atelectasis vs PNA. Co-Morbidities : HTN, severe dementia, macular degeneration    WEIGHT BEARING RESTRICTION  Weight Bearing Restriction: L lower extremity           L Lower Extremity: Weight Bearing as Tolerated      TREATMENT SESSION:  Patient Start of Session: seated in chair; family present  FUNCTIONAL TRANSFER ASSESSMENT  Sit to Stand: Edge of Bed  Edge of Bed: Not Tested    BED MOBILITY  Supine to Sit : Not tested  Sit to Supine (OT): Not Tested    BALANCE ASSESSMENT  Static Sitting: Not Tested    FUNCTIONAL ADL ASSESSMENT  LB Dressing Seated: Not Tested      ACTIVITY TOLERANCE: poor/fair                         O2 SATURATIONS       EDUCATION PROVIDED  Patient: Role of Occupational Therapy; Plan of Care; Discharge Recommendations; Weight Bear Status; Surgical Precautions  Patient's Response to Education: Demonstrates Poor Carry Over to Information; Does Not Demonstrate Skills Needed for Learning  Family/Caregiver: Role of Occupational Therapy; Plan of Care; Functional Transfer Techniques; Fall Prevention; Weight Bear Status; Surgical Precautions  Family/Caregiver's Response to Education: Verbalized Understanding; Requires Further Education (daughter-in-law present)    THERAPEUTIC EXERCISES  Lower Extremity      Upper Extremity Shoulder raises  Forward punches  Elbow flexion/extension  Hand pumps    Forward seated crunches while adhering to hip precautions     Position Sitting in chair/semi reclined     Repetitions 10   Sets 2       Therapist  comments: Pt provided with UE exercises and core strengthening exercises (while adhering to hip precautions), to increase strength, ROM, and activity tolerance required for increased ADL performance, functional transfers, and UE function.   Patient End of Session: Up in chair;Needs met;Call light within reach;RN aware of session/findings;All patient questions and concerns addressed;Alarm set;Family present    SUBJECTIVE  \"Don't touch me.\"    PAIN ASSESSMENT  Rating: Unable to rate  Location: L hip  Management Techniques: Activity promotion;Body mechanics;Breathing techniques;Relaxation;Repositioning;Nurse notified     OBJECTIVE  Precautions: NOLA - posterior;Hip abduction pillow;Bed/chair alarm (getting jose brace)    AM-PAC ‘6-Clicks’ Inpatient Daily Activity Short Form  -   Putting on and taking off regular lower body clothing?: Total  -   Bathing (including washing, rinsing, drying)?: A Lot  -   Toileting, which includes using toilet, bedpan or urinal? : Total  -   Putting on and taking off regular upper body clothing?: Total  -   Taking care of personal grooming such as brushing teeth?: A Lot  -   Eating meals?: A Little    AM-PAC Score:  Score: 10  Approx Degree of Impairment: 74.7%  Standardized Score (AM-PAC Scale): 27.31    PLAN  OT Treatment Plan: Balance activities;ADL training;Functional transfer training;Endurance training;Patient/Family education;Patient/Family training;Compensatory technique education  Rehab Potential : Fair  Frequency: 3x/week    OT Goals:     All goals ongoing 02/09    ADL GOALS   Patient will perform Toileting with mod assist     FUNCTIONAL TRANSFER GOALS   Patient will transfer Supine to Sit with mod assist  Patient will transfer Sit to Supine with mod assist  Patient will transfer to Commode with mod assist     ADDITIONAL GOALS   Patient will follow cues for hip precautions with mod cueing    OT Session Time: 25 minutes  Therapeutic Exercise: 25 minutes           Occupational  Therapy Note signed by Bari Rubio OT at 2/8/2024 11:20 AM  Version 1 of 1    Author: Brai Rubio OT Service: Rehab Author Type: Occupational Therapist    Filed: 2/8/2024 11:20 AM Date of Service: 2/8/2024 11:20 AM Status: Signed    : Bari Rubio OT (Occupational Therapist)       Attempted to see pt for skilled OT services this date. Pt EZEKIEL for US. RN made aware of attempt. Will follow-up later today as schedule permits.               Video Swallow Study Notes    No notes of this type exist for this encounter.     SLP Notes    No notes of this type exist for this encounter.     Immunizations     Name Date      Covid-19 Pfizer 01/27/21     Covid-19 Pfizer 01/06/21     Fluad 0.5ml 10/10/18     Fluad 0.5ml 09/27/17     INFLUENZA 10/20/20     INFLUENZA 10/22/19     INFLUENZA 10/22/19     INFLUENZA 10/06/16     INFLUENZA 10/06/16     Pneumovax 23 07/18/19       Future Appointments        Provider Department Center    5/6/2024 8:45 AM Boris Patel MD [facility]      Multidisciplinary Problems     Active Goals        Problem: Patient/Family Goals    Goal Priority Disciplines Outcome Interventions   Patient/Family Long Term Goal     Interdisciplinary Progressing    Description: Patient's Long Term Goal: ***    Interventions:  - ***  - See additional Care Plan goals for specific interventions   Patient/Family Short Term Goal     Interdisciplinary Progressing    Description: Patient's Short Term Goal: ***    Interventions:   - ***  - See additional Care Plan goals for specific interventions               Discharge Treatment Preferences    Flowsheet Row Most Recent Value   Preferences    PASRR Level 1 Submitted Yes

## (undated) NOTE — LETTER
[address]  [address]  Authorization for Surgical Operation and Procedure     Date:___________                                                                                                         Time:__________  I hereby authorize Surgeon(s):  Galo Heck MD, my physician and his/her assistants (if applicable), which may include medical students, residents, and/or fellows, to perform the following surgical operation/ procedure and administer such anesthesia as may be determined necessary by my physician:  Operation/Procedure name (s) Procedure(s):  LEFT HIP HEMIARTHROPLASTY/ BIPOLARL POSS. TOTAL on Mar F Creaves   2.   I recognize that during the surgical operation/procedure, unforeseen conditions may necessitate additional or different procedures than those listed above.  I, therefore, further authorize and request that the above-named surgeon, assistants, or designees perform such procedures as are, in their judgment, necessary and desirable.    3.   My surgeon/physician has discussed prior to my surgery the potential benefits, risks and side effects of this procedure; the likelihood of achieving goals; and potential problems that might occur during recuperation.  They also discussed reasonable alternatives to the procedure, including risks, benefits, and side effects related to the alternatives and risks related to not receiving this procedure.  I have had all my questions answered and I acknowledge that no guarantee has been made as to the result that may be obtained.    4.   Should the need arise during my operation/procedure, which includes change of level of care prior to discharge, I also consent to the administration of blood and/or blood products.  Further, I understand that despite careful testing and screening of blood or blood products by collecting agencies, I may still be subject to ill effects as a result of receiving a blood transfusion and/or blood  products.  The following are some, but not all, of the potential risks that can occur: fever and allergic reactions, hemolytic reactions, transmission of diseases such as Hepatitis, AIDS and Cytomegalovirus (CMV) and fluid overload.  In the event that I wish to have an autologous transfusion of my own blood, or a directed donor transfusion, I will discuss this with my physician.  Check only if Refusing Blood or Blood Products  I understand refusal of blood or blood products as deemed necessary by my physician may have serious consequences to my condition to include possible death. I hereby assume responsibility for my refusal and release the hospital, its personnel, and my physicians from any responsibility for the consequences of my refusal.          o  Refuse      5.   I authorize the use of any specimen, organs, tissues, body parts or foreign objects that may be removed from my body during the operation/procedure for diagnosis, research or teaching purposes and their subsequent disposal by hospital authorities.  I also authorize the release of specimen test results and/or written reports to my treating physician on the hospital medical staff or other referring or consulting physicians involved in my care, at the discretion of the Pathologist or my treating physician.    6.   I consent to the photographing or videotaping of the operations or procedures to be performed, including appropriate portions of my body for medical, scientific, or educational purposes, provided my identity is not revealed by the pictures or by descriptive texts accompanying them.  If the procedure has been photographed/videotaped, the surgeon will obtain the original picture, image, videotape or CD.  The hospital will not be responsible for storage, release or maintenance of the picture, image, tape or CD.    7.   I consent to the presence of a  or observers in the operating room as deemed necessary by my physician or  their designees.    8.   I recognize that in the event my procedure results in extended X-Ray/fluoroscopy time, I may develop a skin reaction.    9. If I have a Do Not Attempt Resuscitation (DNAR) order in place, that status will be suspended while in the operating room, procedural suite, and during the recovery period unless otherwise explicitly stated by me (or a person authorized to consent on my behalf). The surgeon or my attending physician will determine when the applicable recovery period ends for purposes of reinstating the DNAR order.  10. Patients having a sterilization procedure: I understand that if the procedure is successful the results will be permanent and it will therefore be impossible for me to inseminate, conceive, or bear children.  I also understand that the procedure is intended to result in sterility, although the result has not been guaranteed.   11. I acknowledge that my physician has explained sedation/analgesia administration to me including the risk and benefits I consent to the administration of sedation/analgesia as may be necessary or desirable in the judgment of my physician.    I CERTIFY THAT I HAVE READ AND FULLY UNDERSTAND THE ABOVE CONSENT TO OPERATION and/or OTHER PROCEDURE.    _________________________________________  __________________________________  Signature of Patient     Signature of Responsible Person         ___________________________________         Printed Name of Responsible Person           _________________________________                 Relationship to Patient  _________________________________________  ______________________________  Signature of Witness          Date  Time      Patient Name: Mar Hernandez     : 1940                 Printed: 2024     Medical Record #: [id number]                     Page 1 of [address] [place] [address]  [address]    Consent for Anesthesia    I,  Mar Hernandez agree to be cared for by an anesthesiologist, who is specially trained to monitor me and give me medicine to put me to sleep or keep me comfortable during my procedure    I understand that my anesthesiologist is not an employee or agent of [facility] [employer]. He or she works for [employer].    As the patient asking for anesthesia services, I agree to:  Allow the anesthesiologist (anesthesia doctor) to give me medicine and do additional procedures as necessary. Some examples are: Starting or using an “IV” to give me medicine, fluids or blood during my procedure, and having a breathing tube placed to help me breathe when I’m asleep (intubation). In the event that my heart stops working properly, I understand that my anesthesiologist will make every effort to sustain my life, unless otherwise directed by [facility] Do Not Resuscitate documents.  Tell my anesthesia doctor before my procedure:  If I am pregnant.  The last time that I ate or drank.  All of the medicines I take (including prescriptions, herbal supplements, and pills I can buy without a prescription (including street drugs/illegal medications). Failure to inform my anesthesiologist about these medicines may increase my risk of anesthetic complications.  If I am allergic to anything or have had a reaction to anesthesia before.  I understand how the anesthesia medicine will help me (benefits).  I understand that with any type of anesthesia medicine there are risks:  The most common risks are: nausea, vomiting, sore throat, muscle soreness, damage to my eyes, mouth, or teeth (from breathing tube placement).  Rare risks include: remembering what happened during my procedure, allergic reactions to medications, injury to my airway, heart, lungs, vision, nerves, or muscles and in extremely rare instances death.  My doctor has explained to me other choices available to me for my care  (alternatives).  Pregnant Patients (“epidural”):  I understand that the risks of having an epidural (medicine given into my back to help control pain during labor), include itching, low blood pressure, difficulty urinating, headache or slowing of the baby’s heart. Very rare risks include infection, bleeding, seizure, irregular heart rhythms and nerve injury.  Regional Anesthesia (“spinal”, “epidural”, & “nerve blocks”):  I understand that rare but potential complications include headache, bleeding, infection, seizure, irregular heart rhythms, and nerve injury.    I can change my mind about having anesthesia services at any time before I get the medicine.    _____________________________________________________________________________  Patient (or Representative) Signature/Relationship to Patient  Date   Time    _____________________________________________________________________________   Name (if used)    Language/Organization   Time    _____________________________________________________________________________  Anesthesiologist Signature     Date   Time  I have discussed the procedure and information above with the patient (or patient’s representative) and answered their questions. The patient or their representative has agreed to have anesthesia services.    _____________________________________________________________________________  Witness        Date   Time  I have verified that the signature is that of the patient or patient’s representative, and that it was signed before the procedure  Patient Name: Mar Hernandez     : 1940                 Printed: 2024     Medical Record #: [id number]                     Page 2 of 2

## (undated) NOTE — LETTER
Surgery Scheduling Form     GE# (Required): [id number] Length of Case:       SurgeryDate: Rescheduled from:       Surgeon: Surgeon #2 (if dual case):       Anethesia Type: {ANESTHESIA TYPE:4720} Sex:        Name: Mar Hernandez  : 1940       If minor, name of parent/guardian to contact: Location: {PODIATRY SURGERY FORM:2284}       (Please [place] best ph. number to reach pt.) ??how to list          DIAGNOSIS        ICD 9 CODE/S (Required)      ***   ***     PROCEDURE  (Please specify site and/or laterality if applicable)      CPT CODES/S (Required)  *** {PODIATRY CPT FOR SURGERY FORM:4747}     Allergies: No Known Allergies     Additional Requests: (please highlight): C-arm    Fluoroscan     Implants/Hardware:  {PODIATRY SURGERY IMPLANTS:4998}    Special Request/any additional information regarding this pt./surgery:    Primary Care Physician/Phone Number: PRE-OP Testing Required?   Please Indicate: BMI  When:    Where:                  EKG?                     IV antibiotic ordered? Does Patient have an ICD (please indicate company):                  History of MRSA?                  Latex allergy? Pre-Op Meds: {PODIATRY SURGERY PREOP MEDS:4999}                      Needed?  Language spoken:                     Form Completed by:   Please fax this form to:      Contact Phone Number:  [phone]       Contact Fax Number:    Confidentiality Disclaimer: important-This fax is intended only for the individual or entity to which it is addressed and may contain information that is privileged, confidential and exempt from disclosure under applicable law.  If the reader is not the intended recipient or the employee or agent responsible for delivering the message to the intended recipient, you are hereby informed that any use, disclosure, distribution or copying of the communication is strictly prohibited.  If you have received this communication in error, please notify us  immediately by telephone and return the original message to [facility], [address], [place] [address] via the [place] Postal Service.  Thank you.

## (undated) NOTE — LETTER
[address]  [address]  Authorization for Surgical Operation and Procedure     Date:___________                                                                                                         Time:__________  I hereby authorize Surgeon(s):  Sumaya Garcia MD, my physician and his/her assistants (if applicable), which may include medical students, residents, and/or fellows, to perform the following surgical operation/ procedure and administer such anesthesia as may be determined necessary by my physician:  Operation/Procedure name (s) Procedure(s):  LAPAROSCOPIC CHOLECYSTECTOMY WITH INTRAOPERATIVE CHOLANGIOGRAM on Mar RUSSELL David   2.   I recognize that during the surgical operation/procedure, unforeseen conditions may necessitate additional or different procedures than those listed above.  I, therefore, further authorize and request that the above-named surgeon, assistants, or designees perform such procedures as are, in their judgment, necessary and desirable.    3.   My surgeon/physician has discussed prior to my surgery the potential benefits, risks and side effects of this procedure; the likelihood of achieving goals; and potential problems that might occur during recuperation.  They also discussed reasonable alternatives to the procedure, including risks, benefits, and side effects related to the alternatives and risks related to not receiving this procedure.  I have had all my questions answered and I acknowledge that no guarantee has been made as to the result that may be obtained.    4.   Should the need arise during my operation/procedure, which includes change of level of care prior to discharge, I also consent to the administration of blood and/or blood products.  Further, I understand that despite careful testing and screening of blood or blood products by collecting agencies, I may still be subject to ill effects as a result of receiving a blood  transfusion and/or blood products.  The following are some, but not all, of the potential risks that can occur: fever and allergic reactions, hemolytic reactions, transmission of diseases such as Hepatitis, AIDS and Cytomegalovirus (CMV) and fluid overload.  In the event that I wish to have an autologous transfusion of my own blood, or a directed donor transfusion, I will discuss this with my physician.  Check only if Refusing Blood or Blood Products  I understand refusal of blood or blood products as deemed necessary by my physician may have serious consequences to my condition to include possible death. I hereby assume responsibility for my refusal and release the hospital, its personnel, and my physicians from any responsibility for the consequences of my refusal.          o  Refuse      5.   I authorize the use of any specimen, organs, tissues, body parts or foreign objects that may be removed from my body during the operation/procedure for diagnosis, research or teaching purposes and their subsequent disposal by hospital authorities.  I also authorize the release of specimen test results and/or written reports to my treating physician on the hospital medical staff or other referring or consulting physicians involved in my care, at the discretion of the Pathologist or my treating physician.    6.   I consent to the photographing or videotaping of the operations or procedures to be performed, including appropriate portions of my body for medical, scientific, or educational purposes, provided my identity is not revealed by the pictures or by descriptive texts accompanying them.  If the procedure has been photographed/videotaped, the surgeon will obtain the original picture, image, videotape or CD.  The hospital will not be responsible for storage, release or maintenance of the picture, image, tape or CD.    7.   I consent to the presence of a  or observers in the operating room as deemed  necessary by my physician or their designees.    8.   I recognize that in the event my procedure results in extended X-Ray/fluoroscopy time, I may develop a skin reaction.    9. If I have a Do Not Attempt Resuscitation (DNAR) order in place, that status will be suspended while in the operating room, procedural suite, and during the recovery period unless otherwise explicitly stated by me (or a person authorized to consent on my behalf). The surgeon or my attending physician will determine when the applicable recovery period ends for purposes of reinstating the DNAR order.  10. Patients having a sterilization procedure: I understand that if the procedure is successful the results will be permanent and it will therefore be impossible for me to inseminate, conceive, or bear children.  I also understand that the procedure is intended to result in sterility, although the result has not been guaranteed.   11. I acknowledge that my physician has explained sedation/analgesia administration to me including the risk and benefits I consent to the administration of sedation/analgesia as may be necessary or desirable in the judgment of my physician.    I CERTIFY THAT I HAVE READ AND FULLY UNDERSTAND THE ABOVE CONSENT TO OPERATION and/or OTHER PROCEDURE.    _________________________________________  __________________________________  Signature of Patient     Signature of Responsible Person         ___________________________________         Printed Name of Responsible Person           _________________________________                 Relationship to Patient  _________________________________________  ______________________________  Signature of Witness          Date  Time      Patient Name: Mar Hernandez     : 1940                 Printed: November 15, 2024     Medical Record #: [id number]                     Page 1 of 2                                    [address]   [address]    Consent for Anesthesia    IMar agree to be cared for by an anesthesiologist, who is specially trained to monitor me and give me medicine to put me to sleep or keep me comfortable during my procedure    I understand that my anesthesiologist is not an employee or agent of [facility] or [employer]. He or she works for [employer].    As the patient asking for anesthesia services, I agree to:  Allow the anesthesiologist (anesthesia doctor) to give me medicine and do additional procedures as necessary. Some examples are: Starting or using an “IV” to give me medicine, fluids or blood during my procedure, and having a breathing tube placed to help me breathe when I’m asleep (intubation). In the event that my heart stops working properly, I understand that my anesthesiologist will make every effort to sustain my life, unless otherwise directed by [facility] Do Not Resuscitate documents.  Tell my anesthesia doctor before my procedure:  If I am pregnant.  The last time that I ate or drank.  All of the medicines I take (including prescriptions, herbal supplements, and pills I can buy without a prescription (including street drugs/illegal medications). Failure to inform my anesthesiologist about these medicines may increase my risk of anesthetic complications.  If I am allergic to anything or have had a reaction to anesthesia before.  I understand how the anesthesia medicine will help me (benefits).  I understand that with any type of anesthesia medicine there are risks:  The most common risks are: nausea, vomiting, sore throat, muscle soreness, damage to my eyes, mouth, or teeth (from breathing tube placement).  Rare risks include: remembering what happened during my procedure, allergic reactions to medications, injury to my airway, heart, lungs, vision, nerves, or muscles and in extremely rare instances death.  My doctor has explained to me other choices  available to me for my care (alternatives).  Pregnant Patients (“epidural”):  I understand that the risks of having an epidural (medicine given into my back to help control pain during labor), include itching, low blood pressure, difficulty urinating, headache or slowing of the baby’s heart. Very rare risks include infection, bleeding, seizure, irregular heart rhythms and nerve injury.  Regional Anesthesia (“spinal”, “epidural”, & “nerve blocks”):  I understand that rare but potential complications include headache, bleeding, infection, seizure, irregular heart rhythms, and nerve injury.    I can change my mind about having anesthesia services at any time before I get the medicine.    _____________________________________________________________________________  Patient (or Representative) Signature/Relationship to Patient  Date   Time    _____________________________________________________________________________   Name (if used)    Language/Organization   Time    _____________________________________________________________________________  Anesthesiologist Signature     Date   Time  I have discussed the procedure and information above with the patient (or patient’s representative) and answered their questions. The patient or their representative has agreed to have anesthesia services.    _____________________________________________________________________________  Witness        Date   Time  I have verified that the signature is that of the patient or patient’s representative, and that it was signed before the procedure  Patient Name: Mar Hernandez     : 1940                 Printed: November 15, 2024     Medical Record #: [id number]                     Page 2 of 2

## (undated) NOTE — LETTER
[address]  [address]  Authorization for Surgical Operation and Procedure     Date:___________                                                                                                         Time:__________  I hereby authorize Surgeon(s):  Galo Heck MD, my physician and his/her assistants (if applicable), which may include medical students, residents, and/or fellows, to perform the following surgical operation/ procedure and administer such anesthesia as may be determined necessary by my physician:  Operation/Procedure name (s) Procedure(s):  LEFT HIP HEMIARTHROPLASTY/ BIPOLARL POSS. TOTAL on Mar F Creaves   2.   I recognize that during the surgical operation/procedure, unforeseen conditions may necessitate additional or different procedures than those listed above.  I, therefore, further authorize and request that the above-named surgeon, assistants, or designees perform such procedures as are, in their judgment, necessary and desirable.    3.   My surgeon/physician has discussed prior to my surgery the potential benefits, risks and side effects of this procedure; the likelihood of achieving goals; and potential problems that might occur during recuperation.  They also discussed reasonable alternatives to the procedure, including risks, benefits, and side effects related to the alternatives and risks related to not receiving this procedure.  I have had all my questions answered and I acknowledge that no guarantee has been made as to the result that may be obtained.    4.   Should the need arise during my operation/procedure, which includes change of level of care prior to discharge, I also consent to the administration of blood and/or blood products.  Further, I understand that despite careful testing and screening of blood or blood products by collecting agencies, I may still be subject to ill effects as a result of receiving a blood transfusion and/or blood  products.  The following are some, but not all, of the potential risks that can occur: fever and allergic reactions, hemolytic reactions, transmission of diseases such as Hepatitis, AIDS and Cytomegalovirus (CMV) and fluid overload.  In the event that I wish to have an autologous transfusion of my own blood, or a directed donor transfusion, I will discuss this with my physician.  Check only if Refusing Blood or Blood Products  I understand refusal of blood or blood products as deemed necessary by my physician may have serious consequences to my condition to include possible death. I hereby assume responsibility for my refusal and release the hospital, its personnel, and my physicians from any responsibility for the consequences of my refusal.          o  Refuse      5.   I authorize the use of any specimen, organs, tissues, body parts or foreign objects that may be removed from my body during the operation/procedure for diagnosis, research or teaching purposes and their subsequent disposal by hospital authorities.  I also authorize the release of specimen test results and/or written reports to my treating physician on the hospital medical staff or other referring or consulting physicians involved in my care, at the discretion of the Pathologist or my treating physician.    6.   I consent to the photographing or videotaping of the operations or procedures to be performed, including appropriate portions of my body for medical, scientific, or educational purposes, provided my identity is not revealed by the pictures or by descriptive texts accompanying them.  If the procedure has been photographed/videotaped, the surgeon will obtain the original picture, image, videotape or CD.  The hospital will not be responsible for storage, release or maintenance of the picture, image, tape or CD.    7.   I consent to the presence of a  or observers in the operating room as deemed necessary by my physician or  their designees.    8.   I recognize that in the event my procedure results in extended X-Ray/fluoroscopy time, I may develop a skin reaction.    9. If I have a Do Not Attempt Resuscitation (DNAR) order in place, that status will be suspended while in the operating room, procedural suite, and during the recovery period unless otherwise explicitly stated by me (or a person authorized to consent on my behalf). The surgeon or my attending physician will determine when the applicable recovery period ends for purposes of reinstating the DNAR order.  10. Patients having a sterilization procedure: I understand that if the procedure is successful the results will be permanent and it will therefore be impossible for me to inseminate, conceive, or bear children.  I also understand that the procedure is intended to result in sterility, although the result has not been guaranteed.   11. I acknowledge that my physician has explained sedation/analgesia administration to me including the risk and benefits I consent to the administration of sedation/analgesia as may be necessary or desirable in the judgment of my physician.    I CERTIFY THAT I HAVE READ AND FULLY UNDERSTAND THE ABOVE CONSENT TO OPERATION and/or OTHER PROCEDURE.    _________________________________________  __________________________________  Signature of Patient     Signature of Responsible Person         ___________________________________         Printed Name of Responsible Person           _________________________________                 Relationship to Patient  _________________________________________  ______________________________  Signature of Witness          Date  Time      Patient Name: Mar Hernandez     : 1940                 Printed: 2024     Medical Record #: [id number]                     Page 1 of [address] [place] [address]  [id number]    Consent for Anesthesia    I,  Mar Hernandez agree to be cared for by an anesthesiologist, who is specially trained to monitor me and give me medicine to put me to sleep or keep me comfortable during my procedure    I understand that my anesthesiologist is not an employee or agent of [facility] [employer]. He or she works for [employer].    As the patient asking for anesthesia services, I agree to:  Allow the anesthesiologist (anesthesia doctor) to give me medicine and do additional procedures as necessary. Some examples are: Starting or using an “IV” to give me medicine, fluids or blood during my procedure, and having a breathing tube placed to help me breathe when I’m asleep (intubation). In the event that my heart stops working properly, I understand that my anesthesiologist will make every effort to sustain my life, unless otherwise directed by [facility] Do Not Resuscitate documents.  Tell my anesthesia doctor before my procedure:  If I am pregnant.  The last time that I ate or drank.  All of the medicines I take (including prescriptions, herbal supplements, and pills I can buy without a prescription (including street drugs/illegal medications). Failure to inform my anesthesiologist about these medicines may increase my risk of anesthetic complications.  If I am allergic to anything or have had a reaction to anesthesia before.  I understand how the anesthesia medicine will help me (benefits).  I understand that with any type of anesthesia medicine there are risks:  The most common risks are: nausea, vomiting, sore throat, muscle soreness, damage to my eyes, mouth, or teeth (from breathing tube placement).  Rare risks include: remembering what happened during my procedure, allergic reactions to medications, injury to my airway, heart, lungs, vision, nerves, or muscles and in extremely rare instances death.  My doctor has explained to me other choices available to me for my care  (alternatives).  Pregnant Patients (“epidural”):  I understand that the risks of having an epidural (medicine given into my back to help control pain during labor), include itching, low blood pressure, difficulty urinating, headache or slowing of the baby’s heart. Very rare risks include infection, bleeding, seizure, irregular heart rhythms and nerve injury.  Regional Anesthesia (“spinal”, “epidural”, & “nerve blocks”):  I understand that rare but potential complications include headache, bleeding, infection, seizure, irregular heart rhythms, and nerve injury.    I can change my mind about having anesthesia services at any time before I get the medicine.    _____________________________________________________________________________  Patient (or Representative) Signature/Relationship to Patient  Date   Time    _____________________________________________________________________________   Name (if used)    Language/Organization   Time    _____________________________________________________________________________  Anesthesiologist Signature     Date   Time  I have discussed the procedure and information above with the patient (or patient’s representative) and answered their questions. The patient or their representative has agreed to have anesthesia services.    _____________________________________________________________________________  Witness        Date   Time  I have verified that the signature is that of the patient or patient’s representative, and that it was signed before the procedure  Patient Name: Mar Hernandez     : 1940                 Printed: 2024     Medical Record #: [id number]                     Page 2 of 2

## (undated) NOTE — MR AVS SNAPSHOT
[facility]  [address] Ling Chavez [address] [place] [id number] [phone]               Thank you for choosing us for your health care visit with Renny Ramirez MD.  We are glad to serve you and happy to provide you with this Take by mouth. One po BID           Donepezil HCl 5 MG Tabs   Take 5 mg by mouth nightly.    Commonly known as:  ARICEPT           Levothyroxine Sodium 75 MCG Tabs   Take 1 tablet (75 mcg total) by mouth before breakfast.   Commonly known as:  SYNTHROID, LE Don’t eat while distracted and slow down. Avoid over sized portions. Don’t eat while when you’re bored.      EAT THESE FOODS MORE OFTEN: EAT THESE FOODS LESS OFTEN:   Make half your plate fruits and vegetables Highly refined, white starches including wh

## (undated) NOTE — LETTER
[address]  [address]  Authorization for Surgical Operation and Procedure   Date:___________                                                                                                         Time:__________  I hereby authorize HAI KING DO,  my physician and his/her assistants (if applicable), which may include medical students, residents, and/or fellows, to perform the following surgical operation/ procedure and administer such anesthesia as may be determined necessary by my physician:  Operation/Procedure name (s) ENDOSCOPIC RETROGRADE CHOLANGIOPANCREATOGRAPHY WITH POSSIBLE STENT PLACEMENT on Mar Hernandez   2.   I recognize that during the surgical operation/procedure, unforeseen conditions may necessitate additional or different procedures than those listed above.  I, therefore, further authorize and request that the above-named surgeon, assistants, or designees perform such procedures as are, in their judgment, necessary and desirable.    3.   My surgeon/physician has discussed prior to my surgery the potential benefits, risks and side effects of this procedure; the likelihood of achieving goals; and potential problems that might occur during recuperation.  They also discussed reasonable alternatives to the procedure, including risks, benefits, and side effects related to the alternatives and risks related to not receiving this procedure.  I have had all my questions answered and I acknowledge that no guarantee has been made as to the result that may be obtained.    4.   Should the need arise during my operation or immediate post-operative period, I also consent to the administration of blood and/or blood products.  Further, I understand that despite careful testing and screening of blood or blood products by collecting agencies, I may  still be subject to ill effects as a result of receiving a blood transfusion and/or blood products.  The following are some, but not all, of the potential risks that can occur: fever and allergic reactions, hemolytic reactions, transmission of diseases such as Hepatitis, AIDS and Cytomegalovirus (CMV) and fluid overload.  In the event that I wish to have an autologous transfusion of my own blood, or a directed donor transfusion.  I will discuss this with my physician.   5.   I authorize the use of any specimen, organs, tissues, body parts or foreign objects that may be removed from my body during the operation/procedure for diagnosis, research or teaching purposes and their subsequent disposal by hospital authorities.  I also authorize the release of specimen test results and/or written reports to my treating physician on the hospital medical staff or other referring or consulting physicians involved in my care, at the discretion of the Pathologist or my treating physician.    6.   I consent to the photographing or videotaping of the operations or procedures to be performed, including appropriate portions of my body for medical, scientific, or educational purposes, provided my identity is not revealed by the pictures or by descriptive texts accompanying them.  If the procedure has been photographed/videotaped, the surgeon will obtain the original picture, image, videotape or CD.  The hospital will not be responsible for storage, release or maintenance of the picture, image, tape or CD.    7.   I consent to the presence of a  or observers in the operating room as deemed necessary by my physician or their designees.    8.   I recognize that in the event my procedure results in extended X-Ray/fluoroscopy time, I may develop a skin reaction.    9. If I have a Do Not Attempt Resuscitation (DNAR) order in place, that status will be suspended while in the operating room, procedural suite, and during the  recovery period unless otherwise explicitly stated by me (or a person authorized to consent on my behalf). The surgeon or my attending physician will determine when the applicable recovery period ends for purposes of reinstating the DNAR order.  10. Patients having a sterilization procedure: I understand that if the procedure is successful the results will be permanent and it will therefore be impossible for me to inseminate, conceive, or bear children.  I also understand that the procedure is intended to result in sterility, although the result has not been guaranteed.   11. I acknowledge that my physician has explained sedation/analgesia administration to me including the risk and benefits I consent to the administration of sedation/analgesia as may be necessary or desirable in the judgment of my physician.    I CERTIFY THAT I HAVE READ AND FULLY UNDERSTAND THE ABOVE CONSENT TO OPERATION and/or OTHER PROCEDURE.      _________________________________________  __________________________________  Signature of Patient     Signature of Responsible Person         ___________________________________         Printed Name of Responsible Person           _________________________________                 Relationship to Patient  _________________________________________  ______________________________  Signature of Witness          Date  Time    Patient Name: Mar Hernandez     : 1940                 Printed: 2024     Medical Record #: [id number]                     Page 1 of 1

## (undated) NOTE — LETTER
November 15, 2017    Eldon Kirkland  [address] [place] [address] Chris Romero      Dear Mariela Fang: The following are the results of your recent tests. Please review the list of test results.   Your result is the value on the left; we have also supplied the